# Patient Record
Sex: MALE | Race: WHITE | NOT HISPANIC OR LATINO | Employment: FULL TIME | URBAN - METROPOLITAN AREA
[De-identification: names, ages, dates, MRNs, and addresses within clinical notes are randomized per-mention and may not be internally consistent; named-entity substitution may affect disease eponyms.]

---

## 2017-10-04 ENCOUNTER — HOSPITAL ENCOUNTER (EMERGENCY)
Facility: HOSPITAL | Age: 42
Discharge: HOME | End: 2017-10-04
Attending: EMERGENCY MEDICINE

## 2017-10-04 VITALS
OXYGEN SATURATION: 100 % | SYSTOLIC BLOOD PRESSURE: 150 MMHG | BODY MASS INDEX: 34.07 KG/M2 | DIASTOLIC BLOOD PRESSURE: 95 MMHG | WEIGHT: 230 LBS | HEIGHT: 69 IN | RESPIRATION RATE: 18 BRPM | HEART RATE: 59 BPM | TEMPERATURE: 98 F

## 2017-10-04 DIAGNOSIS — R07.9 CHEST PAIN, UNSPECIFIED TYPE: Primary | ICD-10-CM

## 2017-10-04 DIAGNOSIS — R07.9 CHEST PAIN: ICD-10-CM

## 2017-10-04 LAB
ALBUMIN SERPL BCP-MCNC: 3.8 G/DL
ALP SERPL-CCNC: 102 U/L
ALT SERPL W/O P-5'-P-CCNC: 16 U/L
ANION GAP SERPL CALC-SCNC: 8 MMOL/L
AST SERPL-CCNC: 18 U/L
BASOPHILS # BLD AUTO: 0.01 K/UL
BASOPHILS NFR BLD: 0.1 %
BILIRUB SERPL-MCNC: 0.2 MG/DL
BNP SERPL-MCNC: 30 PG/ML
BUN SERPL-MCNC: 17 MG/DL
CALCIUM SERPL-MCNC: 10.2 MG/DL
CHLORIDE SERPL-SCNC: 107 MMOL/L
CO2 SERPL-SCNC: 26 MMOL/L
CREAT SERPL-MCNC: 1.1 MG/DL
DIFFERENTIAL METHOD: ABNORMAL
EOSINOPHIL # BLD AUTO: 0 K/UL
EOSINOPHIL NFR BLD: 0.2 %
ERYTHROCYTE [DISTWIDTH] IN BLOOD BY AUTOMATED COUNT: 13 %
EST. GFR  (AFRICAN AMERICAN): >60 ML/MIN/1.73 M^2
EST. GFR  (NON AFRICAN AMERICAN): >60 ML/MIN/1.73 M^2
GLUCOSE SERPL-MCNC: 96 MG/DL
HCT VFR BLD AUTO: 37.1 %
HGB BLD-MCNC: 12.4 G/DL
LYMPHOCYTES # BLD AUTO: 1.1 K/UL
LYMPHOCYTES NFR BLD: 10.6 %
MCH RBC QN AUTO: 30.5 PG
MCHC RBC AUTO-ENTMCNC: 33.4 G/DL
MCV RBC AUTO: 91 FL
MONOCYTES # BLD AUTO: 0.5 K/UL
MONOCYTES NFR BLD: 5.3 %
NEUTROPHILS # BLD AUTO: 8.4 K/UL
NEUTROPHILS NFR BLD: 83.6 %
PLATELET # BLD AUTO: 223 K/UL
PMV BLD AUTO: 9 FL
POTASSIUM SERPL-SCNC: 4.1 MMOL/L
PROT SERPL-MCNC: 7.3 G/DL
RBC # BLD AUTO: 4.06 M/UL
SODIUM SERPL-SCNC: 141 MMOL/L
TROPONIN I SERPL DL<=0.01 NG/ML-MCNC: <0.006 NG/ML
TROPONIN I SERPL DL<=0.01 NG/ML-MCNC: <0.006 NG/ML
WBC # BLD AUTO: 10.1 K/UL

## 2017-10-04 PROCEDURE — 83880 ASSAY OF NATRIURETIC PEPTIDE: CPT

## 2017-10-04 PROCEDURE — 25000003 PHARM REV CODE 250: Performed by: EMERGENCY MEDICINE

## 2017-10-04 PROCEDURE — 85025 COMPLETE CBC W/AUTO DIFF WBC: CPT

## 2017-10-04 PROCEDURE — 84484 ASSAY OF TROPONIN QUANT: CPT

## 2017-10-04 PROCEDURE — 80053 COMPREHEN METABOLIC PANEL: CPT

## 2017-10-04 PROCEDURE — 93005 ELECTROCARDIOGRAM TRACING: CPT

## 2017-10-04 PROCEDURE — 99284 EMERGENCY DEPT VISIT MOD MDM: CPT

## 2017-10-04 RX ORDER — ASPIRIN 325 MG
325 TABLET ORAL
Status: COMPLETED | OUTPATIENT
Start: 2017-10-04 | End: 2017-10-04

## 2017-10-04 RX ADMIN — ASPIRIN 325 MG ORAL TABLET 325 MG: 325 PILL ORAL at 01:10
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
## 2019-01-01 ENCOUNTER — HOSPITAL ENCOUNTER (INPATIENT)
Facility: HOSPITAL | Age: 44
LOS: 6 days | DRG: 215 | End: 2019-04-04
Attending: EMERGENCY MEDICINE | Admitting: INTERNAL MEDICINE
Payer: MEDICAID

## 2019-01-01 ENCOUNTER — ANESTHESIA EVENT (OUTPATIENT)
Dept: SURGERY | Facility: HOSPITAL | Age: 44
DRG: 215 | End: 2019-01-01
Payer: MEDICAID

## 2019-01-01 ENCOUNTER — CLINICAL SUPPORT (OUTPATIENT)
Dept: CARDIOLOGY | Facility: CLINIC | Age: 44
DRG: 215 | End: 2019-01-01
Attending: EMERGENCY MEDICINE
Payer: MEDICAID

## 2019-01-01 ENCOUNTER — ANESTHESIA (OUTPATIENT)
Dept: MEDSURG UNIT | Facility: HOSPITAL | Age: 44
DRG: 215 | End: 2019-01-01
Payer: MEDICAID

## 2019-01-01 ENCOUNTER — ANESTHESIA (OUTPATIENT)
Dept: SURGERY | Facility: HOSPITAL | Age: 44
DRG: 215 | End: 2019-01-01
Payer: MEDICAID

## 2019-01-01 ENCOUNTER — TELEPHONE (OUTPATIENT)
Dept: PHARMACY | Facility: CLINIC | Age: 44
End: 2019-01-01

## 2019-01-01 ENCOUNTER — ANESTHESIA EVENT (OUTPATIENT)
Dept: MEDSURG UNIT | Facility: HOSPITAL | Age: 44
DRG: 215 | End: 2019-01-01
Payer: MEDICAID

## 2019-01-01 VITALS
HEIGHT: 69 IN | WEIGHT: 233.69 LBS | SYSTOLIC BLOOD PRESSURE: 80 MMHG | BODY MASS INDEX: 34.61 KG/M2 | OXYGEN SATURATION: 100 % | TEMPERATURE: 99 F | DIASTOLIC BLOOD PRESSURE: 68 MMHG

## 2019-01-01 DIAGNOSIS — R07.9 CHEST PAIN: ICD-10-CM

## 2019-01-01 DIAGNOSIS — Z71.89 GOALS OF CARE, COUNSELING/DISCUSSION: ICD-10-CM

## 2019-01-01 DIAGNOSIS — I47.20 VT (VENTRICULAR TACHYCARDIA): ICD-10-CM

## 2019-01-01 DIAGNOSIS — R57.0 CARDIOGENIC SHOCK: ICD-10-CM

## 2019-01-01 DIAGNOSIS — I25.10 CAD (CORONARY ARTERY DISEASE): ICD-10-CM

## 2019-01-01 DIAGNOSIS — Z92.81 PERSONAL HISTORY OF ECMO: ICD-10-CM

## 2019-01-01 DIAGNOSIS — Z51.5 PALLIATIVE CARE ENCOUNTER: ICD-10-CM

## 2019-01-01 DIAGNOSIS — I21.3 ST ELEVATION MYOCARDIAL INFARCTION (STEMI), UNSPECIFIED ARTERY: Primary | ICD-10-CM

## 2019-01-01 DIAGNOSIS — I63.531 ACUTE ISCHEMIC RIGHT PCA STROKE: ICD-10-CM

## 2019-01-01 DIAGNOSIS — N17.9 AKI (ACUTE KIDNEY INJURY): ICD-10-CM

## 2019-01-01 DIAGNOSIS — I21.01: ICD-10-CM

## 2019-01-01 DIAGNOSIS — I99.8 LOWER LIMB ISCHEMIA: ICD-10-CM

## 2019-01-01 DIAGNOSIS — I21.01 ST ELEVATION MYOCARDIAL INFARCTION INVOLVING LEFT MAIN CORONARY ARTERY: ICD-10-CM

## 2019-01-01 DIAGNOSIS — I47.20 V-TACH: ICD-10-CM

## 2019-01-01 DIAGNOSIS — N17.0 ATN (ACUTE TUBULAR NECROSIS): ICD-10-CM

## 2019-01-01 DIAGNOSIS — I21.3 ST ELEVATION MYOCARDIAL INFARCTION (STEMI): ICD-10-CM

## 2019-01-01 DIAGNOSIS — E87.20 METABOLIC ACIDOSIS: ICD-10-CM

## 2019-01-01 DIAGNOSIS — Z99.11 ON MECHANICALLY ASSISTED VENTILATION: ICD-10-CM

## 2019-01-01 LAB
ABO + RH BLD: NORMAL
ABO GROUP BLD: NORMAL
ALBUMIN SERPL BCP-MCNC: 2.1 G/DL (ref 3.5–5.2)
ALBUMIN SERPL BCP-MCNC: 2.2 G/DL (ref 3.5–5.2)
ALBUMIN SERPL BCP-MCNC: 2.4 G/DL (ref 3.5–5.2)
ALBUMIN SERPL BCP-MCNC: 2.5 G/DL (ref 3.5–5.2)
ALBUMIN SERPL BCP-MCNC: 2.7 G/DL (ref 3.5–5.2)
ALBUMIN SERPL BCP-MCNC: 2.7 G/DL (ref 3.5–5.2)
ALBUMIN SERPL BCP-MCNC: 2.9 G/DL (ref 3.5–5.2)
ALBUMIN SERPL BCP-MCNC: 3 G/DL (ref 3.5–5.2)
ALBUMIN SERPL BCP-MCNC: 3.1 G/DL (ref 3.5–5.2)
ALBUMIN SERPL BCP-MCNC: 3.2 G/DL (ref 3.5–5.2)
ALBUMIN SERPL BCP-MCNC: 3.3 G/DL (ref 3.5–5.2)
ALBUMIN SERPL BCP-MCNC: 3.4 G/DL (ref 3.5–5.2)
ALBUMIN SERPL BCP-MCNC: 3.4 G/DL (ref 3.5–5.2)
ALBUMIN SERPL BCP-MCNC: 3.6 G/DL (ref 3.5–5.2)
ALBUMIN SERPL BCP-MCNC: 3.6 G/DL (ref 3.5–5.2)
ALBUMIN SERPL BCP-MCNC: 3.7 G/DL (ref 3.5–5.2)
ALLENS TEST: ABNORMAL
ALP SERPL-CCNC: 100 U/L (ref 55–135)
ALP SERPL-CCNC: 101 U/L (ref 55–135)
ALP SERPL-CCNC: 111 U/L (ref 55–135)
ALP SERPL-CCNC: 113 U/L (ref 55–135)
ALP SERPL-CCNC: 117 U/L (ref 55–135)
ALP SERPL-CCNC: 119 U/L (ref 55–135)
ALP SERPL-CCNC: 119 U/L (ref 55–135)
ALP SERPL-CCNC: 128 U/L (ref 55–135)
ALP SERPL-CCNC: 136 U/L (ref 55–135)
ALP SERPL-CCNC: 138 U/L (ref 55–135)
ALP SERPL-CCNC: 142 U/L (ref 55–135)
ALP SERPL-CCNC: 142 U/L (ref 55–135)
ALP SERPL-CCNC: 143 U/L (ref 55–135)
ALP SERPL-CCNC: 143 U/L (ref 55–135)
ALP SERPL-CCNC: 144 U/L (ref 55–135)
ALP SERPL-CCNC: 151 U/L (ref 55–135)
ALP SERPL-CCNC: 152 U/L (ref 55–135)
ALP SERPL-CCNC: 159 U/L (ref 55–135)
ALP SERPL-CCNC: 162 U/L (ref 55–135)
ALP SERPL-CCNC: 164 U/L (ref 55–135)
ALP SERPL-CCNC: 165 U/L (ref 55–135)
ALP SERPL-CCNC: 167 U/L (ref 55–135)
ALP SERPL-CCNC: 170 U/L (ref 55–135)
ALP SERPL-CCNC: 174 U/L (ref 55–135)
ALP SERPL-CCNC: 175 U/L (ref 55–135)
ALP SERPL-CCNC: 179 U/L (ref 55–135)
ALP SERPL-CCNC: 179 U/L (ref 55–135)
ALP SERPL-CCNC: 180 U/L (ref 55–135)
ALP SERPL-CCNC: 183 U/L (ref 55–135)
ALP SERPL-CCNC: 185 U/L (ref 55–135)
ALP SERPL-CCNC: 185 U/L (ref 55–135)
ALP SERPL-CCNC: 188 U/L (ref 55–135)
ALP SERPL-CCNC: 190 U/L (ref 55–135)
ALP SERPL-CCNC: 194 U/L (ref 55–135)
ALP SERPL-CCNC: 97 U/L (ref 55–135)
ALT SERPL W/O P-5'-P-CCNC: 1006 U/L (ref 10–44)
ALT SERPL W/O P-5'-P-CCNC: 1010 U/L (ref 10–44)
ALT SERPL W/O P-5'-P-CCNC: 1092 U/L (ref 10–44)
ALT SERPL W/O P-5'-P-CCNC: 1143 U/L (ref 10–44)
ALT SERPL W/O P-5'-P-CCNC: 1372 U/L (ref 10–44)
ALT SERPL W/O P-5'-P-CCNC: 1492 U/L (ref 10–44)
ALT SERPL W/O P-5'-P-CCNC: 1575 U/L (ref 10–44)
ALT SERPL W/O P-5'-P-CCNC: 1644 U/L (ref 10–44)
ALT SERPL W/O P-5'-P-CCNC: 1770 U/L (ref 10–44)
ALT SERPL W/O P-5'-P-CCNC: 1934 U/L (ref 10–44)
ALT SERPL W/O P-5'-P-CCNC: 2114 U/L (ref 10–44)
ALT SERPL W/O P-5'-P-CCNC: 2328 U/L (ref 10–44)
ALT SERPL W/O P-5'-P-CCNC: 2490 U/L (ref 10–44)
ALT SERPL W/O P-5'-P-CCNC: 3075 U/L (ref 10–44)
ALT SERPL W/O P-5'-P-CCNC: 3374 U/L (ref 10–44)
ALT SERPL W/O P-5'-P-CCNC: 3895 U/L (ref 10–44)
ALT SERPL W/O P-5'-P-CCNC: 4390 U/L (ref 10–44)
ALT SERPL W/O P-5'-P-CCNC: 479 U/L (ref 10–44)
ALT SERPL W/O P-5'-P-CCNC: 5115 U/L (ref 10–44)
ALT SERPL W/O P-5'-P-CCNC: 524 U/L (ref 10–44)
ALT SERPL W/O P-5'-P-CCNC: 5475 U/L (ref 10–44)
ALT SERPL W/O P-5'-P-CCNC: 6121 U/L (ref 10–44)
ALT SERPL W/O P-5'-P-CCNC: 633 U/L (ref 10–44)
ALT SERPL W/O P-5'-P-CCNC: 6341 U/L (ref 10–44)
ALT SERPL W/O P-5'-P-CCNC: 709 U/L (ref 10–44)
ALT SERPL W/O P-5'-P-CCNC: 7356 U/L (ref 10–44)
ALT SERPL W/O P-5'-P-CCNC: 739 U/L (ref 10–44)
ALT SERPL W/O P-5'-P-CCNC: 7771 U/L (ref 10–44)
ALT SERPL W/O P-5'-P-CCNC: 7850 U/L (ref 10–44)
ALT SERPL W/O P-5'-P-CCNC: 808 U/L (ref 10–44)
ALT SERPL W/O P-5'-P-CCNC: 855 U/L (ref 10–44)
ALT SERPL W/O P-5'-P-CCNC: 8594 U/L (ref 10–44)
ALT SERPL W/O P-5'-P-CCNC: 881 U/L (ref 10–44)
ALT SERPL W/O P-5'-P-CCNC: 916 U/L (ref 10–44)
ALT SERPL W/O P-5'-P-CCNC: 98 U/L (ref 10–44)
ALT SERPL W/O P-5'-P-CCNC: 9973 U/L (ref 10–44)
ALT SERPL W/O P-5'-P-CCNC: ABNORMAL U/L (ref 10–44)
AMMONIA PLAS-SCNC: 116 UMOL/L (ref 10–50)
AMPHET+METHAMPHET UR QL: NEGATIVE
ANION GAP SERPL CALC-SCNC: 10 MMOL/L (ref 8–16)
ANION GAP SERPL CALC-SCNC: 11 MMOL/L (ref 8–16)
ANION GAP SERPL CALC-SCNC: 11 MMOL/L (ref 8–16)
ANION GAP SERPL CALC-SCNC: 12 MMOL/L (ref 8–16)
ANION GAP SERPL CALC-SCNC: 13 MMOL/L (ref 8–16)
ANION GAP SERPL CALC-SCNC: 13 MMOL/L (ref 8–16)
ANION GAP SERPL CALC-SCNC: 14 MMOL/L (ref 8–16)
ANION GAP SERPL CALC-SCNC: 15 MMOL/L (ref 8–16)
ANION GAP SERPL CALC-SCNC: 15 MMOL/L (ref 8–16)
ANION GAP SERPL CALC-SCNC: 16 MMOL/L (ref 8–16)
ANION GAP SERPL CALC-SCNC: 17 MMOL/L (ref 8–16)
ANION GAP SERPL CALC-SCNC: 18 MMOL/L (ref 8–16)
ANION GAP SERPL CALC-SCNC: 21 MMOL/L (ref 8–16)
ANION GAP SERPL CALC-SCNC: 21 MMOL/L (ref 8–16)
ANION GAP SERPL CALC-SCNC: 22 MMOL/L (ref 8–16)
ANION GAP SERPL CALC-SCNC: 24 MMOL/L (ref 8–16)
ANION GAP SERPL CALC-SCNC: 27 MMOL/L (ref 8–16)
ANION GAP SERPL CALC-SCNC: 6 MMOL/L (ref 8–16)
ANION GAP SERPL CALC-SCNC: 7 MMOL/L (ref 8–16)
ANION GAP SERPL CALC-SCNC: 8 MMOL/L (ref 8–16)
ANION GAP SERPL CALC-SCNC: 9 MMOL/L (ref 8–16)
ANISOCYTOSIS BLD QL SMEAR: ABNORMAL
ANISOCYTOSIS BLD QL SMEAR: SLIGHT
APTT BLDCRRT: 37.8 SEC (ref 21–32)
APTT BLDCRRT: 38 SEC (ref 21–32)
APTT BLDCRRT: 38.6 SEC (ref 21–32)
APTT BLDCRRT: 40.4 SEC (ref 21–32)
APTT BLDCRRT: 40.4 SEC (ref 21–32)
APTT BLDCRRT: 43.2 SEC (ref 21–32)
APTT BLDCRRT: 44.5 SEC (ref 21–32)
APTT BLDCRRT: 45.3 SEC (ref 21–32)
APTT BLDCRRT: 45.4 SEC (ref 21–32)
APTT BLDCRRT: 46.4 SEC (ref 21–32)
APTT BLDCRRT: 46.8 SEC (ref 21–32)
APTT BLDCRRT: 47.1 SEC (ref 21–32)
APTT BLDCRRT: 47.2 SEC (ref 21–32)
APTT BLDCRRT: 47.8 SEC (ref 21–32)
APTT BLDCRRT: 47.8 SEC (ref 21–32)
APTT BLDCRRT: 47.9 SEC (ref 21–32)
APTT BLDCRRT: 47.9 SEC (ref 21–32)
APTT BLDCRRT: 48.1 SEC (ref 21–32)
APTT BLDCRRT: 48.7 SEC (ref 21–32)
APTT BLDCRRT: 48.7 SEC (ref 21–32)
APTT BLDCRRT: 49.2 SEC (ref 21–32)
APTT BLDCRRT: 49.2 SEC (ref 21–32)
APTT BLDCRRT: 49.5 SEC (ref 21–32)
APTT BLDCRRT: 51.1 SEC (ref 21–32)
APTT BLDCRRT: 52.5 SEC (ref 21–32)
APTT BLDCRRT: 55.8 SEC (ref 21–32)
APTT BLDCRRT: 59.3 SEC (ref 21–32)
APTT BLDCRRT: 59.7 SEC (ref 21–32)
APTT BLDCRRT: 63.7 SEC (ref 21–32)
APTT BLDCRRT: 76 SEC (ref 21–32)
APTT BLDCRRT: 76.7 SEC (ref 21–32)
APTT BLDCRRT: 78.2 SEC (ref 21–32)
APTT BLDCRRT: 78.9 SEC (ref 21–32)
APTT BLDCRRT: 78.9 SEC (ref 21–32)
APTT BLDCRRT: 80.8 SEC (ref 21–32)
APTT BLDCRRT: 84.7 SEC (ref 21–32)
APTT BLDCRRT: 92.8 SEC (ref 21–32)
AST SERPL-CCNC: 1025 U/L (ref 10–40)
AST SERPL-CCNC: 1124 U/L (ref 10–40)
AST SERPL-CCNC: 1152 U/L (ref 10–40)
AST SERPL-CCNC: 1227 U/L (ref 10–40)
AST SERPL-CCNC: 1428 U/L (ref 10–40)
AST SERPL-CCNC: 1541 U/L (ref 10–40)
AST SERPL-CCNC: 1745 U/L (ref 10–40)
AST SERPL-CCNC: 1929 U/L (ref 10–40)
AST SERPL-CCNC: 2375 U/L (ref 10–40)
AST SERPL-CCNC: 2795 U/L (ref 10–40)
AST SERPL-CCNC: 3230 U/L (ref 10–40)
AST SERPL-CCNC: 3574 U/L (ref 10–40)
AST SERPL-CCNC: 4237 U/L (ref 10–40)
AST SERPL-CCNC: 488 U/L (ref 10–40)
AST SERPL-CCNC: 5100 U/L (ref 10–40)
AST SERPL-CCNC: 558 U/L (ref 10–40)
AST SERPL-CCNC: 5947 U/L (ref 10–40)
AST SERPL-CCNC: 694 U/L (ref 10–40)
AST SERPL-CCNC: 7086 U/L (ref 10–40)
AST SERPL-CCNC: 8019 U/L (ref 10–40)
AST SERPL-CCNC: 802 U/L (ref 10–40)
AST SERPL-CCNC: 8746 U/L (ref 10–40)
AST SERPL-CCNC: 90 U/L (ref 10–40)
AST SERPL-CCNC: 900 U/L (ref 10–40)
AST SERPL-CCNC: 9973 U/L (ref 10–40)
AST SERPL-CCNC: ABNORMAL U/L (ref 10–40)
BARBITURATES UR QL SCN>200 NG/ML: NEGATIVE
BASO STIPL BLD QL SMEAR: ABNORMAL
BASOPHILS # BLD AUTO: 0.01 K/UL (ref 0–0.2)
BASOPHILS # BLD AUTO: 0.02 K/UL (ref 0–0.2)
BASOPHILS # BLD AUTO: 0.03 K/UL (ref 0–0.2)
BASOPHILS # BLD AUTO: 0.04 K/UL (ref 0–0.2)
BASOPHILS # BLD AUTO: 0.04 K/UL (ref 0–0.2)
BASOPHILS # BLD AUTO: 0.05 K/UL (ref 0–0.2)
BASOPHILS # BLD AUTO: 0.05 K/UL (ref 0–0.2)
BASOPHILS # BLD AUTO: 0.07 K/UL (ref 0–0.2)
BASOPHILS # BLD AUTO: 0.08 K/UL (ref 0–0.2)
BASOPHILS # BLD AUTO: 0.08 K/UL (ref 0–0.2)
BASOPHILS # BLD AUTO: 0.1 K/UL (ref 0–0.2)
BASOPHILS # BLD AUTO: 0.1 K/UL (ref 0–0.2)
BASOPHILS # BLD AUTO: ABNORMAL K/UL (ref 0–0.2)
BASOPHILS NFR BLD: 0 % (ref 0–1.9)
BASOPHILS NFR BLD: 0.1 % (ref 0–1.9)
BASOPHILS NFR BLD: 0.2 % (ref 0–1.9)
BASOPHILS NFR BLD: 0.3 % (ref 0–1.9)
BASOPHILS NFR BLD: 0.4 % (ref 0–1.9)
BASOPHILS NFR BLD: 0.5 % (ref 0–1.9)
BASOPHILS NFR BLD: 0.5 % (ref 0–1.9)
BENZODIAZ UR QL SCN>200 NG/ML: NEGATIVE
BILIRUB SERPL-MCNC: 0.2 MG/DL (ref 0.1–1)
BILIRUB SERPL-MCNC: 0.4 MG/DL (ref 0.1–1)
BILIRUB SERPL-MCNC: 0.7 MG/DL (ref 0.1–1)
BILIRUB SERPL-MCNC: 0.8 MG/DL (ref 0.1–1)
BILIRUB SERPL-MCNC: 1.5 MG/DL (ref 0.1–1)
BILIRUB SERPL-MCNC: 1.9 MG/DL (ref 0.1–1)
BILIRUB SERPL-MCNC: 1.9 MG/DL (ref 0.1–1)
BILIRUB SERPL-MCNC: 10.5 MG/DL (ref 0.1–1)
BILIRUB SERPL-MCNC: 11.2 MG/DL (ref 0.1–1)
BILIRUB SERPL-MCNC: 11.6 MG/DL (ref 0.1–1)
BILIRUB SERPL-MCNC: 12.3 MG/DL (ref 0.1–1)
BILIRUB SERPL-MCNC: 13 MG/DL (ref 0.1–1)
BILIRUB SERPL-MCNC: 13.7 MG/DL (ref 0.1–1)
BILIRUB SERPL-MCNC: 13.8 MG/DL (ref 0.1–1)
BILIRUB SERPL-MCNC: 14 MG/DL (ref 0.1–1)
BILIRUB SERPL-MCNC: 15.4 MG/DL (ref 0.1–1)
BILIRUB SERPL-MCNC: 15.6 MG/DL (ref 0.1–1)
BILIRUB SERPL-MCNC: 16.4 MG/DL (ref 0.1–1)
BILIRUB SERPL-MCNC: 16.6 MG/DL (ref 0.1–1)
BILIRUB SERPL-MCNC: 18.9 MG/DL (ref 0.1–1)
BILIRUB SERPL-MCNC: 2.3 MG/DL (ref 0.1–1)
BILIRUB SERPL-MCNC: 3.1 MG/DL (ref 0.1–1)
BILIRUB SERPL-MCNC: 3.7 MG/DL (ref 0.1–1)
BILIRUB SERPL-MCNC: 4 MG/DL (ref 0.1–1)
BILIRUB SERPL-MCNC: 4.4 MG/DL (ref 0.1–1)
BILIRUB SERPL-MCNC: 4.7 MG/DL (ref 0.1–1)
BILIRUB SERPL-MCNC: 5.5 MG/DL (ref 0.1–1)
BILIRUB SERPL-MCNC: 5.9 MG/DL (ref 0.1–1)
BILIRUB SERPL-MCNC: 6.3 MG/DL (ref 0.1–1)
BILIRUB SERPL-MCNC: 6.5 MG/DL (ref 0.1–1)
BILIRUB SERPL-MCNC: 6.9 MG/DL (ref 0.1–1)
BILIRUB SERPL-MCNC: 7.3 MG/DL (ref 0.1–1)
BILIRUB SERPL-MCNC: 7.3 MG/DL (ref 0.1–1)
BILIRUB SERPL-MCNC: 7.9 MG/DL (ref 0.1–1)
BILIRUB SERPL-MCNC: 8.9 MG/DL (ref 0.1–1)
BILIRUB SERPL-MCNC: 9.3 MG/DL (ref 0.1–1)
BILIRUB SERPL-MCNC: 9.8 MG/DL (ref 0.1–1)
BLD GP AB SCN CELLS X3 SERPL QL: NORMAL
BLD GP AB SCN CELLS X3 SERPL QL: NORMAL
BLD PROD TYP BPU: NORMAL
BLOOD UNIT EXPIRATION DATE: NORMAL
BLOOD UNIT TYPE CODE: 5100
BLOOD UNIT TYPE CODE: 600
BLOOD UNIT TYPE CODE: 600
BLOOD UNIT TYPE CODE: 6200
BLOOD UNIT TYPE CODE: 9500
BLOOD UNIT TYPE: NORMAL
BNP SERPL-MCNC: 192 PG/ML (ref 0–99)
BSA FOR ECHO PROCEDURE: 2.25 M2
BUN SERPL-MCNC: 10 MG/DL (ref 6–20)
BUN SERPL-MCNC: 11 MG/DL (ref 6–20)
BUN SERPL-MCNC: 12 MG/DL (ref 6–20)
BUN SERPL-MCNC: 13 MG/DL (ref 6–20)
BUN SERPL-MCNC: 14 MG/DL (ref 6–20)
BUN SERPL-MCNC: 14 MG/DL (ref 6–20)
BUN SERPL-MCNC: 15 MG/DL (ref 6–20)
BUN SERPL-MCNC: 15 MG/DL (ref 6–20)
BUN SERPL-MCNC: 20 MG/DL (ref 6–20)
BUN SERPL-MCNC: 22 MG/DL (ref 6–20)
BUN SERPL-MCNC: 25 MG/DL (ref 6–20)
BUN SERPL-MCNC: 31 MG/DL (ref 6–20)
BUN SERPL-MCNC: 8 MG/DL (ref 6–20)
BUN SERPL-MCNC: 9 MG/DL (ref 6–20)
BURR CELLS BLD QL SMEAR: ABNORMAL
BZE UR QL SCN: NEGATIVE
CA-I BLDV-SCNC: 1.09 MMOL/L (ref 1.06–1.42)
CA-I BLDV-SCNC: 1.11 MMOL/L (ref 1.06–1.42)
CA-I BLDV-SCNC: 1.12 MMOL/L (ref 1.06–1.42)
CA-I BLDV-SCNC: 1.16 MMOL/L (ref 1.06–1.42)
CA-I BLDV-SCNC: 1.21 MMOL/L (ref 1.06–1.42)
CA-I BLDV-SCNC: 1.24 MMOL/L (ref 1.06–1.42)
CA-I BLDV-SCNC: 1.25 MMOL/L (ref 1.06–1.42)
CA-I BLDV-SCNC: 1.27 MMOL/L (ref 1.06–1.42)
CA-I BLDV-SCNC: 1.27 MMOL/L (ref 1.06–1.42)
CA-I BLDV-SCNC: 1.28 MMOL/L (ref 1.06–1.42)
CA-I BLDV-SCNC: 1.29 MMOL/L (ref 1.06–1.42)
CA-I BLDV-SCNC: 1.3 MMOL/L (ref 1.06–1.42)
CA-I BLDV-SCNC: 1.32 MMOL/L (ref 1.06–1.42)
CA-I BLDV-SCNC: 1.33 MMOL/L (ref 1.06–1.42)
CA-I BLDV-SCNC: 1.35 MMOL/L (ref 1.06–1.42)
CA-I BLDV-SCNC: 1.35 MMOL/L (ref 1.06–1.42)
CA-I BLDV-SCNC: 1.36 MMOL/L (ref 1.06–1.42)
CA-I BLDV-SCNC: 1.41 MMOL/L (ref 1.06–1.42)
CA-I BLDV-SCNC: 1.46 MMOL/L (ref 1.06–1.42)
CA-I BLDV-SCNC: 1.46 MMOL/L (ref 1.06–1.42)
CA-I BLDV-SCNC: 1.9 MMOL/L (ref 1.06–1.42)
CALCIUM SERPL-MCNC: 10 MG/DL (ref 8.7–10.5)
CALCIUM SERPL-MCNC: 10.1 MG/DL (ref 8.7–10.5)
CALCIUM SERPL-MCNC: 10.2 MG/DL (ref 8.7–10.5)
CALCIUM SERPL-MCNC: 10.3 MG/DL (ref 8.7–10.5)
CALCIUM SERPL-MCNC: 10.4 MG/DL (ref 8.7–10.5)
CALCIUM SERPL-MCNC: 10.5 MG/DL (ref 8.7–10.5)
CALCIUM SERPL-MCNC: 10.6 MG/DL (ref 8.7–10.5)
CALCIUM SERPL-MCNC: 10.7 MG/DL (ref 8.7–10.5)
CALCIUM SERPL-MCNC: 10.9 MG/DL (ref 8.7–10.5)
CALCIUM SERPL-MCNC: 11 MG/DL (ref 8.7–10.5)
CALCIUM SERPL-MCNC: 11 MG/DL (ref 8.7–10.5)
CALCIUM SERPL-MCNC: 11.4 MG/DL (ref 8.7–10.5)
CALCIUM SERPL-MCNC: 11.7 MG/DL (ref 8.7–10.5)
CALCIUM SERPL-MCNC: 11.7 MG/DL (ref 8.7–10.5)
CALCIUM SERPL-MCNC: 13.6 MG/DL (ref 8.7–10.5)
CALCIUM SERPL-MCNC: 13.7 MG/DL (ref 8.7–10.5)
CALCIUM SERPL-MCNC: 7.4 MG/DL (ref 8.7–10.5)
CALCIUM SERPL-MCNC: 7.5 MG/DL (ref 8.7–10.5)
CALCIUM SERPL-MCNC: 8.2 MG/DL (ref 8.7–10.5)
CALCIUM SERPL-MCNC: 8.3 MG/DL (ref 8.7–10.5)
CALCIUM SERPL-MCNC: 8.4 MG/DL (ref 8.7–10.5)
CALCIUM SERPL-MCNC: 9.1 MG/DL (ref 8.7–10.5)
CALCIUM SERPL-MCNC: 9.2 MG/DL (ref 8.7–10.5)
CALCIUM SERPL-MCNC: 9.2 MG/DL (ref 8.7–10.5)
CALCIUM SERPL-MCNC: 9.3 MG/DL (ref 8.7–10.5)
CALCIUM SERPL-MCNC: 9.5 MG/DL (ref 8.7–10.5)
CALCIUM SERPL-MCNC: 9.5 MG/DL (ref 8.7–10.5)
CALCIUM SERPL-MCNC: 9.6 MG/DL (ref 8.7–10.5)
CALCIUM SERPL-MCNC: 9.6 MG/DL (ref 8.7–10.5)
CALCIUM SERPL-MCNC: 9.8 MG/DL (ref 8.7–10.5)
CANNABINOIDS UR QL SCN: NORMAL
CHLORIDE SERPL-SCNC: 100 MMOL/L (ref 95–110)
CHLORIDE SERPL-SCNC: 101 MMOL/L (ref 95–110)
CHLORIDE SERPL-SCNC: 102 MMOL/L (ref 95–110)
CHLORIDE SERPL-SCNC: 103 MMOL/L (ref 95–110)
CHLORIDE SERPL-SCNC: 104 MMOL/L (ref 95–110)
CHLORIDE SERPL-SCNC: 105 MMOL/L (ref 95–110)
CHLORIDE SERPL-SCNC: 106 MMOL/L (ref 95–110)
CHLORIDE SERPL-SCNC: 107 MMOL/L (ref 95–110)
CHLORIDE SERPL-SCNC: 114 MMOL/L (ref 95–110)
CHLORIDE SERPL-SCNC: 98 MMOL/L (ref 95–110)
CHOLEST SERPL-MCNC: 194 MG/DL (ref 120–199)
CHOLEST/HDLC SERPL: 7.8 {RATIO} (ref 2–5)
CK SERPL-CCNC: 5812 U/L (ref 20–200)
CK SERPL-CCNC: 6394 U/L (ref 20–200)
CK SERPL-CCNC: 7826 U/L (ref 20–200)
CK SERPL-CCNC: 9678 U/L (ref 20–200)
CK SERPL-CCNC: ABNORMAL U/L (ref 20–200)
CO2 SERPL-SCNC: 12 MMOL/L (ref 23–29)
CO2 SERPL-SCNC: 14 MMOL/L (ref 23–29)
CO2 SERPL-SCNC: 16 MMOL/L (ref 23–29)
CO2 SERPL-SCNC: 16 MMOL/L (ref 23–29)
CO2 SERPL-SCNC: 17 MMOL/L (ref 23–29)
CO2 SERPL-SCNC: 18 MMOL/L (ref 23–29)
CO2 SERPL-SCNC: 18 MMOL/L (ref 23–29)
CO2 SERPL-SCNC: 19 MMOL/L (ref 23–29)
CO2 SERPL-SCNC: 20 MMOL/L (ref 23–29)
CO2 SERPL-SCNC: 21 MMOL/L (ref 23–29)
CO2 SERPL-SCNC: 22 MMOL/L (ref 23–29)
CO2 SERPL-SCNC: 23 MMOL/L (ref 23–29)
CO2 SERPL-SCNC: 24 MMOL/L (ref 23–29)
CO2 SERPL-SCNC: 25 MMOL/L (ref 23–29)
CO2 SERPL-SCNC: 26 MMOL/L (ref 23–29)
CO2 SERPL-SCNC: 9 MMOL/L (ref 23–29)
CODING SYSTEM: NORMAL
CREAT SERPL-MCNC: 0.7 MG/DL (ref 0.5–1.4)
CREAT SERPL-MCNC: 1 MG/DL (ref 0.5–1.4)
CREAT SERPL-MCNC: 1.1 MG/DL (ref 0.5–1.4)
CREAT SERPL-MCNC: 1.2 MG/DL (ref 0.5–1.4)
CREAT SERPL-MCNC: 1.3 MG/DL (ref 0.5–1.4)
CREAT SERPL-MCNC: 1.4 MG/DL (ref 0.5–1.4)
CREAT SERPL-MCNC: 1.5 MG/DL (ref 0.5–1.4)
CREAT SERPL-MCNC: 1.5 MG/DL (ref 0.5–1.4)
CREAT SERPL-MCNC: 1.6 MG/DL (ref 0.5–1.4)
CREAT SERPL-MCNC: 1.7 MG/DL (ref 0.5–1.4)
CREAT SERPL-MCNC: 1.7 MG/DL (ref 0.5–1.4)
CREAT SERPL-MCNC: 1.8 MG/DL (ref 0.5–1.4)
CREAT SERPL-MCNC: 2 MG/DL (ref 0.5–1.4)
CREAT SERPL-MCNC: 2 MG/DL (ref 0.5–1.4)
CREAT SERPL-MCNC: 2.2 MG/DL (ref 0.5–1.4)
CREAT SERPL-MCNC: 2.6 MG/DL (ref 0.5–1.4)
CREAT SERPL-MCNC: 2.6 MG/DL (ref 0.5–1.4)
CREAT SERPL-MCNC: 2.9 MG/DL (ref 0.5–1.4)
CREAT SERPL-MCNC: 2.9 MG/DL (ref 0.5–1.4)
CREAT SERPL-MCNC: 3.5 MG/DL (ref 0.5–1.4)
CREAT SERPL-MCNC: 5 MG/DL (ref 0.5–1.4)
CREAT UR-MCNC: 127 MG/DL (ref 23–375)
CV ECHO LV RWT: 0.39 CM
DAT IGG-SP REAG RBC-IMP: NORMAL
DELSYS: ABNORMAL
DIFFERENTIAL METHOD: ABNORMAL
DISPENSE STATUS: NORMAL
DOHLE BOD BLD QL SMEAR: PRESENT
DOP CALC LVOT AREA: 3.23 CM2
DOP CALC LVOT DIAMETER: 2.03 CM
E WAVE DECELERATION TIME: 81.67 MSEC
E/A RATIO: 1.19
ECHO LV POSTERIOR WALL: 1.02 CM (ref 0.6–1.1)
EOSINOPHIL # BLD AUTO: 0 K/UL (ref 0–0.5)
EOSINOPHIL # BLD AUTO: 0.1 K/UL (ref 0–0.5)
EOSINOPHIL # BLD AUTO: ABNORMAL K/UL (ref 0–0.5)
EOSINOPHIL NFR BLD: 0 % (ref 0–8)
EOSINOPHIL NFR BLD: 0.1 % (ref 0–8)
EOSINOPHIL NFR BLD: 0.2 % (ref 0–8)
EOSINOPHIL NFR BLD: 0.2 % (ref 0–8)
EOSINOPHIL NFR BLD: 0.6 % (ref 0–8)
EOSINOPHIL NFR BLD: 1 % (ref 0–8)
ERYTHROCYTE [DISTWIDTH] IN BLOOD BY AUTOMATED COUNT: 14 % (ref 11.5–14.5)
ERYTHROCYTE [DISTWIDTH] IN BLOOD BY AUTOMATED COUNT: 14.3 % (ref 11.5–14.5)
ERYTHROCYTE [DISTWIDTH] IN BLOOD BY AUTOMATED COUNT: 14.5 % (ref 11.5–14.5)
ERYTHROCYTE [DISTWIDTH] IN BLOOD BY AUTOMATED COUNT: 14.5 % (ref 11.5–14.5)
ERYTHROCYTE [DISTWIDTH] IN BLOOD BY AUTOMATED COUNT: 14.6 % (ref 11.5–14.5)
ERYTHROCYTE [DISTWIDTH] IN BLOOD BY AUTOMATED COUNT: 14.6 % (ref 11.5–14.5)
ERYTHROCYTE [DISTWIDTH] IN BLOOD BY AUTOMATED COUNT: 14.7 % (ref 11.5–14.5)
ERYTHROCYTE [DISTWIDTH] IN BLOOD BY AUTOMATED COUNT: 14.7 % (ref 11.5–14.5)
ERYTHROCYTE [DISTWIDTH] IN BLOOD BY AUTOMATED COUNT: 14.8 % (ref 11.5–14.5)
ERYTHROCYTE [DISTWIDTH] IN BLOOD BY AUTOMATED COUNT: 14.8 % (ref 11.5–14.5)
ERYTHROCYTE [DISTWIDTH] IN BLOOD BY AUTOMATED COUNT: 15 % (ref 11.5–14.5)
ERYTHROCYTE [DISTWIDTH] IN BLOOD BY AUTOMATED COUNT: 15.1 % (ref 11.5–14.5)
ERYTHROCYTE [DISTWIDTH] IN BLOOD BY AUTOMATED COUNT: 15.1 % (ref 11.5–14.5)
ERYTHROCYTE [DISTWIDTH] IN BLOOD BY AUTOMATED COUNT: 15.4 % (ref 11.5–14.5)
ERYTHROCYTE [DISTWIDTH] IN BLOOD BY AUTOMATED COUNT: 15.5 % (ref 11.5–14.5)
ERYTHROCYTE [DISTWIDTH] IN BLOOD BY AUTOMATED COUNT: 15.7 % (ref 11.5–14.5)
ERYTHROCYTE [DISTWIDTH] IN BLOOD BY AUTOMATED COUNT: 15.7 % (ref 11.5–14.5)
ERYTHROCYTE [DISTWIDTH] IN BLOOD BY AUTOMATED COUNT: 15.8 % (ref 11.5–14.5)
ERYTHROCYTE [DISTWIDTH] IN BLOOD BY AUTOMATED COUNT: 15.8 % (ref 11.5–14.5)
ERYTHROCYTE [DISTWIDTH] IN BLOOD BY AUTOMATED COUNT: 15.9 % (ref 11.5–14.5)
ERYTHROCYTE [DISTWIDTH] IN BLOOD BY AUTOMATED COUNT: 16.1 % (ref 11.5–14.5)
ERYTHROCYTE [DISTWIDTH] IN BLOOD BY AUTOMATED COUNT: 16.2 % (ref 11.5–14.5)
ERYTHROCYTE [DISTWIDTH] IN BLOOD BY AUTOMATED COUNT: 16.3 % (ref 11.5–14.5)
ERYTHROCYTE [DISTWIDTH] IN BLOOD BY AUTOMATED COUNT: 16.5 % (ref 11.5–14.5)
ERYTHROCYTE [DISTWIDTH] IN BLOOD BY AUTOMATED COUNT: 16.7 % (ref 11.5–14.5)
ERYTHROCYTE [DISTWIDTH] IN BLOOD BY AUTOMATED COUNT: 17.1 % (ref 11.5–14.5)
ERYTHROCYTE [DISTWIDTH] IN BLOOD BY AUTOMATED COUNT: 17.2 % (ref 11.5–14.5)
ERYTHROCYTE [DISTWIDTH] IN BLOOD BY AUTOMATED COUNT: 17.5 % (ref 11.5–14.5)
ERYTHROCYTE [DISTWIDTH] IN BLOOD BY AUTOMATED COUNT: 17.6 % (ref 11.5–14.5)
ERYTHROCYTE [SEDIMENTATION RATE] IN BLOOD BY WESTERGREN METHOD: 10 MM/H
ERYTHROCYTE [SEDIMENTATION RATE] IN BLOOD BY WESTERGREN METHOD: 10 MM/H
ERYTHROCYTE [SEDIMENTATION RATE] IN BLOOD BY WESTERGREN METHOD: 18 MM/H
ERYTHROCYTE [SEDIMENTATION RATE] IN BLOOD BY WESTERGREN METHOD: 20 MM/H
ERYTHROCYTE [SEDIMENTATION RATE] IN BLOOD BY WESTERGREN METHOD: 20 MM/H
ERYTHROCYTE [SEDIMENTATION RATE] IN BLOOD BY WESTERGREN METHOD: 26 MM/H
EST. GFR  (AFRICAN AMERICAN): 15.2 ML/MIN/1.73 M^2
EST. GFR  (AFRICAN AMERICAN): 23.3 ML/MIN/1.73 M^2
EST. GFR  (AFRICAN AMERICAN): 29.3 ML/MIN/1.73 M^2
EST. GFR  (AFRICAN AMERICAN): 29.3 ML/MIN/1.73 M^2
EST. GFR  (AFRICAN AMERICAN): 33.4 ML/MIN/1.73 M^2
EST. GFR  (AFRICAN AMERICAN): 33.4 ML/MIN/1.73 M^2
EST. GFR  (AFRICAN AMERICAN): 40.9 ML/MIN/1.73 M^2
EST. GFR  (AFRICAN AMERICAN): 45.9 ML/MIN/1.73 M^2
EST. GFR  (AFRICAN AMERICAN): 45.9 ML/MIN/1.73 M^2
EST. GFR  (AFRICAN AMERICAN): 52.1 ML/MIN/1.73 M^2
EST. GFR  (AFRICAN AMERICAN): 55.9 ML/MIN/1.73 M^2
EST. GFR  (AFRICAN AMERICAN): 55.9 ML/MIN/1.73 M^2
EST. GFR  (AFRICAN AMERICAN): >60 ML/MIN/1.73 M^2
EST. GFR  (NON AFRICAN AMERICAN): 13.1 ML/MIN/1.73 M^2
EST. GFR  (NON AFRICAN AMERICAN): 20.2 ML/MIN/1.73 M^2
EST. GFR  (NON AFRICAN AMERICAN): 25.3 ML/MIN/1.73 M^2
EST. GFR  (NON AFRICAN AMERICAN): 25.3 ML/MIN/1.73 M^2
EST. GFR  (NON AFRICAN AMERICAN): 28.9 ML/MIN/1.73 M^2
EST. GFR  (NON AFRICAN AMERICAN): 28.9 ML/MIN/1.73 M^2
EST. GFR  (NON AFRICAN AMERICAN): 35.4 ML/MIN/1.73 M^2
EST. GFR  (NON AFRICAN AMERICAN): 39.7 ML/MIN/1.73 M^2
EST. GFR  (NON AFRICAN AMERICAN): 39.7 ML/MIN/1.73 M^2
EST. GFR  (NON AFRICAN AMERICAN): 45.1 ML/MIN/1.73 M^2
EST. GFR  (NON AFRICAN AMERICAN): 48.3 ML/MIN/1.73 M^2
EST. GFR  (NON AFRICAN AMERICAN): 48.3 ML/MIN/1.73 M^2
EST. GFR  (NON AFRICAN AMERICAN): 52 ML/MIN/1.73 M^2
EST. GFR  (NON AFRICAN AMERICAN): 56.2 ML/MIN/1.73 M^2
EST. GFR  (NON AFRICAN AMERICAN): 56.2 ML/MIN/1.73 M^2
EST. GFR  (NON AFRICAN AMERICAN): >60 ML/MIN/1.73 M^2
ESTIMATED AVG GLUCOSE: 105 MG/DL (ref 68–131)
ETHANOL UR-MCNC: <10 MG/DL
FIBRINOGEN PPP-MCNC: 240 MG/DL (ref 182–366)
FIBRINOGEN PPP-MCNC: 337 MG/DL (ref 182–366)
FIO2: 10
FIO2: 100
FIO2: 70
FIO2: 70
FIO2: 90
FRACTIONAL SHORTENING: 13 % (ref 28–44)
GIANT PLATELETS BLD QL SMEAR: PRESENT
GLUCOSE SERPL-MCNC: 100 MG/DL (ref 70–110)
GLUCOSE SERPL-MCNC: 100 MG/DL (ref 70–110)
GLUCOSE SERPL-MCNC: 101 MG/DL (ref 70–110)
GLUCOSE SERPL-MCNC: 103 MG/DL (ref 70–110)
GLUCOSE SERPL-MCNC: 104 MG/DL (ref 70–110)
GLUCOSE SERPL-MCNC: 105 MG/DL (ref 70–110)
GLUCOSE SERPL-MCNC: 112 MG/DL (ref 70–110)
GLUCOSE SERPL-MCNC: 114 MG/DL (ref 70–110)
GLUCOSE SERPL-MCNC: 116 MG/DL (ref 70–110)
GLUCOSE SERPL-MCNC: 117 MG/DL (ref 70–110)
GLUCOSE SERPL-MCNC: 150 MG/DL (ref 70–110)
GLUCOSE SERPL-MCNC: 202 MG/DL (ref 70–110)
GLUCOSE SERPL-MCNC: 283 MG/DL (ref 70–110)
GLUCOSE SERPL-MCNC: 348 MG/DL (ref 70–110)
GLUCOSE SERPL-MCNC: 362 MG/DL (ref 70–110)
GLUCOSE SERPL-MCNC: 79 MG/DL (ref 70–110)
GLUCOSE SERPL-MCNC: 79 MG/DL (ref 70–110)
GLUCOSE SERPL-MCNC: 80 MG/DL (ref 70–110)
GLUCOSE SERPL-MCNC: 84 MG/DL (ref 70–110)
GLUCOSE SERPL-MCNC: 84 MG/DL (ref 70–110)
GLUCOSE SERPL-MCNC: 85 MG/DL (ref 70–110)
GLUCOSE SERPL-MCNC: 87 MG/DL (ref 70–110)
GLUCOSE SERPL-MCNC: 87 MG/DL (ref 70–110)
GLUCOSE SERPL-MCNC: 88 MG/DL (ref 70–110)
GLUCOSE SERPL-MCNC: 90 MG/DL (ref 70–110)
GLUCOSE SERPL-MCNC: 90 MG/DL (ref 70–110)
GLUCOSE SERPL-MCNC: 92 MG/DL (ref 70–110)
GLUCOSE SERPL-MCNC: 93 MG/DL (ref 70–110)
GLUCOSE SERPL-MCNC: 95 MG/DL (ref 70–110)
GLUCOSE SERPL-MCNC: 95 MG/DL (ref 70–110)
GLUCOSE SERPL-MCNC: 96 MG/DL (ref 70–110)
GLUCOSE SERPL-MCNC: 97 MG/DL (ref 70–110)
GLUCOSE SERPL-MCNC: 98 MG/DL (ref 70–110)
GLUCOSE SERPL-MCNC: 99 MG/DL (ref 70–110)
HBA1C MFR BLD HPLC: 5.3 % (ref 4–5.6)
HCO3 UR-SCNC: 11.4 MMOL/L (ref 24–28)
HCO3 UR-SCNC: 13.2 MMOL/L (ref 24–28)
HCO3 UR-SCNC: 13.7 MMOL/L (ref 24–28)
HCO3 UR-SCNC: 14.3 MMOL/L (ref 24–28)
HCO3 UR-SCNC: 14.3 MMOL/L (ref 24–28)
HCO3 UR-SCNC: 14.6 MMOL/L (ref 24–28)
HCO3 UR-SCNC: 14.7 MMOL/L (ref 24–28)
HCO3 UR-SCNC: 15.2 MMOL/L (ref 24–28)
HCO3 UR-SCNC: 15.6 MMOL/L (ref 24–28)
HCO3 UR-SCNC: 15.8 MMOL/L (ref 24–28)
HCO3 UR-SCNC: 16 MMOL/L (ref 24–28)
HCO3 UR-SCNC: 16.4 MMOL/L (ref 24–28)
HCO3 UR-SCNC: 16.9 MMOL/L (ref 24–28)
HCO3 UR-SCNC: 17 MMOL/L (ref 24–28)
HCO3 UR-SCNC: 17.7 MMOL/L (ref 24–28)
HCO3 UR-SCNC: 17.7 MMOL/L (ref 24–28)
HCO3 UR-SCNC: 18.5 MMOL/L (ref 24–28)
HCO3 UR-SCNC: 18.7 MMOL/L (ref 24–28)
HCO3 UR-SCNC: 19 MMOL/L (ref 24–28)
HCO3 UR-SCNC: 19.3 MMOL/L (ref 24–28)
HCO3 UR-SCNC: 19.5 MMOL/L (ref 24–28)
HCO3 UR-SCNC: 19.9 MMOL/L (ref 24–28)
HCO3 UR-SCNC: 20.6 MMOL/L (ref 24–28)
HCO3 UR-SCNC: 20.8 MMOL/L (ref 24–28)
HCO3 UR-SCNC: 20.8 MMOL/L (ref 24–28)
HCO3 UR-SCNC: 21.2 MMOL/L (ref 24–28)
HCO3 UR-SCNC: 21.2 MMOL/L (ref 24–28)
HCO3 UR-SCNC: 21.3 MMOL/L (ref 24–28)
HCO3 UR-SCNC: 21.4 MMOL/L (ref 24–28)
HCO3 UR-SCNC: 21.6 MMOL/L (ref 24–28)
HCO3 UR-SCNC: 21.7 MMOL/L (ref 24–28)
HCO3 UR-SCNC: 21.8 MMOL/L (ref 24–28)
HCO3 UR-SCNC: 21.9 MMOL/L (ref 24–28)
HCO3 UR-SCNC: 22 MMOL/L (ref 24–28)
HCO3 UR-SCNC: 22 MMOL/L (ref 24–28)
HCO3 UR-SCNC: 22.1 MMOL/L (ref 24–28)
HCO3 UR-SCNC: 22.2 MMOL/L (ref 24–28)
HCO3 UR-SCNC: 22.4 MMOL/L (ref 24–28)
HCO3 UR-SCNC: 22.5 MMOL/L (ref 24–28)
HCO3 UR-SCNC: 22.6 MMOL/L (ref 24–28)
HCO3 UR-SCNC: 22.8 MMOL/L (ref 24–28)
HCO3 UR-SCNC: 22.9 MMOL/L (ref 24–28)
HCO3 UR-SCNC: 22.9 MMOL/L (ref 24–28)
HCO3 UR-SCNC: 23 MMOL/L (ref 24–28)
HCO3 UR-SCNC: 23 MMOL/L (ref 24–28)
HCO3 UR-SCNC: 23.1 MMOL/L (ref 24–28)
HCO3 UR-SCNC: 23.3 MMOL/L (ref 24–28)
HCO3 UR-SCNC: 23.4 MMOL/L (ref 24–28)
HCO3 UR-SCNC: 23.5 MMOL/L (ref 24–28)
HCO3 UR-SCNC: 23.7 MMOL/L (ref 24–28)
HCO3 UR-SCNC: 24 MMOL/L (ref 24–28)
HCO3 UR-SCNC: 24.1 MMOL/L (ref 24–28)
HCO3 UR-SCNC: 24.6 MMOL/L (ref 24–28)
HCO3 UR-SCNC: 24.7 MMOL/L (ref 24–28)
HCO3 UR-SCNC: 24.9 MMOL/L (ref 24–28)
HCO3 UR-SCNC: 24.9 MMOL/L (ref 24–28)
HCO3 UR-SCNC: 25.1 MMOL/L (ref 24–28)
HCO3 UR-SCNC: 25.2 MMOL/L (ref 24–28)
HCO3 UR-SCNC: 25.4 MMOL/L (ref 24–28)
HCO3 UR-SCNC: 25.5 MMOL/L (ref 24–28)
HCO3 UR-SCNC: 25.6 MMOL/L (ref 24–28)
HCO3 UR-SCNC: 25.6 MMOL/L (ref 24–28)
HCO3 UR-SCNC: 25.7 MMOL/L (ref 24–28)
HCO3 UR-SCNC: 25.8 MMOL/L (ref 24–28)
HCO3 UR-SCNC: 25.9 MMOL/L (ref 24–28)
HCO3 UR-SCNC: 26 MMOL/L (ref 24–28)
HCO3 UR-SCNC: 26.2 MMOL/L (ref 24–28)
HCO3 UR-SCNC: 26.3 MMOL/L (ref 24–28)
HCO3 UR-SCNC: 26.4 MMOL/L (ref 24–28)
HCO3 UR-SCNC: 26.5 MMOL/L (ref 24–28)
HCO3 UR-SCNC: 26.6 MMOL/L (ref 24–28)
HCO3 UR-SCNC: 26.7 MMOL/L (ref 24–28)
HCO3 UR-SCNC: 26.8 MMOL/L (ref 24–28)
HCO3 UR-SCNC: 26.8 MMOL/L (ref 24–28)
HCO3 UR-SCNC: 26.9 MMOL/L (ref 24–28)
HCO3 UR-SCNC: 26.9 MMOL/L (ref 24–28)
HCO3 UR-SCNC: 27 MMOL/L (ref 24–28)
HCO3 UR-SCNC: 27.1 MMOL/L (ref 24–28)
HCO3 UR-SCNC: 27.2 MMOL/L (ref 24–28)
HCO3 UR-SCNC: 27.3 MMOL/L (ref 24–28)
HCO3 UR-SCNC: 27.5 MMOL/L (ref 24–28)
HCO3 UR-SCNC: 27.6 MMOL/L (ref 24–28)
HCO3 UR-SCNC: 27.7 MMOL/L (ref 24–28)
HCO3 UR-SCNC: 27.7 MMOL/L (ref 24–28)
HCO3 UR-SCNC: 27.8 MMOL/L (ref 24–28)
HCO3 UR-SCNC: 27.9 MMOL/L (ref 24–28)
HCO3 UR-SCNC: 27.9 MMOL/L (ref 24–28)
HCO3 UR-SCNC: 28.1 MMOL/L (ref 24–28)
HCO3 UR-SCNC: 28.2 MMOL/L (ref 24–28)
HCO3 UR-SCNC: 28.3 MMOL/L (ref 24–28)
HCO3 UR-SCNC: 28.4 MMOL/L (ref 24–28)
HCO3 UR-SCNC: 28.5 MMOL/L (ref 24–28)
HCO3 UR-SCNC: 28.6 MMOL/L (ref 24–28)
HCO3 UR-SCNC: 28.7 MMOL/L (ref 24–28)
HCO3 UR-SCNC: 28.8 MMOL/L (ref 24–28)
HCO3 UR-SCNC: 28.8 MMOL/L (ref 24–28)
HCO3 UR-SCNC: 28.9 MMOL/L (ref 24–28)
HCO3 UR-SCNC: 28.9 MMOL/L (ref 24–28)
HCO3 UR-SCNC: 29.2 MMOL/L (ref 24–28)
HCO3 UR-SCNC: 29.4 MMOL/L (ref 24–28)
HCO3 UR-SCNC: 29.5 MMOL/L (ref 24–28)
HCO3 UR-SCNC: 29.6 MMOL/L (ref 24–28)
HCO3 UR-SCNC: 29.8 MMOL/L (ref 24–28)
HCO3 UR-SCNC: 29.8 MMOL/L (ref 24–28)
HCO3 UR-SCNC: 29.9 MMOL/L (ref 24–28)
HCO3 UR-SCNC: 30 MMOL/L (ref 24–28)
HCO3 UR-SCNC: 30.1 MMOL/L (ref 24–28)
HCO3 UR-SCNC: 30.3 MMOL/L (ref 24–28)
HCO3 UR-SCNC: 31 MMOL/L (ref 24–28)
HCT VFR BLD AUTO: 25.6 % (ref 40–54)
HCT VFR BLD AUTO: 27 % (ref 40–54)
HCT VFR BLD AUTO: 27.6 % (ref 40–54)
HCT VFR BLD AUTO: 27.7 % (ref 40–54)
HCT VFR BLD AUTO: 27.7 % (ref 40–54)
HCT VFR BLD AUTO: 28.1 % (ref 40–54)
HCT VFR BLD AUTO: 28.2 % (ref 40–54)
HCT VFR BLD AUTO: 28.4 % (ref 40–54)
HCT VFR BLD AUTO: 28.6 % (ref 40–54)
HCT VFR BLD AUTO: 28.6 % (ref 40–54)
HCT VFR BLD AUTO: 28.7 % (ref 40–54)
HCT VFR BLD AUTO: 28.7 % (ref 40–54)
HCT VFR BLD AUTO: 28.8 % (ref 40–54)
HCT VFR BLD AUTO: 28.8 % (ref 40–54)
HCT VFR BLD AUTO: 28.9 % (ref 40–54)
HCT VFR BLD AUTO: 29 % (ref 40–54)
HCT VFR BLD AUTO: 29.2 % (ref 40–54)
HCT VFR BLD AUTO: 29.2 % (ref 40–54)
HCT VFR BLD AUTO: 29.3 % (ref 40–54)
HCT VFR BLD AUTO: 29.4 % (ref 40–54)
HCT VFR BLD AUTO: 29.5 % (ref 40–54)
HCT VFR BLD AUTO: 29.6 % (ref 40–54)
HCT VFR BLD AUTO: 29.7 % (ref 40–54)
HCT VFR BLD AUTO: 29.8 % (ref 40–54)
HCT VFR BLD AUTO: 29.9 % (ref 40–54)
HCT VFR BLD AUTO: 30.3 % (ref 40–54)
HCT VFR BLD AUTO: 32.7 % (ref 40–54)
HCT VFR BLD AUTO: 33.6 % (ref 40–54)
HCT VFR BLD AUTO: 34 % (ref 40–54)
HCT VFR BLD AUTO: 34.3 % (ref 40–54)
HCT VFR BLD AUTO: 36.2 % (ref 40–54)
HCT VFR BLD AUTO: 42.4 % (ref 40–54)
HCT VFR BLD AUTO: 44.5 % (ref 40–54)
HCT VFR BLD CALC: 15 %PCV (ref 36–54)
HCT VFR BLD CALC: 15 %PCV (ref 36–54)
HCT VFR BLD CALC: 24 %PCV (ref 36–54)
HCT VFR BLD CALC: 25 %PCV (ref 36–54)
HCT VFR BLD CALC: 26 %PCV (ref 36–54)
HCT VFR BLD CALC: 27 %PCV (ref 36–54)
HCT VFR BLD CALC: 28 %PCV (ref 36–54)
HCT VFR BLD CALC: 29 %PCV (ref 36–54)
HCT VFR BLD CALC: 30 %PCV (ref 36–54)
HCT VFR BLD CALC: 31 %PCV (ref 36–54)
HCT VFR BLD CALC: 32 %PCV (ref 36–54)
HCT VFR BLD CALC: 33 %PCV (ref 36–54)
HCT VFR BLD CALC: 34 %PCV (ref 36–54)
HCT VFR BLD CALC: 35 %PCV (ref 36–54)
HCT VFR BLD CALC: 39 %PCV (ref 36–54)
HDLC SERPL-MCNC: 25 MG/DL (ref 40–75)
HDLC SERPL: 12.9 % (ref 20–50)
HEPARIN INDUCED THROMBOCYTOPENIA: NORMAL
HGB BLD-MCNC: 10 G/DL (ref 14–18)
HGB BLD-MCNC: 10.1 G/DL (ref 14–18)
HGB BLD-MCNC: 10.2 G/DL (ref 14–18)
HGB BLD-MCNC: 10.3 G/DL (ref 14–18)
HGB BLD-MCNC: 10.7 G/DL (ref 14–18)
HGB BLD-MCNC: 11.3 G/DL (ref 14–18)
HGB BLD-MCNC: 11.4 G/DL (ref 14–18)
HGB BLD-MCNC: 11.4 G/DL (ref 14–18)
HGB BLD-MCNC: 11.5 G/DL (ref 14–18)
HGB BLD-MCNC: 13.2 G/DL (ref 14–18)
HGB BLD-MCNC: 13.6 G/DL (ref 14–18)
HGB BLD-MCNC: 8.1 G/DL (ref 14–18)
HGB BLD-MCNC: 9.1 G/DL (ref 14–18)
HGB BLD-MCNC: 9.2 G/DL (ref 14–18)
HGB BLD-MCNC: 9.3 G/DL (ref 14–18)
HGB BLD-MCNC: 9.3 G/DL (ref 14–18)
HGB BLD-MCNC: 9.4 G/DL (ref 14–18)
HGB BLD-MCNC: 9.5 G/DL (ref 14–18)
HGB BLD-MCNC: 9.6 G/DL (ref 14–18)
HGB BLD-MCNC: 9.7 G/DL (ref 14–18)
HGB BLD-MCNC: 9.8 G/DL (ref 14–18)
HGB BLD-MCNC: 9.9 G/DL (ref 14–18)
HGB BLD-MCNC: 9.9 G/DL (ref 14–18)
HOWELL-JOLLY BOD BLD QL SMEAR: ABNORMAL
HYPOCHROMIA BLD QL SMEAR: ABNORMAL
IMM GRANULOCYTES # BLD AUTO: 0.12 K/UL (ref 0–0.04)
IMM GRANULOCYTES # BLD AUTO: 0.15 K/UL (ref 0–0.04)
IMM GRANULOCYTES # BLD AUTO: 0.18 K/UL (ref 0–0.04)
IMM GRANULOCYTES # BLD AUTO: 0.19 K/UL (ref 0–0.04)
IMM GRANULOCYTES # BLD AUTO: 0.21 K/UL (ref 0–0.04)
IMM GRANULOCYTES # BLD AUTO: 0.22 K/UL (ref 0–0.04)
IMM GRANULOCYTES # BLD AUTO: 0.22 K/UL (ref 0–0.04)
IMM GRANULOCYTES # BLD AUTO: 0.24 K/UL (ref 0–0.04)
IMM GRANULOCYTES # BLD AUTO: 0.24 K/UL (ref 0–0.04)
IMM GRANULOCYTES # BLD AUTO: 0.26 K/UL (ref 0–0.04)
IMM GRANULOCYTES # BLD AUTO: 0.28 K/UL (ref 0–0.04)
IMM GRANULOCYTES # BLD AUTO: 0.3 K/UL (ref 0–0.04)
IMM GRANULOCYTES # BLD AUTO: 0.33 K/UL (ref 0–0.04)
IMM GRANULOCYTES # BLD AUTO: 0.36 K/UL (ref 0–0.04)
IMM GRANULOCYTES # BLD AUTO: 0.39 K/UL (ref 0–0.04)
IMM GRANULOCYTES # BLD AUTO: 0.4 K/UL (ref 0–0.04)
IMM GRANULOCYTES # BLD AUTO: 0.66 K/UL (ref 0–0.04)
IMM GRANULOCYTES # BLD AUTO: 0.68 K/UL (ref 0–0.04)
IMM GRANULOCYTES # BLD AUTO: 0.7 K/UL (ref 0–0.04)
IMM GRANULOCYTES # BLD AUTO: 0.85 K/UL (ref 0–0.04)
IMM GRANULOCYTES # BLD AUTO: 0.86 K/UL (ref 0–0.04)
IMM GRANULOCYTES # BLD AUTO: ABNORMAL K/UL
IMM GRANULOCYTES # BLD AUTO: ABNORMAL K/UL (ref 0–0.04)
IMM GRANULOCYTES NFR BLD AUTO: 0.6 % (ref 0–0.5)
IMM GRANULOCYTES NFR BLD AUTO: 0.9 % (ref 0–0.5)
IMM GRANULOCYTES NFR BLD AUTO: 1.1 % (ref 0–0.5)
IMM GRANULOCYTES NFR BLD AUTO: 1.1 % (ref 0–0.5)
IMM GRANULOCYTES NFR BLD AUTO: 1.3 % (ref 0–0.5)
IMM GRANULOCYTES NFR BLD AUTO: 1.3 % (ref 0–0.5)
IMM GRANULOCYTES NFR BLD AUTO: 1.5 % (ref 0–0.5)
IMM GRANULOCYTES NFR BLD AUTO: 1.5 % (ref 0–0.5)
IMM GRANULOCYTES NFR BLD AUTO: 1.6 % (ref 0–0.5)
IMM GRANULOCYTES NFR BLD AUTO: 1.7 % (ref 0–0.5)
IMM GRANULOCYTES NFR BLD AUTO: 1.8 % (ref 0–0.5)
IMM GRANULOCYTES NFR BLD AUTO: 1.9 % (ref 0–0.5)
IMM GRANULOCYTES NFR BLD AUTO: 1.9 % (ref 0–0.5)
IMM GRANULOCYTES NFR BLD AUTO: 2.1 % (ref 0–0.5)
IMM GRANULOCYTES NFR BLD AUTO: 2.3 % (ref 0–0.5)
IMM GRANULOCYTES NFR BLD AUTO: 2.5 % (ref 0–0.5)
IMM GRANULOCYTES NFR BLD AUTO: 2.7 % (ref 0–0.5)
IMM GRANULOCYTES NFR BLD AUTO: 3.2 % (ref 0–0.5)
IMM GRANULOCYTES NFR BLD AUTO: 3.7 % (ref 0–0.5)
IMM GRANULOCYTES NFR BLD AUTO: 4 % (ref 0–0.5)
IMM GRANULOCYTES NFR BLD AUTO: 4.4 % (ref 0–0.5)
IMM GRANULOCYTES NFR BLD AUTO: ABNORMAL %
IMM GRANULOCYTES NFR BLD AUTO: ABNORMAL % (ref 0–0.5)
INR PPP: 1.1 (ref 0.8–1.2)
INR PPP: 1.6 (ref 0.8–1.2)
INR PPP: 1.7 (ref 0.8–1.2)
INR PPP: 1.8 (ref 0.8–1.2)
INR PPP: 1.9 (ref 0.8–1.2)
INR PPP: 2.1 (ref 0.8–1.2)
INR PPP: 2.1 (ref 0.8–1.2)
INR PPP: 2.2 (ref 0.8–1.2)
INR PPP: 2.2 (ref 0.8–1.2)
INR PPP: 2.3 (ref 0.8–1.2)
INR PPP: 2.3 (ref 0.8–1.2)
INR PPP: 2.4 (ref 0.8–1.2)
INR PPP: 2.5 (ref 0.8–1.2)
INR PPP: 2.5 (ref 0.8–1.2)
INR PPP: 2.6 (ref 0.8–1.2)
INR PPP: 3.1 (ref 0.8–1.2)
INR PPP: 3.1 (ref 0.8–1.2)
INR PPP: 3.2 (ref 0.8–1.2)
INR PPP: 3.3 (ref 0.8–1.2)
INR PPP: 3.3 (ref 0.8–1.2)
INR PPP: 3.5 (ref 0.8–1.2)
INTERVENTRICULAR SEPTUM: 1.06 CM (ref 0.6–1.1)
LA MAJOR: 3.96 CM
LA MINOR: 4.79 CM
LA WIDTH: 3 CM
LACTATE SERPL-SCNC: 10.6 MMOL/L (ref 0.5–2.2)
LACTATE SERPL-SCNC: 9.9 MMOL/L (ref 0.5–2.2)
LACTATE SERPL-SCNC: >12 MMOL/L (ref 0.5–2.2)
LACTATE SERPL-SCNC: >12 MMOL/L (ref 0.5–2.2)
LDH SERPL L TO P-CCNC: 10.14 MMOL/L (ref 0.5–2.2)
LDH SERPL L TO P-CCNC: 10.7 MMOL/L (ref 0.36–1.25)
LDH SERPL L TO P-CCNC: 10.72 MMOL/L (ref 0.36–1.25)
LDH SERPL L TO P-CCNC: 10.98 MMOL/L (ref 0.36–1.25)
LDH SERPL L TO P-CCNC: 11.14 MMOL/L (ref 0.36–1.25)
LDH SERPL L TO P-CCNC: 11.33 MMOL/L (ref 0.36–1.25)
LDH SERPL L TO P-CCNC: 11.35 MMOL/L (ref 0.36–1.25)
LDH SERPL L TO P-CCNC: 11.5 MMOL/L (ref 0.36–1.25)
LDH SERPL L TO P-CCNC: 11.51 MMOL/L (ref 0.36–1.25)
LDH SERPL L TO P-CCNC: 11.72 MMOL/L (ref 0.36–1.25)
LDH SERPL L TO P-CCNC: 11.81 MMOL/L (ref 0.36–1.25)
LDH SERPL L TO P-CCNC: 12.04 MMOL/L (ref 0.36–1.25)
LDH SERPL L TO P-CCNC: 12.64 MMOL/L (ref 0.36–1.25)
LDH SERPL L TO P-CCNC: 13.29 MMOL/L (ref 0.36–1.25)
LDH SERPL L TO P-CCNC: 14.67 MMOL/L (ref 0.36–1.25)
LDH SERPL L TO P-CCNC: 1906 U/L (ref 110–260)
LDH SERPL L TO P-CCNC: 2.62 MMOL/L (ref 0.36–1.25)
LDH SERPL L TO P-CCNC: 2.73 MMOL/L (ref 0.36–1.25)
LDH SERPL L TO P-CCNC: 2.78 MMOL/L (ref 0.36–1.25)
LDH SERPL L TO P-CCNC: 2.97 MMOL/L (ref 0.36–1.25)
LDH SERPL L TO P-CCNC: 2380 U/L (ref 110–260)
LDH SERPL L TO P-CCNC: 3.07 MMOL/L (ref 0.36–1.25)
LDH SERPL L TO P-CCNC: 3.16 MMOL/L (ref 0.36–1.25)
LDH SERPL L TO P-CCNC: 3.23 MMOL/L (ref 0.36–1.25)
LDH SERPL L TO P-CCNC: 3.24 MMOL/L (ref 0.36–1.25)
LDH SERPL L TO P-CCNC: 3.25 MMOL/L (ref 0.36–1.25)
LDH SERPL L TO P-CCNC: 3.33 MMOL/L (ref 0.36–1.25)
LDH SERPL L TO P-CCNC: 3.42 MMOL/L (ref 0.36–1.25)
LDH SERPL L TO P-CCNC: 3.45 MMOL/L (ref 0.36–1.25)
LDH SERPL L TO P-CCNC: 3.76 MMOL/L (ref 0.36–1.25)
LDH SERPL L TO P-CCNC: 3101 U/L (ref 110–260)
LDH SERPL L TO P-CCNC: 3206 U/L (ref 110–260)
LDH SERPL L TO P-CCNC: 4.09 MMOL/L (ref 0.36–1.25)
LDH SERPL L TO P-CCNC: 4.22 MMOL/L (ref 0.36–1.25)
LDH SERPL L TO P-CCNC: 4.23 MMOL/L (ref 0.36–1.25)
LDH SERPL L TO P-CCNC: 4.24 MMOL/L (ref 0.36–1.25)
LDH SERPL L TO P-CCNC: 4.26 MMOL/L (ref 0.36–1.25)
LDH SERPL L TO P-CCNC: 4.26 MMOL/L (ref 0.36–1.25)
LDH SERPL L TO P-CCNC: 4.4 MMOL/L (ref 0.36–1.25)
LDH SERPL L TO P-CCNC: 4.55 MMOL/L (ref 0.36–1.25)
LDH SERPL L TO P-CCNC: 4.65 MMOL/L (ref 0.36–1.25)
LDH SERPL L TO P-CCNC: 4.65 MMOL/L (ref 0.36–1.25)
LDH SERPL L TO P-CCNC: 4.79 MMOL/L (ref 0.36–1.25)
LDH SERPL L TO P-CCNC: 4.87 MMOL/L (ref 0.36–1.25)
LDH SERPL L TO P-CCNC: 4.96 MMOL/L (ref 0.36–1.25)
LDH SERPL L TO P-CCNC: 4.96 MMOL/L (ref 0.36–1.25)
LDH SERPL L TO P-CCNC: 5.12 MMOL/L (ref 0.36–1.25)
LDH SERPL L TO P-CCNC: 5.24 MMOL/L (ref 0.36–1.25)
LDH SERPL L TO P-CCNC: 5.3 MMOL/L (ref 0.36–1.25)
LDH SERPL L TO P-CCNC: 5.6 MMOL/L (ref 0.36–1.25)
LDH SERPL L TO P-CCNC: 5.87 MMOL/L (ref 0.36–1.25)
LDH SERPL L TO P-CCNC: 5.98 MMOL/L (ref 0.36–1.25)
LDH SERPL L TO P-CCNC: 6 MMOL/L (ref 0.36–1.25)
LDH SERPL L TO P-CCNC: 6.14 MMOL/L (ref 0.36–1.25)
LDH SERPL L TO P-CCNC: 6.25 MMOL/L (ref 0.36–1.25)
LDH SERPL L TO P-CCNC: 6.32 MMOL/L (ref 0.36–1.25)
LDH SERPL L TO P-CCNC: 6.32 MMOL/L (ref 0.36–1.25)
LDH SERPL L TO P-CCNC: 6.33 MMOL/L (ref 0.36–1.25)
LDH SERPL L TO P-CCNC: 6.33 MMOL/L (ref 0.36–1.25)
LDH SERPL L TO P-CCNC: 6.65 MMOL/L (ref 0.36–1.25)
LDH SERPL L TO P-CCNC: 6.95 MMOL/L (ref 0.36–1.25)
LDH SERPL L TO P-CCNC: 7.26 MMOL/L (ref 0.36–1.25)
LDH SERPL L TO P-CCNC: 7792 U/L (ref 110–260)
LDH SERPL L TO P-CCNC: 9.02 MMOL/L (ref 0.36–1.25)
LDH SERPL L TO P-CCNC: 9.05 MMOL/L (ref 0.36–1.25)
LDH SERPL L TO P-CCNC: 9.82 MMOL/L (ref 0.36–1.25)
LDH SERPL L TO P-CCNC: 9.83 MMOL/L (ref 0.36–1.25)
LDH SERPL L TO P-CCNC: ABNORMAL U/L (ref 110–260)
LDLC SERPL CALC-MCNC: 117.8 MG/DL (ref 63–159)
LEFT ATRIUM SIZE: 4.02 CM
LEFT ATRIUM VOLUME INDEX: 20.3 ML/M2
LEFT ATRIUM VOLUME: 44.44 CM3
LEFT AXILLARY ARTERY: 0.66 CM/S
LEFT DIST SUBCLAVIAN ARTERY: 0.67 CM
LEFT INTERNAL DIMENSION IN SYSTOLE: 4.53 CM (ref 2.1–4)
LEFT MID SUBCLAVIAN ARTERY: 0.67 CM/S
LEFT PROX SUBCLAVIAN ARTERY: 0.57 CM/S
LEFT VENTRICLE DIASTOLIC VOLUME INDEX: 58.67 ML/M2
LEFT VENTRICLE DIASTOLIC VOLUME: 128.37 ML
LEFT VENTRICLE MASS INDEX: 92.9 G/M2
LEFT VENTRICLE SYSTOLIC VOLUME INDEX: 43 ML/M2
LEFT VENTRICLE SYSTOLIC VOLUME: 93.99 ML
LEFT VENTRICULAR INTERNAL DIMENSION IN DIASTOLE: 5.18 CM (ref 3.5–6)
LEFT VENTRICULAR MASS: 203.33 G
LIDOCAIN SERPL-MCNC: 8.7 MCG/ML (ref 1.5–5)
LIDOCAIN SERPL-MCNC: 9.7 MCG/ML (ref 1.5–5)
LYMPHOCYTES # BLD AUTO: 0.3 K/UL (ref 1–4.8)
LYMPHOCYTES # BLD AUTO: 0.4 K/UL (ref 1–4.8)
LYMPHOCYTES # BLD AUTO: 0.4 K/UL (ref 1–4.8)
LYMPHOCYTES # BLD AUTO: 0.5 K/UL (ref 1–4.8)
LYMPHOCYTES # BLD AUTO: 0.6 K/UL (ref 1–4.8)
LYMPHOCYTES # BLD AUTO: 0.7 K/UL (ref 1–4.8)
LYMPHOCYTES # BLD AUTO: 0.8 K/UL (ref 1–4.8)
LYMPHOCYTES # BLD AUTO: 0.9 K/UL (ref 1–4.8)
LYMPHOCYTES # BLD AUTO: 0.9 K/UL (ref 1–4.8)
LYMPHOCYTES # BLD AUTO: 1 K/UL (ref 1–4.8)
LYMPHOCYTES # BLD AUTO: 1.2 K/UL (ref 1–4.8)
LYMPHOCYTES # BLD AUTO: 1.3 K/UL (ref 1–4.8)
LYMPHOCYTES # BLD AUTO: 1.5 K/UL (ref 1–4.8)
LYMPHOCYTES # BLD AUTO: 1.9 K/UL (ref 1–4.8)
LYMPHOCYTES # BLD AUTO: 2.5 K/UL (ref 1–4.8)
LYMPHOCYTES # BLD AUTO: 2.6 K/UL (ref 1–4.8)
LYMPHOCYTES # BLD AUTO: 3.1 K/UL (ref 1–4.8)
LYMPHOCYTES # BLD AUTO: 3.8 K/UL (ref 1–4.8)
LYMPHOCYTES # BLD AUTO: 5.7 K/UL (ref 1–4.8)
LYMPHOCYTES # BLD AUTO: ABNORMAL K/UL (ref 1–4.8)
LYMPHOCYTES NFR BLD: 1 % (ref 18–48)
LYMPHOCYTES NFR BLD: 10 % (ref 18–48)
LYMPHOCYTES NFR BLD: 10.9 % (ref 18–48)
LYMPHOCYTES NFR BLD: 12.2 % (ref 18–48)
LYMPHOCYTES NFR BLD: 12.4 % (ref 18–48)
LYMPHOCYTES NFR BLD: 14.2 % (ref 18–48)
LYMPHOCYTES NFR BLD: 2.1 % (ref 18–48)
LYMPHOCYTES NFR BLD: 2.6 % (ref 18–48)
LYMPHOCYTES NFR BLD: 27.7 % (ref 18–48)
LYMPHOCYTES NFR BLD: 3 % (ref 18–48)
LYMPHOCYTES NFR BLD: 3.8 % (ref 18–48)
LYMPHOCYTES NFR BLD: 3.9 % (ref 18–48)
LYMPHOCYTES NFR BLD: 4 % (ref 18–48)
LYMPHOCYTES NFR BLD: 4.2 % (ref 18–48)
LYMPHOCYTES NFR BLD: 4.3 % (ref 18–48)
LYMPHOCYTES NFR BLD: 4.8 % (ref 18–48)
LYMPHOCYTES NFR BLD: 5 % (ref 18–48)
LYMPHOCYTES NFR BLD: 5.1 % (ref 18–48)
LYMPHOCYTES NFR BLD: 5.4 % (ref 18–48)
LYMPHOCYTES NFR BLD: 5.5 % (ref 18–48)
LYMPHOCYTES NFR BLD: 6 % (ref 18–48)
LYMPHOCYTES NFR BLD: 6 % (ref 18–48)
LYMPHOCYTES NFR BLD: 7 % (ref 18–48)
LYMPHOCYTES NFR BLD: 7.6 % (ref 18–48)
LYMPHOCYTES NFR BLD: 8.6 % (ref 18–48)
LYMPHOCYTES NFR BLD: 8.9 % (ref 18–48)
LYMPHOCYTES NFR BLD: 9 % (ref 18–48)
LYMPHOCYTES NFR BLD: 9.4 % (ref 18–48)
LYMPHOCYTES NFR BLD: 9.7 % (ref 18–48)
MAGNESIUM SERPL-MCNC: 1.5 MG/DL (ref 1.6–2.6)
MAGNESIUM SERPL-MCNC: 1.8 MG/DL (ref 1.6–2.6)
MAGNESIUM SERPL-MCNC: 1.8 MG/DL (ref 1.6–2.6)
MAGNESIUM SERPL-MCNC: 1.9 MG/DL (ref 1.6–2.6)
MAGNESIUM SERPL-MCNC: 2 MG/DL (ref 1.6–2.6)
MAGNESIUM SERPL-MCNC: 2.1 MG/DL (ref 1.6–2.6)
MAGNESIUM SERPL-MCNC: 2.2 MG/DL (ref 1.6–2.6)
MAGNESIUM SERPL-MCNC: 2.3 MG/DL (ref 1.6–2.6)
MAGNESIUM SERPL-MCNC: 2.4 MG/DL (ref 1.6–2.6)
MAGNESIUM SERPL-MCNC: 2.5 MG/DL (ref 1.6–2.6)
MAGNESIUM SERPL-MCNC: 2.6 MG/DL (ref 1.6–2.6)
MAGNESIUM SERPL-MCNC: 2.8 MG/DL (ref 1.6–2.6)
MAGNESIUM SERPL-MCNC: 3.6 MG/DL (ref 1.6–2.6)
MCH RBC QN AUTO: 27.5 PG (ref 27–31)
MCH RBC QN AUTO: 27.5 PG (ref 27–31)
MCH RBC QN AUTO: 28.2 PG (ref 27–31)
MCH RBC QN AUTO: 28.2 PG (ref 27–31)
MCH RBC QN AUTO: 28.4 PG (ref 27–31)
MCH RBC QN AUTO: 28.4 PG (ref 27–31)
MCH RBC QN AUTO: 28.5 PG (ref 27–31)
MCH RBC QN AUTO: 28.5 PG (ref 27–31)
MCH RBC QN AUTO: 28.6 PG (ref 27–31)
MCH RBC QN AUTO: 28.7 PG (ref 27–31)
MCH RBC QN AUTO: 28.7 PG (ref 27–31)
MCH RBC QN AUTO: 28.8 PG (ref 27–31)
MCH RBC QN AUTO: 28.9 PG (ref 27–31)
MCH RBC QN AUTO: 28.9 PG (ref 27–31)
MCH RBC QN AUTO: 29.1 PG (ref 27–31)
MCH RBC QN AUTO: 29.1 PG (ref 27–31)
MCH RBC QN AUTO: 29.3 PG (ref 27–31)
MCH RBC QN AUTO: 29.4 PG (ref 27–31)
MCH RBC QN AUTO: 29.5 PG (ref 27–31)
MCH RBC QN AUTO: 29.6 PG (ref 27–31)
MCH RBC QN AUTO: 29.8 PG (ref 27–31)
MCH RBC QN AUTO: 29.8 PG (ref 27–31)
MCH RBC QN AUTO: 30 PG (ref 27–31)
MCH RBC QN AUTO: 30.1 PG (ref 27–31)
MCH RBC QN AUTO: 30.1 PG (ref 27–31)
MCH RBC QN AUTO: 30.3 PG (ref 27–31)
MCH RBC QN AUTO: 30.4 PG (ref 27–31)
MCH RBC QN AUTO: 30.6 PG (ref 27–31)
MCH RBC QN AUTO: 30.8 PG (ref 27–31)
MCH RBC QN AUTO: 30.9 PG (ref 27–31)
MCHC RBC AUTO-ENTMCNC: 30.6 G/DL (ref 32–36)
MCHC RBC AUTO-ENTMCNC: 31.1 G/DL (ref 32–36)
MCHC RBC AUTO-ENTMCNC: 31.5 G/DL (ref 32–36)
MCHC RBC AUTO-ENTMCNC: 31.5 G/DL (ref 32–36)
MCHC RBC AUTO-ENTMCNC: 31.6 G/DL (ref 32–36)
MCHC RBC AUTO-ENTMCNC: 31.7 G/DL (ref 32–36)
MCHC RBC AUTO-ENTMCNC: 32 G/DL (ref 32–36)
MCHC RBC AUTO-ENTMCNC: 32.3 G/DL (ref 32–36)
MCHC RBC AUTO-ENTMCNC: 32.4 G/DL (ref 32–36)
MCHC RBC AUTO-ENTMCNC: 32.4 G/DL (ref 32–36)
MCHC RBC AUTO-ENTMCNC: 32.7 G/DL (ref 32–36)
MCHC RBC AUTO-ENTMCNC: 32.9 G/DL (ref 32–36)
MCHC RBC AUTO-ENTMCNC: 33.1 G/DL (ref 32–36)
MCHC RBC AUTO-ENTMCNC: 33.2 G/DL (ref 32–36)
MCHC RBC AUTO-ENTMCNC: 33.2 G/DL (ref 32–36)
MCHC RBC AUTO-ENTMCNC: 33.3 G/DL (ref 32–36)
MCHC RBC AUTO-ENTMCNC: 33.3 G/DL (ref 32–36)
MCHC RBC AUTO-ENTMCNC: 33.4 G/DL (ref 32–36)
MCHC RBC AUTO-ENTMCNC: 33.5 G/DL (ref 32–36)
MCHC RBC AUTO-ENTMCNC: 33.7 G/DL (ref 32–36)
MCHC RBC AUTO-ENTMCNC: 33.9 G/DL (ref 32–36)
MCHC RBC AUTO-ENTMCNC: 34 G/DL (ref 32–36)
MCHC RBC AUTO-ENTMCNC: 34.1 G/DL (ref 32–36)
MCHC RBC AUTO-ENTMCNC: 34.2 G/DL (ref 32–36)
MCHC RBC AUTO-ENTMCNC: 34.2 G/DL (ref 32–36)
MCHC RBC AUTO-ENTMCNC: 34.5 G/DL (ref 32–36)
MCHC RBC AUTO-ENTMCNC: 34.5 G/DL (ref 32–36)
MCHC RBC AUTO-ENTMCNC: 34.6 G/DL (ref 32–36)
MCHC RBC AUTO-ENTMCNC: 34.7 G/DL (ref 32–36)
MCHC RBC AUTO-ENTMCNC: 35 G/DL (ref 32–36)
MCHC RBC AUTO-ENTMCNC: 35 G/DL (ref 32–36)
MCHC RBC AUTO-ENTMCNC: 35.2 G/DL (ref 32–36)
MCHC RBC AUTO-ENTMCNC: 35.4 G/DL (ref 32–36)
MCV RBC AUTO: 85 FL (ref 82–98)
MCV RBC AUTO: 86 FL (ref 82–98)
MCV RBC AUTO: 87 FL (ref 82–98)
MCV RBC AUTO: 88 FL (ref 82–98)
MCV RBC AUTO: 89 FL (ref 82–98)
MCV RBC AUTO: 90 FL (ref 82–98)
MCV RBC AUTO: 92 FL (ref 82–98)
METAMYELOCYTES NFR BLD MANUAL: 1 %
METAMYELOCYTES NFR BLD MANUAL: 3 %
METAMYELOCYTES NFR BLD MANUAL: 3 %
METAMYELOCYTES NFR BLD MANUAL: 5 %
METHADONE UR QL SCN>300 NG/ML: NEGATIVE
METHEMOGLOBIN: 0.5 % (ref 0–3)
METHEMOGLOBIN: 0.5 % (ref 0–3)
METHEMOGLOBIN: 1.2 % (ref 0–3)
MIN VOL: 14.9
MIN VOL: 15.7
MIN VOL: 17.1
MIN VOL: 17.4
MODE: ABNORMAL
MONOCYTES # BLD AUTO: 0.4 K/UL (ref 0.3–1)
MONOCYTES # BLD AUTO: 0.5 K/UL (ref 0.3–1)
MONOCYTES # BLD AUTO: 0.6 K/UL (ref 0.3–1)
MONOCYTES # BLD AUTO: 0.7 K/UL (ref 0.3–1)
MONOCYTES # BLD AUTO: 0.8 K/UL (ref 0.3–1)
MONOCYTES # BLD AUTO: 0.8 K/UL (ref 0.3–1)
MONOCYTES # BLD AUTO: 1 K/UL (ref 0.3–1)
MONOCYTES # BLD AUTO: 1.1 K/UL (ref 0.3–1)
MONOCYTES # BLD AUTO: 1.1 K/UL (ref 0.3–1)
MONOCYTES # BLD AUTO: 1.2 K/UL (ref 0.3–1)
MONOCYTES # BLD AUTO: 1.2 K/UL (ref 0.3–1)
MONOCYTES # BLD AUTO: 1.4 K/UL (ref 0.3–1)
MONOCYTES # BLD AUTO: 1.5 K/UL (ref 0.3–1)
MONOCYTES # BLD AUTO: ABNORMAL K/UL (ref 0.3–1)
MONOCYTES NFR BLD: 1 % (ref 4–15)
MONOCYTES NFR BLD: 1.9 % (ref 4–15)
MONOCYTES NFR BLD: 10 % (ref 4–15)
MONOCYTES NFR BLD: 2 % (ref 4–15)
MONOCYTES NFR BLD: 2.8 % (ref 4–15)
MONOCYTES NFR BLD: 2.9 % (ref 4–15)
MONOCYTES NFR BLD: 2.9 % (ref 4–15)
MONOCYTES NFR BLD: 3 % (ref 4–15)
MONOCYTES NFR BLD: 3.1 % (ref 4–15)
MONOCYTES NFR BLD: 3.4 % (ref 4–15)
MONOCYTES NFR BLD: 3.8 % (ref 4–15)
MONOCYTES NFR BLD: 3.8 % (ref 4–15)
MONOCYTES NFR BLD: 4 % (ref 4–15)
MONOCYTES NFR BLD: 4.3 % (ref 4–15)
MONOCYTES NFR BLD: 4.6 % (ref 4–15)
MONOCYTES NFR BLD: 4.8 % (ref 4–15)
MONOCYTES NFR BLD: 5 % (ref 4–15)
MONOCYTES NFR BLD: 5.1 % (ref 4–15)
MONOCYTES NFR BLD: 5.2 % (ref 4–15)
MONOCYTES NFR BLD: 6 % (ref 4–15)
MONOCYTES NFR BLD: 6.2 % (ref 4–15)
MONOCYTES NFR BLD: 6.4 % (ref 4–15)
MONOCYTES NFR BLD: 6.8 % (ref 4–15)
MONOCYTES NFR BLD: 7.6 % (ref 4–15)
MONOCYTES NFR BLD: 8 % (ref 4–15)
MONOCYTES NFR BLD: 8.2 % (ref 4–15)
MONOCYTES NFR BLD: 9 % (ref 4–15)
MV PEAK A VEL: 0.26 M/S
MV PEAK E VEL: 0.31 M/S
MYELOCYTES NFR BLD MANUAL: 1 %
MYELOCYTES NFR BLD MANUAL: 2 %
MYELOCYTES NFR BLD MANUAL: 4 %
MYELOCYTES NFR BLD MANUAL: 5 %
NEUTROPHILS # BLD AUTO: 11 K/UL (ref 1.8–7.7)
NEUTROPHILS # BLD AUTO: 11.4 K/UL (ref 1.8–7.7)
NEUTROPHILS # BLD AUTO: 11.6 K/UL (ref 1.8–7.7)
NEUTROPHILS # BLD AUTO: 11.7 K/UL (ref 1.8–7.7)
NEUTROPHILS # BLD AUTO: 11.9 K/UL (ref 1.8–7.7)
NEUTROPHILS # BLD AUTO: 12.4 K/UL (ref 1.8–7.7)
NEUTROPHILS # BLD AUTO: 12.7 K/UL (ref 1.8–7.7)
NEUTROPHILS # BLD AUTO: 12.8 K/UL (ref 1.8–7.7)
NEUTROPHILS # BLD AUTO: 12.9 K/UL (ref 1.8–7.7)
NEUTROPHILS # BLD AUTO: 13 K/UL (ref 1.8–7.7)
NEUTROPHILS # BLD AUTO: 13.4 K/UL (ref 1.8–7.7)
NEUTROPHILS # BLD AUTO: 13.6 K/UL (ref 1.8–7.7)
NEUTROPHILS # BLD AUTO: 13.8 K/UL (ref 1.8–7.7)
NEUTROPHILS # BLD AUTO: 13.8 K/UL (ref 1.8–7.7)
NEUTROPHILS # BLD AUTO: 14.1 K/UL (ref 1.8–7.7)
NEUTROPHILS # BLD AUTO: 14.8 K/UL (ref 1.8–7.7)
NEUTROPHILS # BLD AUTO: 16 K/UL (ref 1.8–7.7)
NEUTROPHILS # BLD AUTO: 16.1 K/UL (ref 1.8–7.7)
NEUTROPHILS # BLD AUTO: 16.8 K/UL (ref 1.8–7.7)
NEUTROPHILS # BLD AUTO: 17.6 K/UL (ref 1.8–7.7)
NEUTROPHILS # BLD AUTO: 18.7 K/UL (ref 1.8–7.7)
NEUTROPHILS # BLD AUTO: 20.3 K/UL (ref 1.8–7.7)
NEUTROPHILS # BLD AUTO: 21 K/UL (ref 1.8–7.7)
NEUTROPHILS NFR BLD: 67.2 % (ref 38–73)
NEUTROPHILS NFR BLD: 75 % (ref 38–73)
NEUTROPHILS NFR BLD: 77 % (ref 38–73)
NEUTROPHILS NFR BLD: 79.3 % (ref 38–73)
NEUTROPHILS NFR BLD: 80 % (ref 38–73)
NEUTROPHILS NFR BLD: 80.2 % (ref 38–73)
NEUTROPHILS NFR BLD: 81 % (ref 38–73)
NEUTROPHILS NFR BLD: 81.8 % (ref 38–73)
NEUTROPHILS NFR BLD: 82 % (ref 38–73)
NEUTROPHILS NFR BLD: 82 % (ref 38–73)
NEUTROPHILS NFR BLD: 82.5 % (ref 38–73)
NEUTROPHILS NFR BLD: 82.6 % (ref 38–73)
NEUTROPHILS NFR BLD: 83.4 % (ref 38–73)
NEUTROPHILS NFR BLD: 84 % (ref 38–73)
NEUTROPHILS NFR BLD: 84 % (ref 38–73)
NEUTROPHILS NFR BLD: 85 % (ref 38–73)
NEUTROPHILS NFR BLD: 85.1 % (ref 38–73)
NEUTROPHILS NFR BLD: 85.3 % (ref 38–73)
NEUTROPHILS NFR BLD: 85.6 % (ref 38–73)
NEUTROPHILS NFR BLD: 85.8 % (ref 38–73)
NEUTROPHILS NFR BLD: 86.8 % (ref 38–73)
NEUTROPHILS NFR BLD: 87 % (ref 38–73)
NEUTROPHILS NFR BLD: 87.1 % (ref 38–73)
NEUTROPHILS NFR BLD: 88.2 % (ref 38–73)
NEUTROPHILS NFR BLD: 88.2 % (ref 38–73)
NEUTROPHILS NFR BLD: 88.7 % (ref 38–73)
NEUTROPHILS NFR BLD: 89 % (ref 38–73)
NEUTROPHILS NFR BLD: 90 % (ref 38–73)
NEUTROPHILS NFR BLD: 90.6 % (ref 38–73)
NEUTROPHILS NFR BLD: 90.7 % (ref 38–73)
NEUTROPHILS NFR BLD: 91.3 % (ref 38–73)
NEUTROPHILS NFR BLD: 92 % (ref 38–73)
NEUTROPHILS NFR BLD: 92.5 % (ref 38–73)
NEUTROPHILS NFR BLD: 93.2 % (ref 38–73)
NEUTROPHILS NFR BLD: 94 % (ref 38–73)
NEUTS BAND NFR BLD MANUAL: 1 %
NEUTS BAND NFR BLD MANUAL: 10 %
NEUTS BAND NFR BLD MANUAL: 2 %
NEUTS BAND NFR BLD MANUAL: 3 %
NEUTS BAND NFR BLD MANUAL: 3 %
NEUTS BAND NFR BLD MANUAL: 4 %
NEUTS BAND NFR BLD MANUAL: 5 %
NONHDLC SERPL-MCNC: 169 MG/DL
NRBC BLD-RTO: 0 /100 WBC
NRBC BLD-RTO: 0 /100 WBC
NRBC BLD-RTO: 1 /100 WBC
NRBC BLD-RTO: 10 /100 WBC
NRBC BLD-RTO: 2 /100 WBC
NRBC BLD-RTO: 2 /100 WBC
NRBC BLD-RTO: 3 /100 WBC
NRBC BLD-RTO: 3 /100 WBC
NRBC BLD-RTO: 4 /100 WBC
NRBC BLD-RTO: 5 /100 WBC
NRBC BLD-RTO: 6 /100 WBC
NRBC BLD-RTO: 7 /100 WBC
NRBC BLD-RTO: 8 /100 WBC
NRBC BLD-RTO: 8 /100 WBC
NUM UNITS TRANS FFP: NORMAL
OPIATES UR QL SCN: NEGATIVE
OVALOCYTES BLD QL SMEAR: ABNORMAL
PCO2 BLDA: 24.9 MMHG (ref 35–45)
PCO2 BLDA: 25.3 MMHG (ref 35–45)
PCO2 BLDA: 26 MMHG (ref 35–45)
PCO2 BLDA: 29.2 MMHG (ref 35–45)
PCO2 BLDA: 30.6 MMHG (ref 35–45)
PCO2 BLDA: 31.1 MMHG (ref 35–45)
PCO2 BLDA: 31.6 MMHG (ref 35–45)
PCO2 BLDA: 31.7 MMHG (ref 35–45)
PCO2 BLDA: 32.2 MMHG (ref 35–45)
PCO2 BLDA: 33.5 MMHG (ref 35–45)
PCO2 BLDA: 35 MMHG (ref 35–45)
PCO2 BLDA: 35.1 MMHG (ref 35–45)
PCO2 BLDA: 35.2 MMHG (ref 35–45)
PCO2 BLDA: 35.5 MMHG (ref 35–45)
PCO2 BLDA: 35.6 MMHG (ref 35–45)
PCO2 BLDA: 35.6 MMHG (ref 35–45)
PCO2 BLDA: 35.7 MMHG (ref 35–45)
PCO2 BLDA: 35.7 MMHG (ref 35–45)
PCO2 BLDA: 35.8 MMHG (ref 35–45)
PCO2 BLDA: 35.8 MMHG (ref 35–45)
PCO2 BLDA: 35.9 MMHG (ref 35–45)
PCO2 BLDA: 36 MMHG (ref 35–45)
PCO2 BLDA: 36.2 MMHG (ref 35–45)
PCO2 BLDA: 36.3 MMHG (ref 35–45)
PCO2 BLDA: 36.7 MMHG (ref 35–45)
PCO2 BLDA: 37.2 MMHG (ref 35–45)
PCO2 BLDA: 37.5 MMHG (ref 35–45)
PCO2 BLDA: 37.6 MMHG (ref 35–45)
PCO2 BLDA: 37.8 MMHG (ref 35–45)
PCO2 BLDA: 38.1 MMHG (ref 35–45)
PCO2 BLDA: 38.4 MMHG (ref 35–45)
PCO2 BLDA: 38.7 MMHG (ref 35–45)
PCO2 BLDA: 38.8 MMHG (ref 35–45)
PCO2 BLDA: 38.8 MMHG (ref 35–45)
PCO2 BLDA: 38.9 MMHG (ref 35–45)
PCO2 BLDA: 39 MMHG (ref 35–45)
PCO2 BLDA: 39 MMHG (ref 35–45)
PCO2 BLDA: 39.1 MMHG (ref 35–45)
PCO2 BLDA: 39.2 MMHG (ref 35–45)
PCO2 BLDA: 39.3 MMHG (ref 35–45)
PCO2 BLDA: 39.4 MMHG (ref 35–45)
PCO2 BLDA: 39.5 MMHG (ref 35–45)
PCO2 BLDA: 39.5 MMHG (ref 35–45)
PCO2 BLDA: 39.6 MMHG (ref 35–45)
PCO2 BLDA: 39.7 MMHG (ref 35–45)
PCO2 BLDA: 40.1 MMHG (ref 35–45)
PCO2 BLDA: 40.1 MMHG (ref 35–45)
PCO2 BLDA: 40.2 MMHG (ref 35–45)
PCO2 BLDA: 40.2 MMHG (ref 35–45)
PCO2 BLDA: 40.3 MMHG (ref 35–45)
PCO2 BLDA: 40.4 MMHG (ref 35–45)
PCO2 BLDA: 40.5 MMHG (ref 35–45)
PCO2 BLDA: 40.5 MMHG (ref 35–45)
PCO2 BLDA: 40.6 MMHG (ref 35–45)
PCO2 BLDA: 40.7 MMHG (ref 35–45)
PCO2 BLDA: 40.8 MMHG (ref 35–45)
PCO2 BLDA: 40.9 MMHG (ref 35–45)
PCO2 BLDA: 41 MMHG (ref 35–45)
PCO2 BLDA: 41.1 MMHG (ref 35–45)
PCO2 BLDA: 41.2 MMHG (ref 35–45)
PCO2 BLDA: 41.2 MMHG (ref 35–45)
PCO2 BLDA: 41.4 MMHG (ref 35–45)
PCO2 BLDA: 41.5 MMHG (ref 35–45)
PCO2 BLDA: 41.6 MMHG (ref 35–45)
PCO2 BLDA: 41.7 MMHG (ref 35–45)
PCO2 BLDA: 41.8 MMHG (ref 35–45)
PCO2 BLDA: 41.9 MMHG (ref 35–45)
PCO2 BLDA: 41.9 MMHG (ref 35–45)
PCO2 BLDA: 42 MMHG (ref 35–45)
PCO2 BLDA: 42.1 MMHG (ref 35–45)
PCO2 BLDA: 42.2 MMHG (ref 35–45)
PCO2 BLDA: 42.2 MMHG (ref 35–45)
PCO2 BLDA: 42.3 MMHG (ref 35–45)
PCO2 BLDA: 42.4 MMHG (ref 35–45)
PCO2 BLDA: 42.4 MMHG (ref 35–45)
PCO2 BLDA: 42.5 MMHG (ref 35–45)
PCO2 BLDA: 42.5 MMHG (ref 35–45)
PCO2 BLDA: 42.6 MMHG (ref 35–45)
PCO2 BLDA: 42.6 MMHG (ref 35–45)
PCO2 BLDA: 42.7 MMHG (ref 35–45)
PCO2 BLDA: 42.7 MMHG (ref 35–45)
PCO2 BLDA: 42.8 MMHG (ref 35–45)
PCO2 BLDA: 42.9 MMHG (ref 35–45)
PCO2 BLDA: 43 MMHG (ref 35–45)
PCO2 BLDA: 43 MMHG (ref 35–45)
PCO2 BLDA: 43.1 MMHG (ref 35–45)
PCO2 BLDA: 43.2 MMHG (ref 35–45)
PCO2 BLDA: 43.2 MMHG (ref 35–45)
PCO2 BLDA: 43.3 MMHG (ref 35–45)
PCO2 BLDA: 43.4 MMHG (ref 35–45)
PCO2 BLDA: 43.4 MMHG (ref 35–45)
PCO2 BLDA: 43.5 MMHG (ref 35–45)
PCO2 BLDA: 43.7 MMHG (ref 35–45)
PCO2 BLDA: 43.7 MMHG (ref 35–45)
PCO2 BLDA: 43.8 MMHG (ref 35–45)
PCO2 BLDA: 43.8 MMHG (ref 35–45)
PCO2 BLDA: 43.9 MMHG (ref 35–45)
PCO2 BLDA: 44 MMHG (ref 35–45)
PCO2 BLDA: 44.3 MMHG (ref 35–45)
PCO2 BLDA: 45 MMHG (ref 35–45)
PCO2 BLDA: 45.1 MMHG (ref 35–45)
PCO2 BLDA: 45.2 MMHG (ref 35–45)
PCO2 BLDA: 45.8 MMHG (ref 35–45)
PCO2 BLDA: 46.6 MMHG (ref 35–45)
PCO2 BLDA: 46.9 MMHG (ref 35–45)
PCO2 BLDA: 47 MMHG (ref 35–45)
PCO2 BLDA: 47.5 MMHG (ref 35–45)
PCO2 BLDA: 48.1 MMHG (ref 35–45)
PCO2 BLDA: 48.6 MMHG (ref 35–45)
PCO2 BLDA: 49 MMHG (ref 35–45)
PCO2 BLDA: 49.7 MMHG (ref 35–45)
PCO2 BLDA: 50.8 MMHG (ref 35–45)
PCO2 BLDA: 50.9 MMHG (ref 35–45)
PCO2 BLDA: 54.1 MMHG (ref 35–45)
PCO2 BLDA: 56.1 MMHG (ref 35–45)
PCO2 BLDA: 66 MMHG (ref 35–45)
PCO2 BLDA: 69 MMHG (ref 35–45)
PCO2 BLDA: 72.5 MMHG (ref 35–45)
PCP UR QL SCN>25 NG/ML: NEGATIVE
PEEP: 10
PEEP: 15
PEEP: 15
PEEP: 5
PEEP: 7.5
PEEP: 7.5
PH SMN: 7.08 [PH] (ref 7.35–7.45)
PH SMN: 7.09 [PH] (ref 7.35–7.45)
PH SMN: 7.1 [PH] (ref 7.35–7.45)
PH SMN: 7.1 [PH] (ref 7.35–7.45)
PH SMN: 7.11 [PH] (ref 7.35–7.45)
PH SMN: 7.11 [PH] (ref 7.35–7.45)
PH SMN: 7.12 [PH] (ref 7.35–7.45)
PH SMN: 7.13 [PH] (ref 7.35–7.45)
PH SMN: 7.15 [PH] (ref 7.35–7.45)
PH SMN: 7.18 [PH] (ref 7.35–7.45)
PH SMN: 7.19 [PH] (ref 7.35–7.45)
PH SMN: 7.22 [PH] (ref 7.35–7.45)
PH SMN: 7.22 [PH] (ref 7.35–7.45)
PH SMN: 7.23 [PH] (ref 7.35–7.45)
PH SMN: 7.25 [PH] (ref 7.35–7.45)
PH SMN: 7.25 [PH] (ref 7.35–7.45)
PH SMN: 7.26 [PH] (ref 7.35–7.45)
PH SMN: 7.27 [PH] (ref 7.35–7.45)
PH SMN: 7.27 [PH] (ref 7.35–7.45)
PH SMN: 7.31 [PH] (ref 7.35–7.45)
PH SMN: 7.31 [PH] (ref 7.35–7.45)
PH SMN: 7.32 [PH] (ref 7.35–7.45)
PH SMN: 7.33 [PH] (ref 7.35–7.45)
PH SMN: 7.34 [PH] (ref 7.35–7.45)
PH SMN: 7.35 [PH] (ref 7.35–7.45)
PH SMN: 7.36 [PH] (ref 7.35–7.45)
PH SMN: 7.36 [PH] (ref 7.35–7.45)
PH SMN: 7.37 [PH] (ref 7.35–7.45)
PH SMN: 7.38 [PH] (ref 7.35–7.45)
PH SMN: 7.39 [PH] (ref 7.35–7.45)
PH SMN: 7.4 [PH] (ref 7.35–7.45)
PH SMN: 7.41 [PH] (ref 7.35–7.45)
PH SMN: 7.42 [PH] (ref 7.35–7.45)
PH SMN: 7.43 [PH] (ref 7.35–7.45)
PH SMN: 7.44 [PH] (ref 7.35–7.45)
PH SMN: 7.45 [PH] (ref 7.35–7.45)
PH SMN: 7.46 [PH] (ref 7.35–7.45)
PH SMN: 7.53 [PH] (ref 7.35–7.45)
PH SMN: 7.53 [PH] (ref 7.35–7.45)
PHOSPHATE SERPL-MCNC: 1.1 MG/DL (ref 2.7–4.5)
PHOSPHATE SERPL-MCNC: 1.1 MG/DL (ref 2.7–4.5)
PHOSPHATE SERPL-MCNC: 1.6 MG/DL (ref 2.7–4.5)
PHOSPHATE SERPL-MCNC: 1.7 MG/DL (ref 2.7–4.5)
PHOSPHATE SERPL-MCNC: 1.8 MG/DL (ref 2.7–4.5)
PHOSPHATE SERPL-MCNC: 2 MG/DL (ref 2.7–4.5)
PHOSPHATE SERPL-MCNC: 2.1 MG/DL (ref 2.7–4.5)
PHOSPHATE SERPL-MCNC: 2.2 MG/DL (ref 2.7–4.5)
PHOSPHATE SERPL-MCNC: 2.3 MG/DL (ref 2.7–4.5)
PHOSPHATE SERPL-MCNC: 2.4 MG/DL (ref 2.7–4.5)
PHOSPHATE SERPL-MCNC: 2.5 MG/DL (ref 2.7–4.5)
PHOSPHATE SERPL-MCNC: 2.6 MG/DL (ref 2.7–4.5)
PHOSPHATE SERPL-MCNC: 2.6 MG/DL (ref 2.7–4.5)
PHOSPHATE SERPL-MCNC: 2.7 MG/DL (ref 2.7–4.5)
PHOSPHATE SERPL-MCNC: 2.8 MG/DL (ref 2.7–4.5)
PHOSPHATE SERPL-MCNC: 2.8 MG/DL (ref 2.7–4.5)
PHOSPHATE SERPL-MCNC: 2.9 MG/DL (ref 2.7–4.5)
PHOSPHATE SERPL-MCNC: 3.2 MG/DL (ref 2.7–4.5)
PHOSPHATE SERPL-MCNC: 3.4 MG/DL (ref 2.7–4.5)
PHOSPHATE SERPL-MCNC: 3.5 MG/DL (ref 2.7–4.5)
PHOSPHATE SERPL-MCNC: 3.6 MG/DL (ref 2.7–4.5)
PHOSPHATE SERPL-MCNC: 4 MG/DL (ref 2.7–4.5)
PHOSPHATE SERPL-MCNC: 4.1 MG/DL (ref 2.7–4.5)
PHOSPHATE SERPL-MCNC: 4.6 MG/DL (ref 2.7–4.5)
PHOSPHATE SERPL-MCNC: 4.6 MG/DL (ref 2.7–4.5)
PHOSPHATE SERPL-MCNC: 5.9 MG/DL (ref 2.7–4.5)
PHOSPHATE SERPL-MCNC: 6.3 MG/DL (ref 2.7–4.5)
PHOSPHATE SERPL-MCNC: 6.7 MG/DL (ref 2.7–4.5)
PHOSPHATE SERPL-MCNC: 7.4 MG/DL (ref 2.7–4.5)
PHOSPHATE SERPL-MCNC: 8.2 MG/DL (ref 2.7–4.5)
PHOSPHATE SERPL-MCNC: 8.5 MG/DL (ref 2.7–4.5)
PIP: 3
PIP: 31
PIP: 33
PIP: 36
PLATELET # BLD AUTO: 141 K/UL (ref 150–350)
PLATELET # BLD AUTO: 172 K/UL (ref 150–350)
PLATELET # BLD AUTO: 218 K/UL (ref 150–350)
PLATELET # BLD AUTO: 29 K/UL (ref 150–350)
PLATELET # BLD AUTO: 30 K/UL (ref 150–350)
PLATELET # BLD AUTO: 322 K/UL (ref 150–350)
PLATELET # BLD AUTO: 374 K/UL (ref 150–350)
PLATELET # BLD AUTO: 378 K/UL (ref 150–350)
PLATELET # BLD AUTO: 38 K/UL (ref 150–350)
PLATELET # BLD AUTO: 41 K/UL (ref 150–350)
PLATELET # BLD AUTO: 43 K/UL (ref 150–350)
PLATELET # BLD AUTO: 43 K/UL (ref 150–350)
PLATELET # BLD AUTO: 44 K/UL (ref 150–350)
PLATELET # BLD AUTO: 45 K/UL (ref 150–350)
PLATELET # BLD AUTO: 47 K/UL (ref 150–350)
PLATELET # BLD AUTO: 48 K/UL (ref 150–350)
PLATELET # BLD AUTO: 49 K/UL (ref 150–350)
PLATELET # BLD AUTO: 49 K/UL (ref 150–350)
PLATELET # BLD AUTO: 51 K/UL (ref 150–350)
PLATELET # BLD AUTO: 52 K/UL (ref 150–350)
PLATELET # BLD AUTO: 52 K/UL (ref 150–350)
PLATELET # BLD AUTO: 55 K/UL (ref 150–350)
PLATELET # BLD AUTO: 56 K/UL (ref 150–350)
PLATELET # BLD AUTO: 59 K/UL (ref 150–350)
PLATELET # BLD AUTO: 59 K/UL (ref 150–350)
PLATELET # BLD AUTO: 60 K/UL (ref 150–350)
PLATELET # BLD AUTO: 61 K/UL (ref 150–350)
PLATELET # BLD AUTO: 68 K/UL (ref 150–350)
PLATELET # BLD AUTO: 72 K/UL (ref 150–350)
PLATELET # BLD AUTO: 75 K/UL (ref 150–350)
PLATELET # BLD AUTO: 87 K/UL (ref 150–350)
PLATELET # BLD AUTO: 90 K/UL (ref 150–350)
PLATELET # BLD AUTO: 96 K/UL (ref 150–350)
PLATELET BLD QL SMEAR: ABNORMAL
PMV BLD AUTO: 10 FL (ref 9.2–12.9)
PMV BLD AUTO: 10.1 FL (ref 9.2–12.9)
PMV BLD AUTO: 10.1 FL (ref 9.2–12.9)
PMV BLD AUTO: 10.2 FL (ref 9.2–12.9)
PMV BLD AUTO: 10.3 FL (ref 9.2–12.9)
PMV BLD AUTO: 10.5 FL (ref 9.2–12.9)
PMV BLD AUTO: 10.5 FL (ref 9.2–12.9)
PMV BLD AUTO: 10.6 FL (ref 9.2–12.9)
PMV BLD AUTO: 10.8 FL (ref 9.2–12.9)
PMV BLD AUTO: 11 FL (ref 9.2–12.9)
PMV BLD AUTO: 11 FL (ref 9.2–12.9)
PMV BLD AUTO: 11.3 FL (ref 9.2–12.9)
PMV BLD AUTO: 11.4 FL (ref 9.2–12.9)
PMV BLD AUTO: 11.4 FL (ref 9.2–12.9)
PMV BLD AUTO: 11.5 FL (ref 9.2–12.9)
PMV BLD AUTO: 11.6 FL (ref 9.2–12.9)
PMV BLD AUTO: 11.7 FL (ref 9.2–12.9)
PMV BLD AUTO: 11.7 FL (ref 9.2–12.9)
PMV BLD AUTO: 11.9 FL (ref 9.2–12.9)
PMV BLD AUTO: 12 FL (ref 9.2–12.9)
PMV BLD AUTO: 12.2 FL (ref 9.2–12.9)
PMV BLD AUTO: 12.5 FL (ref 9.2–12.9)
PMV BLD AUTO: 9.2 FL (ref 9.2–12.9)
PMV BLD AUTO: 9.7 FL (ref 9.2–12.9)
PO2 BLDA: 117 MMHG (ref 80–100)
PO2 BLDA: 118 MMHG (ref 80–100)
PO2 BLDA: 134 MMHG (ref 80–100)
PO2 BLDA: 135 MMHG (ref 80–100)
PO2 BLDA: 137 MMHG (ref 80–100)
PO2 BLDA: 145 MMHG (ref 80–100)
PO2 BLDA: 159 MMHG (ref 80–100)
PO2 BLDA: 166 MMHG (ref 80–100)
PO2 BLDA: 168 MMHG (ref 80–100)
PO2 BLDA: 169 MMHG (ref 80–100)
PO2 BLDA: 169 MMHG (ref 80–100)
PO2 BLDA: 180 MMHG (ref 80–100)
PO2 BLDA: 183 MMHG (ref 80–100)
PO2 BLDA: 185 MMHG (ref 80–100)
PO2 BLDA: 213 MMHG (ref 80–100)
PO2 BLDA: 216 MMHG (ref 80–100)
PO2 BLDA: 220 MMHG (ref 80–100)
PO2 BLDA: 227 MMHG (ref 80–100)
PO2 BLDA: 228 MMHG (ref 80–100)
PO2 BLDA: 231 MMHG (ref 80–100)
PO2 BLDA: 237 MMHG (ref 80–100)
PO2 BLDA: 239 MMHG (ref 80–100)
PO2 BLDA: 251 MMHG (ref 80–100)
PO2 BLDA: 276 MMHG (ref 80–100)
PO2 BLDA: 293 MMHG (ref 80–100)
PO2 BLDA: 300 MMHG (ref 80–100)
PO2 BLDA: 302 MMHG (ref 80–100)
PO2 BLDA: 31 MMHG (ref 40–60)
PO2 BLDA: 32 MMHG (ref 40–60)
PO2 BLDA: 329 MMHG (ref 80–100)
PO2 BLDA: 330 MMHG (ref 80–100)
PO2 BLDA: 34 MMHG (ref 40–60)
PO2 BLDA: 34 MMHG (ref 40–60)
PO2 BLDA: 347 MMHG (ref 80–100)
PO2 BLDA: 347 MMHG (ref 80–100)
PO2 BLDA: 357 MMHG (ref 80–100)
PO2 BLDA: 363 MMHG (ref 80–100)
PO2 BLDA: 364 MMHG (ref 80–100)
PO2 BLDA: 37 MMHG (ref 40–60)
PO2 BLDA: 37 MMHG (ref 40–60)
PO2 BLDA: 370 MMHG (ref 80–100)
PO2 BLDA: 376 MMHG (ref 80–100)
PO2 BLDA: 377 MMHG (ref 80–100)
PO2 BLDA: 378 MMHG (ref 80–100)
PO2 BLDA: 378 MMHG (ref 80–100)
PO2 BLDA: 381 MMHG (ref 80–100)
PO2 BLDA: 382 MMHG (ref 80–100)
PO2 BLDA: 384 MMHG (ref 80–100)
PO2 BLDA: 384 MMHG (ref 80–100)
PO2 BLDA: 388 MMHG (ref 80–100)
PO2 BLDA: 39 MMHG (ref 40–60)
PO2 BLDA: 39 MMHG (ref 40–60)
PO2 BLDA: 390 MMHG (ref 80–100)
PO2 BLDA: 397 MMHG (ref 80–100)
PO2 BLDA: 398 MMHG (ref 80–100)
PO2 BLDA: 40 MMHG (ref 40–60)
PO2 BLDA: 400 MMHG (ref 80–100)
PO2 BLDA: 400 MMHG (ref 80–100)
PO2 BLDA: 402 MMHG (ref 80–100)
PO2 BLDA: 402 MMHG (ref 80–100)
PO2 BLDA: 403 MMHG (ref 80–100)
PO2 BLDA: 408 MMHG (ref 80–100)
PO2 BLDA: 41 MMHG (ref 40–60)
PO2 BLDA: 411 MMHG (ref 80–100)
PO2 BLDA: 414 MMHG (ref 80–100)
PO2 BLDA: 415 MMHG (ref 80–100)
PO2 BLDA: 416 MMHG (ref 80–100)
PO2 BLDA: 418 MMHG (ref 80–100)
PO2 BLDA: 419 MMHG (ref 80–100)
PO2 BLDA: 419 MMHG (ref 80–100)
PO2 BLDA: 420 MMHG (ref 80–100)
PO2 BLDA: 421 MMHG (ref 80–100)
PO2 BLDA: 422 MMHG (ref 80–100)
PO2 BLDA: 423 MMHG (ref 80–100)
PO2 BLDA: 424 MMHG (ref 80–100)
PO2 BLDA: 424 MMHG (ref 80–100)
PO2 BLDA: 425 MMHG (ref 80–100)
PO2 BLDA: 427 MMHG (ref 80–100)
PO2 BLDA: 428 MMHG (ref 80–100)
PO2 BLDA: 429 MMHG (ref 80–100)
PO2 BLDA: 43 MMHG (ref 40–60)
PO2 BLDA: 430 MMHG (ref 80–100)
PO2 BLDA: 431 MMHG (ref 80–100)
PO2 BLDA: 432 MMHG (ref 80–100)
PO2 BLDA: 432 MMHG (ref 80–100)
PO2 BLDA: 434 MMHG (ref 80–100)
PO2 BLDA: 435 MMHG (ref 80–100)
PO2 BLDA: 436 MMHG (ref 80–100)
PO2 BLDA: 436 MMHG (ref 80–100)
PO2 BLDA: 437 MMHG (ref 80–100)
PO2 BLDA: 438 MMHG (ref 80–100)
PO2 BLDA: 438 MMHG (ref 80–100)
PO2 BLDA: 439 MMHG (ref 80–100)
PO2 BLDA: 44 MMHG (ref 40–60)
PO2 BLDA: 441 MMHG (ref 80–100)
PO2 BLDA: 441 MMHG (ref 80–100)
PO2 BLDA: 442 MMHG (ref 80–100)
PO2 BLDA: 442 MMHG (ref 80–100)
PO2 BLDA: 447 MMHG (ref 80–100)
PO2 BLDA: 448 MMHG (ref 80–100)
PO2 BLDA: 449 MMHG (ref 80–100)
PO2 BLDA: 45 MMHG (ref 40–60)
PO2 BLDA: 451 MMHG (ref 80–100)
PO2 BLDA: 453 MMHG (ref 80–100)
PO2 BLDA: 458 MMHG (ref 80–100)
PO2 BLDA: 459 MMHG (ref 80–100)
PO2 BLDA: 460 MMHG (ref 80–100)
PO2 BLDA: 462 MMHG (ref 80–100)
PO2 BLDA: 462 MMHG (ref 80–100)
PO2 BLDA: 464 MMHG (ref 80–100)
PO2 BLDA: 466 MMHG (ref 80–100)
PO2 BLDA: 469 MMHG (ref 80–100)
PO2 BLDA: 470 MMHG (ref 80–100)
PO2 BLDA: 473 MMHG (ref 80–100)
PO2 BLDA: 473 MMHG (ref 80–100)
PO2 BLDA: 48 MMHG (ref 40–60)
PO2 BLDA: 500 MMHG (ref 80–100)
PO2 BLDA: 58 MMHG (ref 80–100)
PO2 BLDA: 70 MMHG (ref 80–100)
PO2 BLDA: 72 MMHG (ref 80–100)
PO2 BLDA: 74 MMHG (ref 80–100)
PO2 BLDA: 81 MMHG (ref 80–100)
PO2 BLDA: 85 MMHG (ref 80–100)
PO2 BLDA: 86 MMHG (ref 80–100)
PO2 BLDA: 87 MMHG (ref 80–100)
PO2 BLDA: 96 MMHG (ref 80–100)
POC ACTIVATED CLOTTING TIME K: 169 SEC (ref 74–137)
POC ACTIVATED CLOTTING TIME K: 175 SEC (ref 74–137)
POC ACTIVATED CLOTTING TIME K: 191 SEC (ref 74–137)
POC ACTIVATED CLOTTING TIME K: 208 SEC (ref 74–137)
POC BE: -1 MMOL/L
POC BE: -10 MMOL/L
POC BE: -11 MMOL/L
POC BE: -12 MMOL/L
POC BE: -13 MMOL/L
POC BE: -14 MMOL/L
POC BE: -15 MMOL/L
POC BE: -15 MMOL/L
POC BE: -18 MMOL/L
POC BE: -2 MMOL/L
POC BE: -3 MMOL/L
POC BE: -4 MMOL/L
POC BE: -5 MMOL/L
POC BE: -5 MMOL/L
POC BE: -6 MMOL/L
POC BE: -7 MMOL/L
POC BE: -8 MMOL/L
POC BE: -8 MMOL/L
POC BE: -9 MMOL/L
POC BE: -9 MMOL/L
POC BE: 0 MMOL/L
POC BE: 1 MMOL/L
POC BE: 2 MMOL/L
POC BE: 3 MMOL/L
POC BE: 4 MMOL/L
POC BE: 5 MMOL/L
POC BE: 6 MMOL/L
POC BE: 7 MMOL/L
POC IONIZED CALCIUM: 0.79 MMOL/L (ref 1.06–1.42)
POC IONIZED CALCIUM: 0.8 MMOL/L (ref 1.06–1.42)
POC IONIZED CALCIUM: 0.81 MMOL/L (ref 1.06–1.42)
POC IONIZED CALCIUM: 0.86 MMOL/L (ref 1.06–1.42)
POC IONIZED CALCIUM: 0.86 MMOL/L (ref 1.06–1.42)
POC IONIZED CALCIUM: 0.88 MMOL/L (ref 1.06–1.42)
POC IONIZED CALCIUM: 0.9 MMOL/L (ref 1.06–1.42)
POC IONIZED CALCIUM: 0.91 MMOL/L (ref 1.06–1.42)
POC IONIZED CALCIUM: 0.92 MMOL/L (ref 1.06–1.42)
POC IONIZED CALCIUM: 0.94 MMOL/L (ref 1.06–1.42)
POC IONIZED CALCIUM: 0.94 MMOL/L (ref 1.06–1.42)
POC IONIZED CALCIUM: 0.95 MMOL/L (ref 1.06–1.42)
POC IONIZED CALCIUM: 0.95 MMOL/L (ref 1.06–1.42)
POC IONIZED CALCIUM: 0.96 MMOL/L (ref 1.06–1.42)
POC IONIZED CALCIUM: 0.99 MMOL/L (ref 1.06–1.42)
POC IONIZED CALCIUM: 1.03 MMOL/L (ref 1.06–1.42)
POC IONIZED CALCIUM: 1.05 MMOL/L (ref 1.06–1.42)
POC IONIZED CALCIUM: 1.06 MMOL/L (ref 1.06–1.42)
POC IONIZED CALCIUM: 1.1 MMOL/L (ref 1.06–1.42)
POC IONIZED CALCIUM: 1.17 MMOL/L (ref 1.06–1.42)
POC IONIZED CALCIUM: 1.18 MMOL/L (ref 1.06–1.42)
POC IONIZED CALCIUM: 1.19 MMOL/L (ref 1.06–1.42)
POC IONIZED CALCIUM: 1.19 MMOL/L (ref 1.06–1.42)
POC IONIZED CALCIUM: 1.2 MMOL/L (ref 1.06–1.42)
POC IONIZED CALCIUM: 1.21 MMOL/L (ref 1.06–1.42)
POC IONIZED CALCIUM: 1.22 MMOL/L (ref 1.06–1.42)
POC IONIZED CALCIUM: 1.22 MMOL/L (ref 1.06–1.42)
POC IONIZED CALCIUM: 1.23 MMOL/L (ref 1.06–1.42)
POC IONIZED CALCIUM: 1.24 MMOL/L (ref 1.06–1.42)
POC IONIZED CALCIUM: 1.27 MMOL/L (ref 1.06–1.42)
POC IONIZED CALCIUM: 1.29 MMOL/L (ref 1.06–1.42)
POC IONIZED CALCIUM: 1.29 MMOL/L (ref 1.06–1.42)
POC IONIZED CALCIUM: 1.31 MMOL/L (ref 1.06–1.42)
POC IONIZED CALCIUM: 1.32 MMOL/L (ref 1.06–1.42)
POC IONIZED CALCIUM: 1.33 MMOL/L (ref 1.06–1.42)
POC IONIZED CALCIUM: 1.35 MMOL/L (ref 1.06–1.42)
POC IONIZED CALCIUM: 1.36 MMOL/L (ref 1.06–1.42)
POC IONIZED CALCIUM: 1.38 MMOL/L (ref 1.06–1.42)
POC IONIZED CALCIUM: 1.39 MMOL/L (ref 1.06–1.42)
POC IONIZED CALCIUM: 1.39 MMOL/L (ref 1.06–1.42)
POC IONIZED CALCIUM: 1.4 MMOL/L (ref 1.06–1.42)
POC IONIZED CALCIUM: 1.4 MMOL/L (ref 1.06–1.42)
POC IONIZED CALCIUM: 1.41 MMOL/L (ref 1.06–1.42)
POC IONIZED CALCIUM: 1.42 MMOL/L (ref 1.06–1.42)
POC IONIZED CALCIUM: 1.44 MMOL/L (ref 1.06–1.42)
POC IONIZED CALCIUM: 1.45 MMOL/L (ref 1.06–1.42)
POC IONIZED CALCIUM: 1.47 MMOL/L (ref 1.06–1.42)
POC IONIZED CALCIUM: 1.53 MMOL/L (ref 1.06–1.42)
POC IONIZED CALCIUM: 1.55 MMOL/L (ref 1.06–1.42)
POC IONIZED CALCIUM: 1.58 MMOL/L (ref 1.06–1.42)
POC IONIZED CALCIUM: 1.64 MMOL/L (ref 1.06–1.42)
POC IONIZED CALCIUM: 1.75 MMOL/L (ref 1.06–1.42)
POC IONIZED CALCIUM: 1.96 MMOL/L (ref 1.06–1.42)
POC SATURATED O2: 100 % (ref 95–100)
POC SATURATED O2: 50 % (ref 95–100)
POC SATURATED O2: 52 % (ref 95–100)
POC SATURATED O2: 59 % (ref 95–100)
POC SATURATED O2: 61 % (ref 95–100)
POC SATURATED O2: 61 % (ref 95–100)
POC SATURATED O2: 65 % (ref 95–100)
POC SATURATED O2: 66 % (ref 95–100)
POC SATURATED O2: 70 % (ref 95–100)
POC SATURATED O2: 73 % (ref 95–100)
POC SATURATED O2: 73 % (ref 95–100)
POC SATURATED O2: 74 % (ref 95–100)
POC SATURATED O2: 78 % (ref 95–100)
POC SATURATED O2: 79 % (ref 95–100)
POC SATURATED O2: 80 % (ref 95–100)
POC SATURATED O2: 80 % (ref 95–100)
POC SATURATED O2: 88 % (ref 95–100)
POC SATURATED O2: 90 % (ref 95–100)
POC SATURATED O2: 92 % (ref 95–100)
POC SATURATED O2: 96 % (ref 95–100)
POC SATURATED O2: 97 % (ref 95–100)
POC SATURATED O2: 98 % (ref 95–100)
POC SATURATED O2: 98 % (ref 95–100)
POC SATURATED O2: 99 % (ref 95–100)
POC TCO2: 12 MMOL/L (ref 23–27)
POC TCO2: 14 MMOL/L (ref 23–27)
POC TCO2: 15 MMOL/L (ref 23–27)
POC TCO2: 16 MMOL/L (ref 23–27)
POC TCO2: 16 MMOL/L (ref 23–27)
POC TCO2: 17 MMOL/L (ref 23–27)
POC TCO2: 18 MMOL/L (ref 23–27)
POC TCO2: 18 MMOL/L (ref 23–27)
POC TCO2: 19 MMOL/L (ref 23–27)
POC TCO2: 19 MMOL/L (ref 24–29)
POC TCO2: 20 MMOL/L (ref 23–27)
POC TCO2: 21 MMOL/L (ref 23–27)
POC TCO2: 21 MMOL/L (ref 24–29)
POC TCO2: 22 MMOL/L (ref 23–27)
POC TCO2: 23 MMOL/L (ref 23–27)
POC TCO2: 23 MMOL/L (ref 24–29)
POC TCO2: 24 MMOL/L (ref 23–27)
POC TCO2: 24 MMOL/L (ref 24–29)
POC TCO2: 24 MMOL/L (ref 24–29)
POC TCO2: 25 MMOL/L (ref 23–27)
POC TCO2: 26 MMOL/L (ref 23–27)
POC TCO2: 26 MMOL/L (ref 24–29)
POC TCO2: 27 MMOL/L (ref 23–27)
POC TCO2: 27 MMOL/L (ref 24–29)
POC TCO2: 27 MMOL/L (ref 24–29)
POC TCO2: 28 MMOL/L (ref 23–27)
POC TCO2: 28 MMOL/L (ref 24–29)
POC TCO2: 29 MMOL/L (ref 23–27)
POC TCO2: 29 MMOL/L (ref 24–29)
POC TCO2: 29 MMOL/L (ref 24–29)
POC TCO2: 30 MMOL/L (ref 23–27)
POC TCO2: 31 MMOL/L (ref 23–27)
POC TCO2: 32 MMOL/L (ref 23–27)
POC TCO2: 32 MMOL/L (ref 23–27)
POCT GLUCOSE: 100 MG/DL (ref 70–110)
POCT GLUCOSE: 100 MG/DL (ref 70–110)
POCT GLUCOSE: 101 MG/DL (ref 70–110)
POCT GLUCOSE: 101 MG/DL (ref 70–110)
POCT GLUCOSE: 102 MG/DL (ref 70–110)
POCT GLUCOSE: 103 MG/DL (ref 70–110)
POCT GLUCOSE: 104 MG/DL (ref 70–110)
POCT GLUCOSE: 105 MG/DL (ref 70–110)
POCT GLUCOSE: 105 MG/DL (ref 70–110)
POCT GLUCOSE: 106 MG/DL (ref 70–110)
POCT GLUCOSE: 107 MG/DL (ref 70–110)
POCT GLUCOSE: 107 MG/DL (ref 70–110)
POCT GLUCOSE: 110 MG/DL (ref 70–110)
POCT GLUCOSE: 111 MG/DL (ref 70–110)
POCT GLUCOSE: 112 MG/DL (ref 70–110)
POCT GLUCOSE: 114 MG/DL (ref 70–110)
POCT GLUCOSE: 116 MG/DL (ref 70–110)
POCT GLUCOSE: 119 MG/DL (ref 70–110)
POCT GLUCOSE: 119 MG/DL (ref 70–110)
POCT GLUCOSE: 120 MG/DL (ref 70–110)
POCT GLUCOSE: 122 MG/DL (ref 70–110)
POCT GLUCOSE: 124 MG/DL (ref 70–110)
POCT GLUCOSE: 126 MG/DL (ref 70–110)
POCT GLUCOSE: 126 MG/DL (ref 70–110)
POCT GLUCOSE: 127 MG/DL (ref 70–110)
POCT GLUCOSE: 127 MG/DL (ref 70–110)
POCT GLUCOSE: 128 MG/DL (ref 70–110)
POCT GLUCOSE: 128 MG/DL (ref 70–110)
POCT GLUCOSE: 133 MG/DL (ref 70–110)
POCT GLUCOSE: 139 MG/DL (ref 70–110)
POCT GLUCOSE: 139 MG/DL (ref 70–110)
POCT GLUCOSE: 150 MG/DL (ref 70–110)
POCT GLUCOSE: 180 MG/DL (ref 70–110)
POCT GLUCOSE: 83 MG/DL (ref 70–110)
POCT GLUCOSE: 90 MG/DL (ref 70–110)
POCT GLUCOSE: 90 MG/DL (ref 70–110)
POCT GLUCOSE: 91 MG/DL (ref 70–110)
POCT GLUCOSE: 94 MG/DL (ref 70–110)
POCT GLUCOSE: 95 MG/DL (ref 70–110)
POCT GLUCOSE: 96 MG/DL (ref 70–110)
POCT GLUCOSE: 97 MG/DL (ref 70–110)
POCT GLUCOSE: 97 MG/DL (ref 70–110)
POIKILOCYTOSIS BLD QL SMEAR: SLIGHT
POLYCHROMASIA BLD QL SMEAR: ABNORMAL
POTASSIUM BLD-SCNC: 2.5 MMOL/L (ref 3.5–5.1)
POTASSIUM BLD-SCNC: 3 MMOL/L (ref 3.5–5.1)
POTASSIUM BLD-SCNC: 3.4 MMOL/L (ref 3.5–5.1)
POTASSIUM BLD-SCNC: 3.5 MMOL/L (ref 3.5–5.1)
POTASSIUM BLD-SCNC: 3.6 MMOL/L (ref 3.5–5.1)
POTASSIUM BLD-SCNC: 3.6 MMOL/L (ref 3.5–5.1)
POTASSIUM BLD-SCNC: 3.7 MMOL/L (ref 3.5–5.1)
POTASSIUM BLD-SCNC: 3.8 MMOL/L (ref 3.5–5.1)
POTASSIUM BLD-SCNC: 3.9 MMOL/L (ref 3.5–5.1)
POTASSIUM BLD-SCNC: 4 MMOL/L (ref 3.5–5.1)
POTASSIUM BLD-SCNC: 4 MMOL/L (ref 3.5–5.1)
POTASSIUM BLD-SCNC: 4.1 MMOL/L (ref 3.5–5.1)
POTASSIUM BLD-SCNC: 4.2 MMOL/L (ref 3.5–5.1)
POTASSIUM BLD-SCNC: 4.3 MMOL/L (ref 3.5–5.1)
POTASSIUM BLD-SCNC: 4.4 MMOL/L (ref 3.5–5.1)
POTASSIUM BLD-SCNC: 4.5 MMOL/L (ref 3.5–5.1)
POTASSIUM BLD-SCNC: 4.6 MMOL/L (ref 3.5–5.1)
POTASSIUM BLD-SCNC: 4.7 MMOL/L (ref 3.5–5.1)
POTASSIUM BLD-SCNC: 4.8 MMOL/L (ref 3.5–5.1)
POTASSIUM BLD-SCNC: 4.8 MMOL/L (ref 3.5–5.1)
POTASSIUM BLD-SCNC: 4.9 MMOL/L (ref 3.5–5.1)
POTASSIUM BLD-SCNC: 4.9 MMOL/L (ref 3.5–5.1)
POTASSIUM BLD-SCNC: 5 MMOL/L (ref 3.5–5.1)
POTASSIUM BLD-SCNC: 5.1 MMOL/L (ref 3.5–5.1)
POTASSIUM BLD-SCNC: 5.1 MMOL/L (ref 3.5–5.1)
POTASSIUM BLD-SCNC: 5.2 MMOL/L (ref 3.5–5.1)
POTASSIUM BLD-SCNC: 5.3 MMOL/L (ref 3.5–5.1)
POTASSIUM BLD-SCNC: 5.3 MMOL/L (ref 3.5–5.1)
POTASSIUM BLD-SCNC: 5.4 MMOL/L (ref 3.5–5.1)
POTASSIUM BLD-SCNC: 5.5 MMOL/L (ref 3.5–5.1)
POTASSIUM BLD-SCNC: 5.5 MMOL/L (ref 3.5–5.1)
POTASSIUM BLD-SCNC: 5.7 MMOL/L (ref 3.5–5.1)
POTASSIUM BLD-SCNC: 6.8 MMOL/L (ref 3.5–5.1)
POTASSIUM SERPL-SCNC: 3.3 MMOL/L (ref 3.5–5.1)
POTASSIUM SERPL-SCNC: 3.7 MMOL/L (ref 3.5–5.1)
POTASSIUM SERPL-SCNC: 3.8 MMOL/L (ref 3.5–5.1)
POTASSIUM SERPL-SCNC: 3.9 MMOL/L (ref 3.5–5.1)
POTASSIUM SERPL-SCNC: 4 MMOL/L (ref 3.5–5.1)
POTASSIUM SERPL-SCNC: 4.1 MMOL/L (ref 3.5–5.1)
POTASSIUM SERPL-SCNC: 4.2 MMOL/L (ref 3.5–5.1)
POTASSIUM SERPL-SCNC: 4.4 MMOL/L (ref 3.5–5.1)
POTASSIUM SERPL-SCNC: 4.5 MMOL/L (ref 3.5–5.1)
POTASSIUM SERPL-SCNC: 4.6 MMOL/L (ref 3.5–5.1)
POTASSIUM SERPL-SCNC: 4.7 MMOL/L (ref 3.5–5.1)
POTASSIUM SERPL-SCNC: 4.8 MMOL/L (ref 3.5–5.1)
POTASSIUM SERPL-SCNC: 4.9 MMOL/L (ref 3.5–5.1)
POTASSIUM SERPL-SCNC: 4.9 MMOL/L (ref 3.5–5.1)
POTASSIUM SERPL-SCNC: 5 MMOL/L (ref 3.5–5.1)
POTASSIUM SERPL-SCNC: 5 MMOL/L (ref 3.5–5.1)
POTASSIUM SERPL-SCNC: 5.3 MMOL/L (ref 3.5–5.1)
POTASSIUM SERPL-SCNC: 5.5 MMOL/L (ref 3.5–5.1)
POTASSIUM SERPL-SCNC: 5.6 MMOL/L (ref 3.5–5.1)
POTASSIUM SERPL-SCNC: 5.6 MMOL/L (ref 3.5–5.1)
POTASSIUM SERPL-SCNC: 6.2 MMOL/L (ref 3.5–5.1)
POTASSIUM SERPL-SCNC: 6.2 MMOL/L (ref 3.5–5.1)
PROMYELOCYTES NFR BLD MANUAL: 1 %
PROT SERPL-MCNC: 3.8 G/DL (ref 6–8.4)
PROT SERPL-MCNC: 4.3 G/DL (ref 6–8.4)
PROT SERPL-MCNC: 4.4 G/DL (ref 6–8.4)
PROT SERPL-MCNC: 4.5 G/DL (ref 6–8.4)
PROT SERPL-MCNC: 4.6 G/DL (ref 6–8.4)
PROT SERPL-MCNC: 4.7 G/DL (ref 6–8.4)
PROT SERPL-MCNC: 4.8 G/DL (ref 6–8.4)
PROT SERPL-MCNC: 4.9 G/DL (ref 6–8.4)
PROT SERPL-MCNC: 5 G/DL (ref 6–8.4)
PROT SERPL-MCNC: 5.1 G/DL (ref 6–8.4)
PROT SERPL-MCNC: 5.1 G/DL (ref 6–8.4)
PROT SERPL-MCNC: 5.6 G/DL (ref 6–8.4)
PROT SERPL-MCNC: 5.8 G/DL (ref 6–8.4)
PROT SERPL-MCNC: 5.9 G/DL (ref 6–8.4)
PROT SERPL-MCNC: 6 G/DL (ref 6–8.4)
PROT SERPL-MCNC: 6.1 G/DL (ref 6–8.4)
PROT SERPL-MCNC: 6.9 G/DL (ref 6–8.4)
PROTHROMBIN TIME: 11.4 SEC (ref 9–12.5)
PROTHROMBIN TIME: 15.6 SEC (ref 9–12.5)
PROTHROMBIN TIME: 15.6 SEC (ref 9–12.5)
PROTHROMBIN TIME: 15.9 SEC (ref 9–12.5)
PROTHROMBIN TIME: 16 SEC (ref 9–12.5)
PROTHROMBIN TIME: 16.1 SEC (ref 9–12.5)
PROTHROMBIN TIME: 16.2 SEC (ref 9–12.5)
PROTHROMBIN TIME: 16.3 SEC (ref 9–12.5)
PROTHROMBIN TIME: 16.4 SEC (ref 9–12.5)
PROTHROMBIN TIME: 16.9 SEC (ref 9–12.5)
PROTHROMBIN TIME: 17.2 SEC (ref 9–12.5)
PROTHROMBIN TIME: 18.3 SEC (ref 9–12.5)
PROTHROMBIN TIME: 18.6 SEC (ref 9–12.5)
PROTHROMBIN TIME: 18.6 SEC (ref 9–12.5)
PROTHROMBIN TIME: 20 SEC (ref 9–12.5)
PROTHROMBIN TIME: 20.2 SEC (ref 9–12.5)
PROTHROMBIN TIME: 21.5 SEC (ref 9–12.5)
PROTHROMBIN TIME: 21.7 SEC (ref 9–12.5)
PROTHROMBIN TIME: 22.2 SEC (ref 9–12.5)
PROTHROMBIN TIME: 22.5 SEC (ref 9–12.5)
PROTHROMBIN TIME: 23 SEC (ref 9–12.5)
PROTHROMBIN TIME: 24.2 SEC (ref 9–12.5)
PROTHROMBIN TIME: 24.6 SEC (ref 9–12.5)
PROTHROMBIN TIME: 24.7 SEC (ref 9–12.5)
PROTHROMBIN TIME: 25 SEC (ref 9–12.5)
PROTHROMBIN TIME: 25.6 SEC (ref 9–12.5)
PROTHROMBIN TIME: 29.9 SEC (ref 9–12.5)
PROTHROMBIN TIME: 29.9 SEC (ref 9–12.5)
PROTHROMBIN TIME: 31.3 SEC (ref 9–12.5)
PROTHROMBIN TIME: 31.6 SEC (ref 9–12.5)
PROTHROMBIN TIME: 31.8 SEC (ref 9–12.5)
PROTHROMBIN TIME: 34.3 SEC (ref 9–12.5)
RA MAJOR: 3.43 CM
RA WIDTH: 2.04 CM
RBC # BLD AUTO: 2.84 M/UL (ref 4.6–6.2)
RBC # BLD AUTO: 3.14 M/UL (ref 4.6–6.2)
RBC # BLD AUTO: 3.15 M/UL (ref 4.6–6.2)
RBC # BLD AUTO: 3.19 M/UL (ref 4.6–6.2)
RBC # BLD AUTO: 3.2 M/UL (ref 4.6–6.2)
RBC # BLD AUTO: 3.2 M/UL (ref 4.6–6.2)
RBC # BLD AUTO: 3.22 M/UL (ref 4.6–6.2)
RBC # BLD AUTO: 3.24 M/UL (ref 4.6–6.2)
RBC # BLD AUTO: 3.25 M/UL (ref 4.6–6.2)
RBC # BLD AUTO: 3.25 M/UL (ref 4.6–6.2)
RBC # BLD AUTO: 3.27 M/UL (ref 4.6–6.2)
RBC # BLD AUTO: 3.29 M/UL (ref 4.6–6.2)
RBC # BLD AUTO: 3.3 M/UL (ref 4.6–6.2)
RBC # BLD AUTO: 3.32 M/UL (ref 4.6–6.2)
RBC # BLD AUTO: 3.33 M/UL (ref 4.6–6.2)
RBC # BLD AUTO: 3.33 M/UL (ref 4.6–6.2)
RBC # BLD AUTO: 3.35 M/UL (ref 4.6–6.2)
RBC # BLD AUTO: 3.36 M/UL (ref 4.6–6.2)
RBC # BLD AUTO: 3.38 M/UL (ref 4.6–6.2)
RBC # BLD AUTO: 3.38 M/UL (ref 4.6–6.2)
RBC # BLD AUTO: 3.4 M/UL (ref 4.6–6.2)
RBC # BLD AUTO: 3.41 M/UL (ref 4.6–6.2)
RBC # BLD AUTO: 3.42 M/UL (ref 4.6–6.2)
RBC # BLD AUTO: 3.42 M/UL (ref 4.6–6.2)
RBC # BLD AUTO: 3.45 M/UL (ref 4.6–6.2)
RBC # BLD AUTO: 3.47 M/UL (ref 4.6–6.2)
RBC # BLD AUTO: 3.68 M/UL (ref 4.6–6.2)
RBC # BLD AUTO: 3.82 M/UL (ref 4.6–6.2)
RBC # BLD AUTO: 3.92 M/UL (ref 4.6–6.2)
RBC # BLD AUTO: 3.95 M/UL (ref 4.6–6.2)
RBC # BLD AUTO: 3.95 M/UL (ref 4.6–6.2)
RBC # BLD AUTO: 4.8 M/UL (ref 4.6–6.2)
RBC # BLD AUTO: 4.95 M/UL (ref 4.6–6.2)
RH BLD: NORMAL
RIGHT AXILLARY ARTERY MAPPING: 0.38 CM
RIGHT DIST SUBCLAVIAN ARTERY: 0.68 CM
RIGHT MID SUBCLAVIAN ARTERY: 0.62 CM
RIGHT PROX SUBCLAVIAN ARTERY: 0.68 CM
RIGHT VENTRICULAR END-DIASTOLIC DIMENSION: 2.47 CM
SAMPLE: ABNORMAL
SCHISTOCYTES BLD QL SMEAR: ABNORMAL
SINUS: 2.98 CM
SITE: ABNORMAL
SMUDGE CELLS BLD QL SMEAR: PRESENT
SODIUM BLD-SCNC: 135 MMOL/L (ref 136–145)
SODIUM BLD-SCNC: 136 MMOL/L (ref 136–145)
SODIUM BLD-SCNC: 137 MMOL/L (ref 136–145)
SODIUM BLD-SCNC: 138 MMOL/L (ref 136–145)
SODIUM BLD-SCNC: 139 MMOL/L (ref 136–145)
SODIUM BLD-SCNC: 140 MMOL/L (ref 136–145)
SODIUM BLD-SCNC: 140 MMOL/L (ref 136–145)
SODIUM BLD-SCNC: 141 MMOL/L (ref 136–145)
SODIUM BLD-SCNC: 142 MMOL/L (ref 136–145)
SODIUM BLD-SCNC: 144 MMOL/L (ref 136–145)
SODIUM BLD-SCNC: 145 MMOL/L (ref 136–145)
SODIUM BLD-SCNC: 146 MMOL/L (ref 136–145)
SODIUM BLD-SCNC: 147 MMOL/L (ref 136–145)
SODIUM SERPL-SCNC: 134 MMOL/L (ref 136–145)
SODIUM SERPL-SCNC: 134 MMOL/L (ref 136–145)
SODIUM SERPL-SCNC: 135 MMOL/L (ref 136–145)
SODIUM SERPL-SCNC: 136 MMOL/L (ref 136–145)
SODIUM SERPL-SCNC: 137 MMOL/L (ref 136–145)
SODIUM SERPL-SCNC: 138 MMOL/L (ref 136–145)
SODIUM SERPL-SCNC: 139 MMOL/L (ref 136–145)
SODIUM SERPL-SCNC: 140 MMOL/L (ref 136–145)
SODIUM SERPL-SCNC: 141 MMOL/L (ref 136–145)
SODIUM SERPL-SCNC: 141 MMOL/L (ref 136–145)
SP02: 91
SP02: 92
SP02: 96
SP02: 96
SP02: 97
SP02: 97
SP02: 99
SP02: 99
SPHEROCYTES BLD QL SMEAR: ABNORMAL
STJ: 2.41 CM
TOXIC GRANULES BLD QL SMEAR: PRESENT
TOXICOLOGY INFORMATION: NORMAL
TRANS ERYTHROCYTES VOL PATIENT: NORMAL ML
TRANS PLATPHERESIS VOL PATIENT: NORMAL ML
TRICUSPID ANNULAR PLANE SYSTOLIC EXCURSION: 1.06 CM
TRIGL SERPL-MCNC: 160 MG/DL (ref 30–150)
TRIGL SERPL-MCNC: 256 MG/DL (ref 30–150)
TROPONIN I SERPL DL<=0.01 NG/ML-MCNC: 1.23 NG/ML (ref 0–0.03)
UPPER ARTERIAL LEFT ARM AXILLARY SYS MAX: 38 CM/S
UPPER ARTERIAL LEFT ARM SUBCLAVIAN SYS MAX: 71 CM/S
UPPER ARTERIAL RIGHT ARM AXILLARY SYS MAX: 33 CM/S
UPPER ARTERIAL RIGHT ARM SUBCLAVIAN SYS MAX: 66 CM/S
VANCOMYCIN SERPL-MCNC: 3 UG/ML
VANCOMYCIN SERPL-MCNC: 6.1 UG/ML
VANCOMYCIN SERPL-MCNC: 7 UG/ML
VANCOMYCIN SERPL-MCNC: 8.4 UG/ML
VANCOMYCIN SERPL-MCNC: 9.1 UG/ML
VT: 300
VT: 300
VT: 400
VT: 420
VT: 520
WBC # BLD AUTO: 12.79 K/UL (ref 3.9–12.7)
WBC # BLD AUTO: 12.87 K/UL (ref 3.9–12.7)
WBC # BLD AUTO: 13.02 K/UL (ref 3.9–12.7)
WBC # BLD AUTO: 13.12 K/UL (ref 3.9–12.7)
WBC # BLD AUTO: 13.23 K/UL (ref 3.9–12.7)
WBC # BLD AUTO: 13.45 K/UL (ref 3.9–12.7)
WBC # BLD AUTO: 13.73 K/UL (ref 3.9–12.7)
WBC # BLD AUTO: 14 K/UL (ref 3.9–12.7)
WBC # BLD AUTO: 14.03 K/UL (ref 3.9–12.7)
WBC # BLD AUTO: 14.15 K/UL (ref 3.9–12.7)
WBC # BLD AUTO: 14.2 K/UL (ref 3.9–12.7)
WBC # BLD AUTO: 14.24 K/UL (ref 3.9–12.7)
WBC # BLD AUTO: 14.51 K/UL (ref 3.9–12.7)
WBC # BLD AUTO: 15.12 K/UL (ref 3.9–12.7)
WBC # BLD AUTO: 15.41 K/UL (ref 3.9–12.7)
WBC # BLD AUTO: 15.92 K/UL (ref 3.9–12.7)
WBC # BLD AUTO: 16.16 K/UL (ref 3.9–12.7)
WBC # BLD AUTO: 16.18 K/UL (ref 3.9–12.7)
WBC # BLD AUTO: 16.65 K/UL (ref 3.9–12.7)
WBC # BLD AUTO: 17 K/UL (ref 3.9–12.7)
WBC # BLD AUTO: 17.34 K/UL (ref 3.9–12.7)
WBC # BLD AUTO: 17.49 K/UL (ref 3.9–12.7)
WBC # BLD AUTO: 17.81 K/UL (ref 3.9–12.7)
WBC # BLD AUTO: 17.92 K/UL (ref 3.9–12.7)
WBC # BLD AUTO: 18.32 K/UL (ref 3.9–12.7)
WBC # BLD AUTO: 18.48 K/UL (ref 3.9–12.7)
WBC # BLD AUTO: 18.82 K/UL (ref 3.9–12.7)
WBC # BLD AUTO: 19.15 K/UL (ref 3.9–12.7)
WBC # BLD AUTO: 19.35 K/UL (ref 3.9–12.7)
WBC # BLD AUTO: 19.69 K/UL (ref 3.9–12.7)
WBC # BLD AUTO: 20.48 K/UL (ref 3.9–12.7)
WBC # BLD AUTO: 21.07 K/UL (ref 3.9–12.7)
WBC # BLD AUTO: 22.69 K/UL (ref 3.9–12.7)
WBC # BLD AUTO: 25.25 K/UL (ref 3.9–12.7)
WBC # BLD AUTO: 26.54 K/UL (ref 3.9–12.7)
WBC TOXIC VACUOLES BLD QL SMEAR: PRESENT

## 2019-01-01 PROCEDURE — 27000426 HC IMPELLA SET UP

## 2019-01-01 PROCEDURE — C9113 INJ PANTOPRAZOLE SODIUM, VIA: HCPCS | Performed by: THORACIC SURGERY (CARDIOTHORACIC VASCULAR SURGERY)

## 2019-01-01 PROCEDURE — 82550 ASSAY OF CK (CPK): CPT | Mod: 91

## 2019-01-01 PROCEDURE — 80053 COMPREHEN METABOLIC PANEL: CPT | Mod: 91

## 2019-01-01 PROCEDURE — 85610 PROTHROMBIN TIME: CPT

## 2019-01-01 PROCEDURE — 84478 ASSAY OF TRIGLYCERIDES: CPT

## 2019-01-01 PROCEDURE — 84100 ASSAY OF PHOSPHORUS: CPT | Mod: 91

## 2019-01-01 PROCEDURE — 63600175 PHARM REV CODE 636 W HCPCS: Performed by: STUDENT IN AN ORGANIZED HEALTH CARE EDUCATION/TRAINING PROGRAM

## 2019-01-01 PROCEDURE — 86850 RBC ANTIBODY SCREEN: CPT

## 2019-01-01 PROCEDURE — 80069 RENAL FUNCTION PANEL: CPT | Mod: 91

## 2019-01-01 PROCEDURE — 33990 INSJ PERQ VAD L HRT ARTERIAL: CPT | Performed by: INTERNAL MEDICINE

## 2019-01-01 PROCEDURE — 83735 ASSAY OF MAGNESIUM: CPT | Mod: 91

## 2019-01-01 PROCEDURE — 37799 UNLISTED PX VASCULAR SURGERY: CPT

## 2019-01-01 PROCEDURE — 85384 FIBRINOGEN ACTIVITY: CPT

## 2019-01-01 PROCEDURE — 82803 BLOOD GASES ANY COMBINATION: CPT

## 2019-01-01 PROCEDURE — 25000003 PHARM REV CODE 250: Performed by: STUDENT IN AN ORGANIZED HEALTH CARE EDUCATION/TRAINING PROGRAM

## 2019-01-01 PROCEDURE — 36415 COLL VENOUS BLD VENIPUNCTURE: CPT

## 2019-01-01 PROCEDURE — 82330 ASSAY OF CALCIUM: CPT | Mod: 91

## 2019-01-01 PROCEDURE — 85025 COMPLETE CBC W/AUTO DIFF WBC: CPT | Mod: 91

## 2019-01-01 PROCEDURE — 82550 ASSAY OF CK (CPK): CPT

## 2019-01-01 PROCEDURE — 99291 CRITICAL CARE FIRST HOUR: CPT | Mod: 25

## 2019-01-01 PROCEDURE — 83605 ASSAY OF LACTIC ACID: CPT

## 2019-01-01 PROCEDURE — 99900035 HC TECH TIME PER 15 MIN (STAT)

## 2019-01-01 PROCEDURE — 83615 LACTATE (LD) (LDH) ENZYME: CPT

## 2019-01-01 PROCEDURE — 85027 COMPLETE CBC AUTOMATED: CPT | Mod: 91

## 2019-01-01 PROCEDURE — 25000003 PHARM REV CODE 250: Performed by: THORACIC SURGERY (CARDIOTHORACIC VASCULAR SURGERY)

## 2019-01-01 PROCEDURE — P9035 PLATELET PHERES LEUKOREDUCED: HCPCS

## 2019-01-01 PROCEDURE — 37000009 HC ANESTHESIA EA ADD 15 MINS: Performed by: THORACIC SURGERY (CARDIOTHORACIC VASCULAR SURGERY)

## 2019-01-01 PROCEDURE — 99233 SBSQ HOSP IP/OBS HIGH 50: CPT | Mod: ,,, | Performed by: INTERNAL MEDICINE

## 2019-01-01 PROCEDURE — 83735 ASSAY OF MAGNESIUM: CPT

## 2019-01-01 PROCEDURE — P9021 RED BLOOD CELLS UNIT: HCPCS

## 2019-01-01 PROCEDURE — 63600175 PHARM REV CODE 636 W HCPCS: Performed by: THORACIC SURGERY (CARDIOTHORACIC VASCULAR SURGERY)

## 2019-01-01 PROCEDURE — 85730 THROMBOPLASTIN TIME PARTIAL: CPT

## 2019-01-01 PROCEDURE — 93005 ELECTROCARDIOGRAM TRACING: CPT

## 2019-01-01 PROCEDURE — 93010 EKG 12-LEAD: ICD-10-PCS | Mod: 76,,, | Performed by: INTERNAL MEDICINE

## 2019-01-01 PROCEDURE — 63600175 PHARM REV CODE 636 W HCPCS: Performed by: INTERNAL MEDICINE

## 2019-01-01 PROCEDURE — 80176 ASSAY OF LIDOCAINE: CPT

## 2019-01-01 PROCEDURE — 80202 ASSAY OF VANCOMYCIN: CPT

## 2019-01-01 PROCEDURE — 99291 PR CRITICAL CARE, E/M 30-74 MINUTES: ICD-10-PCS | Mod: ,,, | Performed by: INTERNAL MEDICINE

## 2019-01-01 PROCEDURE — 83605 ASSAY OF LACTIC ACID: CPT | Mod: 91

## 2019-01-01 PROCEDURE — 93930 UPPER EXTREMITY STUDY: CPT | Mod: 26,,, | Performed by: INTERNAL MEDICINE

## 2019-01-01 PROCEDURE — 94003 VENT MGMT INPAT SUBQ DAY: CPT

## 2019-01-01 PROCEDURE — 99232 SBSQ HOSP IP/OBS MODERATE 35: CPT | Mod: ,,, | Performed by: NURSE PRACTITIONER

## 2019-01-01 PROCEDURE — 95951 PR EEG MONITORING/VIDEORECORD: ICD-10-PCS | Mod: 26,,, | Performed by: PSYCHIATRY & NEUROLOGY

## 2019-01-01 PROCEDURE — 92928 PR STENT: ICD-10-PCS | Mod: LM,,, | Performed by: INTERNAL MEDICINE

## 2019-01-01 PROCEDURE — 36430 TRANSFUSION BLD/BLD COMPNT: CPT

## 2019-01-01 PROCEDURE — 84132 ASSAY OF SERUM POTASSIUM: CPT

## 2019-01-01 PROCEDURE — 85014 HEMATOCRIT: CPT

## 2019-01-01 PROCEDURE — 99233 PR SUBSEQUENT HOSPITAL CARE,LEVL III: ICD-10-PCS | Mod: ,,, | Performed by: INTERNAL MEDICINE

## 2019-01-01 PROCEDURE — 27200966 HC CLOSED SUCTION SYSTEM

## 2019-01-01 PROCEDURE — 90945 DIALYSIS ONE EVALUATION: CPT

## 2019-01-01 PROCEDURE — 99233 SBSQ HOSP IP/OBS HIGH 50: CPT | Mod: ,,, | Performed by: PSYCHIATRY & NEUROLOGY

## 2019-01-01 PROCEDURE — 63600367 HC NITRIC OXIDE PER HOUR

## 2019-01-01 PROCEDURE — 85610 PROTHROMBIN TIME: CPT | Mod: 91

## 2019-01-01 PROCEDURE — 27000221 HC OXYGEN, UP TO 24 HOURS

## 2019-01-01 PROCEDURE — 85027 COMPLETE CBC AUTOMATED: CPT

## 2019-01-01 PROCEDURE — 99232 PR SUBSEQUENT HOSPITAL CARE,LEVL II: ICD-10-PCS | Mod: ,,, | Performed by: NURSE PRACTITIONER

## 2019-01-01 PROCEDURE — 93312 ECHO TRANSESOPHAGEAL: CPT | Mod: 26,59,, | Performed by: ANESTHESIOLOGY

## 2019-01-01 PROCEDURE — 36592 COLLECT BLOOD FROM PICC: CPT

## 2019-01-01 PROCEDURE — 27590 AMPUTATE LEG AT THIGH: CPT | Mod: 80,LT,, | Performed by: THORACIC SURGERY (CARDIOTHORACIC VASCULAR SURGERY)

## 2019-01-01 PROCEDURE — 90945 DIALYSIS ONE EVALUATION: CPT | Mod: ,,, | Performed by: INTERNAL MEDICINE

## 2019-01-01 PROCEDURE — 85007 BL SMEAR W/DIFF WBC COUNT: CPT | Mod: 91

## 2019-01-01 PROCEDURE — 99233 PR SUBSEQUENT HOSPITAL CARE,LEVL III: ICD-10-PCS | Mod: ,,, | Performed by: PSYCHIATRY & NEUROLOGY

## 2019-01-01 PROCEDURE — 94761 N-INVAS EAR/PLS OXIMETRY MLT: CPT

## 2019-01-01 PROCEDURE — 25000003 PHARM REV CODE 250

## 2019-01-01 PROCEDURE — 85730 THROMBOPLASTIN TIME PARTIAL: CPT | Mod: 91

## 2019-01-01 PROCEDURE — 36000707: Performed by: THORACIC SURGERY (CARDIOTHORACIC VASCULAR SURGERY)

## 2019-01-01 PROCEDURE — 20000000 HC ICU ROOM

## 2019-01-01 PROCEDURE — 95951 HC EEG MONITORING/VIDEO RECORD: CPT

## 2019-01-01 PROCEDURE — 99291 CRITICAL CARE FIRST HOUR: CPT | Mod: ,,, | Performed by: INTERNAL MEDICINE

## 2019-01-01 PROCEDURE — 80100008 HC CRRT DAILY MAINTENANCE

## 2019-01-01 PROCEDURE — 27000203 HC IMPELLA ADD'L DAY (CL)

## 2019-01-01 PROCEDURE — 86880 COOMBS TEST DIRECT: CPT

## 2019-01-01 PROCEDURE — 93010 ELECTROCARDIOGRAM REPORT: CPT | Mod: ,,, | Performed by: INTERNAL MEDICINE

## 2019-01-01 PROCEDURE — 93930 UPPER EXTREMITY STUDY: CPT | Mod: 50

## 2019-01-01 PROCEDURE — 25000003 PHARM REV CODE 250: Performed by: INTERNAL MEDICINE

## 2019-01-01 PROCEDURE — 94002 VENT MGMT INPAT INIT DAY: CPT

## 2019-01-01 PROCEDURE — 82330 ASSAY OF CALCIUM: CPT

## 2019-01-01 PROCEDURE — 99900026 HC AIRWAY MAINTENANCE (STAT)

## 2019-01-01 PROCEDURE — C1894 INTRO/SHEATH, NON-LASER: HCPCS | Performed by: INTERNAL MEDICINE

## 2019-01-01 PROCEDURE — 88307 TISSUE EXAM BY PATHOLOGIST: CPT | Performed by: PATHOLOGY

## 2019-01-01 PROCEDURE — 80069 RENAL FUNCTION PANEL: CPT

## 2019-01-01 PROCEDURE — 99291 PR CRITICAL CARE, E/M 30-74 MINUTES: ICD-10-PCS | Mod: ,,, | Performed by: PHYSICIAN ASSISTANT

## 2019-01-01 PROCEDURE — 27201673 HC ANCILLARY CANNULA

## 2019-01-01 PROCEDURE — C1725 CATH, TRANSLUMIN NON-LASER: HCPCS | Performed by: INTERNAL MEDICINE

## 2019-01-01 PROCEDURE — C1751 CATH, INF, PER/CENT/MIDLINE: HCPCS | Performed by: INTERNAL MEDICINE

## 2019-01-01 PROCEDURE — 88307 TISSUE EXAM BY PATHOLOGIST: CPT | Mod: 26,,, | Performed by: PATHOLOGY

## 2019-01-01 PROCEDURE — 36556 PR INSERT NON-TUNNEL CV CATH 5+ YRS OLD: ICD-10-PCS | Mod: ,,, | Performed by: INTERNAL MEDICINE

## 2019-01-01 PROCEDURE — 25500020 PHARM REV CODE 255: Performed by: INTERNAL MEDICINE

## 2019-01-01 PROCEDURE — 80048 BASIC METABOLIC PNL TOTAL CA: CPT

## 2019-01-01 PROCEDURE — 99499 UNLISTED E&M SERVICE: CPT | Mod: ,,, | Performed by: THORACIC SURGERY (CARDIOTHORACIC VASCULAR SURGERY)

## 2019-01-01 PROCEDURE — 84295 ASSAY OF SERUM SODIUM: CPT

## 2019-01-01 PROCEDURE — 93312 PR ECHO HEART,TRANSESOPHAGEAL: ICD-10-PCS | Mod: 26,59,, | Performed by: ANESTHESIOLOGY

## 2019-01-01 PROCEDURE — 27800903 OPTIME MED/SURG SUP & DEVICES OTHER IMPLANTS: Performed by: INTERNAL MEDICINE

## 2019-01-01 PROCEDURE — 82800 BLOOD PH: CPT

## 2019-01-01 PROCEDURE — C1874 STENT, COATED/COV W/DEL SYS: HCPCS | Performed by: INTERNAL MEDICINE

## 2019-01-01 PROCEDURE — C1887 CATHETER, GUIDING: HCPCS | Performed by: INTERNAL MEDICINE

## 2019-01-01 PROCEDURE — P9045 ALBUMIN (HUMAN), 5%, 250 ML: HCPCS | Mod: JG | Performed by: STUDENT IN AN ORGANIZED HEALTH CARE EDUCATION/TRAINING PROGRAM

## 2019-01-01 PROCEDURE — 25000003 PHARM REV CODE 250: Performed by: EMERGENCY MEDICINE

## 2019-01-01 PROCEDURE — 27201040 HC RC 50 FILTER

## 2019-01-01 PROCEDURE — 93930 CV US DOPPLER ARTERIAL ARMS BILATERAL (CUPID ONLY): ICD-10-PCS | Mod: 26,,, | Performed by: INTERNAL MEDICINE

## 2019-01-01 PROCEDURE — 93454 CORONARY ARTERY ANGIO S&I: CPT | Mod: 59 | Performed by: INTERNAL MEDICINE

## 2019-01-01 PROCEDURE — 95951 PR EEG MONITORING/VIDEORECORD: CPT | Mod: 26,,, | Performed by: PSYCHIATRY & NEUROLOGY

## 2019-01-01 PROCEDURE — 90945 PR DIALYSIS, NOT HEMO, 1 EVAL: ICD-10-PCS | Mod: ,,, | Performed by: INTERNAL MEDICINE

## 2019-01-01 PROCEDURE — 33992 PR REMOVAL, VAD, PERCUT, LT HEART, ART/VENOUS CANNUL: ICD-10-PCS | Mod: 80,,, | Performed by: THORACIC SURGERY (CARDIOTHORACIC VASCULAR SURGERY)

## 2019-01-01 PROCEDURE — 36556 INSERT NON-TUNNEL CV CATH: CPT | Mod: ,,, | Performed by: INTERNAL MEDICINE

## 2019-01-01 PROCEDURE — 99152 MOD SED SAME PHYS/QHP 5/>YRS: CPT | Mod: ,,, | Performed by: INTERNAL MEDICINE

## 2019-01-01 PROCEDURE — 63600175 PHARM REV CODE 636 W HCPCS

## 2019-01-01 PROCEDURE — 99291 PR CRITICAL CARE, E/M 30-74 MINUTES: ICD-10-PCS | Mod: ,,, | Performed by: SURGERY

## 2019-01-01 PROCEDURE — 33949 ECMO/ECLS DAILY MGMT ARTERY: CPT

## 2019-01-01 PROCEDURE — 80053 COMPREHEN METABOLIC PANEL: CPT

## 2019-01-01 PROCEDURE — 37000008 HC ANESTHESIA 1ST 15 MINUTES: Performed by: THORACIC SURGERY (CARDIOTHORACIC VASCULAR SURGERY)

## 2019-01-01 PROCEDURE — 25000003 PHARM REV CODE 250: Performed by: NURSE PRACTITIONER

## 2019-01-01 PROCEDURE — 63600175 PHARM REV CODE 636 W HCPCS: Performed by: EMERGENCY MEDICINE

## 2019-01-01 PROCEDURE — 83050 HGB METHEMOGLOBIN QUAN: CPT

## 2019-01-01 PROCEDURE — 93010 EKG 12-LEAD: ICD-10-PCS | Mod: ,,, | Performed by: INTERNAL MEDICINE

## 2019-01-01 PROCEDURE — 99152 PR MOD CONSCIOUS SEDATION, SAME PHYS, 5+ YRS, FIRST 15 MIN: ICD-10-PCS | Mod: ,,, | Performed by: INTERNAL MEDICINE

## 2019-01-01 PROCEDURE — 27000443 HC TANDEM HEART SETUP

## 2019-01-01 PROCEDURE — 36000706: Performed by: THORACIC SURGERY (CARDIOTHORACIC VASCULAR SURGERY)

## 2019-01-01 PROCEDURE — 27590 PR AMPUTATE THIGH,THRU FEMUR: ICD-10-PCS | Mod: LT,,, | Performed by: THORACIC SURGERY (CARDIOTHORACIC VASCULAR SURGERY)

## 2019-01-01 PROCEDURE — 88307 TISSUE SPECIMEN TO PATHOLOGY - SURGERY: ICD-10-PCS | Mod: 26,,, | Performed by: PATHOLOGY

## 2019-01-01 PROCEDURE — D9220A PRA ANESTHESIA: ICD-10-PCS | Mod: ,,, | Performed by: ANESTHESIOLOGY

## 2019-01-01 PROCEDURE — 99291 CRITICAL CARE FIRST HOUR: CPT | Mod: ,,, | Performed by: SURGERY

## 2019-01-01 PROCEDURE — 82140 ASSAY OF AMMONIA: CPT

## 2019-01-01 PROCEDURE — 33967 INSERT I-AORT PERCUT DEVICE: CPT | Performed by: INTERNAL MEDICINE

## 2019-01-01 PROCEDURE — 25000003 PHARM REV CODE 250: Performed by: GENERAL PRACTICE

## 2019-01-01 PROCEDURE — 33947 ECMO/ECLS INITIATION ARTERY: CPT

## 2019-01-01 PROCEDURE — 27498: ICD-10-PCS | Mod: 51,LT,, | Performed by: THORACIC SURGERY (CARDIOTHORACIC VASCULAR SURGERY)

## 2019-01-01 PROCEDURE — C1769 GUIDE WIRE: HCPCS

## 2019-01-01 PROCEDURE — 99232 SBSQ HOSP IP/OBS MODERATE 35: CPT | Mod: ,,, | Performed by: SURGERY

## 2019-01-01 PROCEDURE — 36620 INSERTION CATHETER ARTERY: CPT | Mod: ,,, | Performed by: INTERNAL MEDICINE

## 2019-01-01 PROCEDURE — 36000710: Performed by: THORACIC SURGERY (CARDIOTHORACIC VASCULAR SURGERY)

## 2019-01-01 PROCEDURE — 99233 PR SUBSEQUENT HOSPITAL CARE,LEVL III: ICD-10-PCS | Mod: ,,, | Performed by: NURSE PRACTITIONER

## 2019-01-01 PROCEDURE — 33992 PR REMOVAL, VAD, PERCUT, LT HEART, ART/VENOUS CANNUL: ICD-10-PCS | Mod: 51,,, | Performed by: THORACIC SURGERY (CARDIOTHORACIC VASCULAR SURGERY)

## 2019-01-01 PROCEDURE — 80307 DRUG TEST PRSMV CHEM ANLYZR: CPT

## 2019-01-01 PROCEDURE — 99291 CRITICAL CARE FIRST HOUR: CPT | Mod: ,,, | Performed by: PHYSICIAN ASSISTANT

## 2019-01-01 PROCEDURE — 96374 THER/PROPH/DIAG INJ IV PUSH: CPT

## 2019-01-01 PROCEDURE — 33992 RMVL PERQ LEFT HEART VAD: CPT | Mod: 51,,, | Performed by: THORACIC SURGERY (CARDIOTHORACIC VASCULAR SURGERY)

## 2019-01-01 PROCEDURE — P9017 PLASMA 1 DONOR FRZ W/IN 8 HR: HCPCS

## 2019-01-01 PROCEDURE — 33967 PR IABP INSERTION: ICD-10-PCS | Mod: 51,,, | Performed by: INTERNAL MEDICINE

## 2019-01-01 PROCEDURE — 85347 COAGULATION TIME ACTIVATED: CPT

## 2019-01-01 PROCEDURE — 86022 PLATELET ANTIBODIES: CPT

## 2019-01-01 PROCEDURE — 27202057 HC OXYGENATOR - CHANGE OUT

## 2019-01-01 PROCEDURE — 83880 ASSAY OF NATRIURETIC PEPTIDE: CPT

## 2019-01-01 PROCEDURE — 86900 BLOOD TYPING SEROLOGIC ABO: CPT

## 2019-01-01 PROCEDURE — C1769 GUIDE WIRE: HCPCS | Performed by: INTERNAL MEDICINE

## 2019-01-01 PROCEDURE — 99232 SBSQ HOSP IP/OBS MODERATE 35: CPT | Mod: ,,, | Performed by: ANESTHESIOLOGY

## 2019-01-01 PROCEDURE — 84100 ASSAY OF PHOSPHORUS: CPT

## 2019-01-01 PROCEDURE — 33991 INSJ PERQ VAD L HRT ARTL&VEN: CPT | Mod: 52,59,, | Performed by: INTERNAL MEDICINE

## 2019-01-01 PROCEDURE — 33991 PR VAD INSERTION PERCUTANEUOS ARTERIAL AND VENOUS ACCESS: ICD-10-PCS | Mod: 52,59,, | Performed by: INTERNAL MEDICINE

## 2019-01-01 PROCEDURE — 99291 CRITICAL CARE FIRST HOUR: CPT | Mod: 25,,, | Performed by: INTERNAL MEDICINE

## 2019-01-01 PROCEDURE — 99291 PR CRITICAL CARE, E/M 30-74 MINUTES: ICD-10-PCS | Mod: 25,,, | Performed by: INTERNAL MEDICINE

## 2019-01-01 PROCEDURE — 86920 COMPATIBILITY TEST SPIN: CPT

## 2019-01-01 PROCEDURE — 33967 INSERT I-AORT PERCUT DEVICE: CPT | Mod: 51,,, | Performed by: INTERNAL MEDICINE

## 2019-01-01 PROCEDURE — 99232 PR SUBSEQUENT HOSPITAL CARE,LEVL II: ICD-10-PCS | Mod: ,,, | Performed by: ANESTHESIOLOGY

## 2019-01-01 PROCEDURE — 84484 ASSAY OF TROPONIN QUANT: CPT

## 2019-01-01 PROCEDURE — C1760 CLOSURE DEV, VASC: HCPCS | Performed by: INTERNAL MEDICINE

## 2019-01-01 PROCEDURE — 93454 PR CATH PLACE/CORONARY ANGIO, IMG SUPER/INTERP: ICD-10-PCS | Mod: 26,59,, | Performed by: INTERNAL MEDICINE

## 2019-01-01 PROCEDURE — 27590 PR AMPUTATE THIGH,THRU FEMUR: ICD-10-PCS | Mod: 80,LT,, | Performed by: THORACIC SURGERY (CARDIOTHORACIC VASCULAR SURGERY)

## 2019-01-01 PROCEDURE — P9047 ALBUMIN (HUMAN), 25%, 50ML: HCPCS | Mod: JG | Performed by: THORACIC SURGERY (CARDIOTHORACIC VASCULAR SURGERY)

## 2019-01-01 PROCEDURE — C9600 PERC DRUG-EL COR STENT SING: HCPCS | Mod: LM | Performed by: INTERNAL MEDICINE

## 2019-01-01 PROCEDURE — 99499 NO LOS: ICD-10-PCS | Mod: ,,, | Performed by: THORACIC SURGERY (CARDIOTHORACIC VASCULAR SURGERY)

## 2019-01-01 PROCEDURE — D9220A PRA ANESTHESIA: Mod: ,,, | Performed by: ANESTHESIOLOGY

## 2019-01-01 PROCEDURE — 27201423 OPTIME MED/SURG SUP & DEVICES STERILE SUPPLY: Performed by: THORACIC SURGERY (CARDIOTHORACIC VASCULAR SURGERY)

## 2019-01-01 PROCEDURE — 93010 EKG 12-LEAD: ICD-10-PCS | Mod: 59,,, | Performed by: INTERNAL MEDICINE

## 2019-01-01 PROCEDURE — 63600175 PHARM REV CODE 636 W HCPCS: Mod: JG

## 2019-01-01 PROCEDURE — 36000711: Performed by: THORACIC SURGERY (CARDIOTHORACIC VASCULAR SURGERY)

## 2019-01-01 PROCEDURE — 27590 AMPUTATE LEG AT THIGH: CPT | Mod: LT,,, | Performed by: THORACIC SURGERY (CARDIOTHORACIC VASCULAR SURGERY)

## 2019-01-01 PROCEDURE — 33992 RMVL PERQ LEFT HEART VAD: CPT | Mod: 80,,, | Performed by: THORACIC SURGERY (CARDIOTHORACIC VASCULAR SURGERY)

## 2019-01-01 PROCEDURE — 93454 CORONARY ARTERY ANGIO S&I: CPT | Mod: 26,59,, | Performed by: INTERNAL MEDICINE

## 2019-01-01 PROCEDURE — P9045 ALBUMIN (HUMAN), 5%, 250 ML: HCPCS | Mod: JG

## 2019-01-01 PROCEDURE — 27201423 OPTIME MED/SURG SUP & DEVICES STERILE SUPPLY: Performed by: INTERNAL MEDICINE

## 2019-01-01 PROCEDURE — 92928 PRQ TCAT PLMT NTRAC ST 1 LES: CPT | Mod: LM,,, | Performed by: INTERNAL MEDICINE

## 2019-01-01 PROCEDURE — 33991 INSJ PERQ VAD L HRT ARTL&VEN: CPT | Mod: 74 | Performed by: INTERNAL MEDICINE

## 2019-01-01 PROCEDURE — 80061 LIPID PANEL: CPT

## 2019-01-01 PROCEDURE — 63600175 PHARM REV CODE 636 W HCPCS: Performed by: NURSE PRACTITIONER

## 2019-01-01 PROCEDURE — 36620 ARTERIAL LINE: ICD-10-PCS | Mod: ,,, | Performed by: INTERNAL MEDICINE

## 2019-01-01 PROCEDURE — 25000003 PHARM REV CODE 250: Performed by: ANESTHESIOLOGY

## 2019-01-01 PROCEDURE — 99232 PR SUBSEQUENT HOSPITAL CARE,LEVL II: ICD-10-PCS | Mod: ,,, | Performed by: SURGERY

## 2019-01-01 PROCEDURE — 36600 WITHDRAWAL OF ARTERIAL BLOOD: CPT

## 2019-01-01 PROCEDURE — 85007 BL SMEAR W/DIFF WBC COUNT: CPT

## 2019-01-01 PROCEDURE — 85025 COMPLETE CBC W/AUTO DIFF WBC: CPT

## 2019-01-01 PROCEDURE — 99233 SBSQ HOSP IP/OBS HIGH 50: CPT | Mod: ,,, | Performed by: NURSE PRACTITIONER

## 2019-01-01 PROCEDURE — 27400108 HC CENTRIMAG BLOOD PUMP IMPLANT KIT

## 2019-01-01 PROCEDURE — 93010 ELECTROCARDIOGRAM REPORT: CPT | Mod: 59,,, | Performed by: INTERNAL MEDICINE

## 2019-01-01 PROCEDURE — C9113 INJ PANTOPRAZOLE SODIUM, VIA: HCPCS | Performed by: STUDENT IN AN ORGANIZED HEALTH CARE EDUCATION/TRAINING PROGRAM

## 2019-01-01 PROCEDURE — 27000444 HC TANDEM HEART, ADD'L DAY

## 2019-01-01 PROCEDURE — 86901 BLOOD TYPING SEROLOGIC RH(D): CPT

## 2019-01-01 PROCEDURE — 80100014 HC HEMODIALYSIS 1:1

## 2019-01-01 PROCEDURE — C1752 CATH,HEMODIALYSIS,SHORT-TERM: HCPCS | Performed by: INTERNAL MEDICINE

## 2019-01-01 PROCEDURE — P9045 ALBUMIN (HUMAN), 5%, 250 ML: HCPCS | Mod: JG | Performed by: THORACIC SURGERY (CARDIOTHORACIC VASCULAR SURGERY)

## 2019-01-01 PROCEDURE — 63600175 PHARM REV CODE 636 W HCPCS: Performed by: ANESTHESIOLOGY

## 2019-01-01 PROCEDURE — 83036 HEMOGLOBIN GLYCOSYLATED A1C: CPT

## 2019-01-01 PROCEDURE — 36556 INSERT NON-TUNNEL CV CATH: CPT | Performed by: INTERNAL MEDICINE

## 2019-01-01 PROCEDURE — 27498 DECOMPRESSION OF THIGH/KNEE: CPT | Mod: 51,LT,, | Performed by: THORACIC SURGERY (CARDIOTHORACIC VASCULAR SURGERY)

## 2019-01-01 DEVICE — PRESSURE INJECTABLE ARROWG+ARD BLUE PLUS(R) THREE-LUMEN CVC KIT
Type: IMPLANTABLE DEVICE | Site: NECK | Status: FUNCTIONAL
Brand: ARROW

## 2019-01-01 DEVICE — STENT RONYX35012UX RESOLUTE ONYX 3.50X12
Type: IMPLANTABLE DEVICE | Site: HEART | Status: FUNCTIONAL
Brand: RESOLUTE ONYX™

## 2019-01-01 DEVICE — IMPLANTABLE DEVICE: Type: IMPLANTABLE DEVICE | Site: GROIN | Status: FUNCTIONAL

## 2019-01-01 RX ORDER — CALCIUM CHLORIDE INJECTION 100 MG/ML
1 INJECTION, SOLUTION INTRAVENOUS ONCE
Status: COMPLETED | OUTPATIENT
Start: 2019-01-01 | End: 2019-01-01

## 2019-01-01 RX ORDER — MAGNESIUM SULFATE HEPTAHYDRATE 40 MG/ML
2 INJECTION, SOLUTION INTRAVENOUS
Status: DISCONTINUED | OUTPATIENT
Start: 2019-01-01 | End: 2019-01-01

## 2019-01-01 RX ORDER — LIDOCAINE HYDROCHLORIDE 20 MG/ML
INJECTION, SOLUTION EPIDURAL; INFILTRATION; INTRACAUDAL; PERINEURAL
Status: DISCONTINUED | OUTPATIENT
Start: 2019-01-01 | End: 2019-01-01 | Stop reason: HOSPADM

## 2019-01-01 RX ORDER — OXYCODONE HYDROCHLORIDE 10 MG/1
10 TABLET ORAL EVERY 4 HOURS PRN
Status: DISCONTINUED | OUTPATIENT
Start: 2019-01-01 | End: 2019-04-05 | Stop reason: HOSPADM

## 2019-01-01 RX ORDER — ASPIRIN 325 MG
325 TABLET ORAL ONCE
Status: DISCONTINUED | OUTPATIENT
Start: 2019-01-01 | End: 2019-01-01

## 2019-01-01 RX ORDER — NICARDIPINE HYDROCHLORIDE 0.2 MG/ML
INJECTION INTRAVENOUS
Status: COMPLETED
Start: 2019-01-01 | End: 2019-01-01

## 2019-01-01 RX ORDER — VANCOMYCIN 1.75 GRAM/500 ML IN 0.9 % SODIUM CHLORIDE INTRAVENOUS
1750
Status: DISCONTINUED | OUTPATIENT
Start: 2019-01-01 | End: 2019-01-01

## 2019-01-01 RX ORDER — POTASSIUM CHLORIDE 14.9 MG/ML
60 INJECTION INTRAVENOUS
Status: DISCONTINUED | OUTPATIENT
Start: 2019-01-01 | End: 2019-01-01

## 2019-01-01 RX ORDER — INDOMETHACIN 25 MG/1
CAPSULE ORAL
Status: DISPENSED
Start: 2019-01-01 | End: 2019-01-01

## 2019-01-01 RX ORDER — POTASSIUM CHLORIDE 14.9 MG/ML
20 INJECTION INTRAVENOUS ONCE
Status: COMPLETED | OUTPATIENT
Start: 2019-01-01 | End: 2019-01-01

## 2019-01-01 RX ORDER — HYDROCODONE BITARTRATE AND ACETAMINOPHEN 500; 5 MG/1; MG/1
TABLET ORAL CONTINUOUS
Status: DISCONTINUED | OUTPATIENT
Start: 2019-01-01 | End: 2019-01-01

## 2019-01-01 RX ORDER — AMIODARONE HYDROCHLORIDE 150 MG/3ML
INJECTION, SOLUTION INTRAVENOUS
Status: COMPLETED
Start: 2019-01-01 | End: 2019-01-01

## 2019-01-01 RX ORDER — NOREPINEPHRINE BITARTRATE/D5W 4MG/250ML
0.02 PLASTIC BAG, INJECTION (ML) INTRAVENOUS CONTINUOUS
Status: DISCONTINUED | OUTPATIENT
Start: 2019-01-01 | End: 2019-01-01

## 2019-01-01 RX ORDER — ALBUMIN HUMAN 50 G/1000ML
25 SOLUTION INTRAVENOUS ONCE
Status: COMPLETED | OUTPATIENT
Start: 2019-01-01 | End: 2019-01-01

## 2019-01-01 RX ORDER — INDOMETHACIN 25 MG/1
50 CAPSULE ORAL ONCE
Status: COMPLETED | OUTPATIENT
Start: 2019-01-01 | End: 2019-01-01

## 2019-01-01 RX ORDER — INDOMETHACIN 25 MG/1
CAPSULE ORAL
Status: COMPLETED
Start: 2019-01-01 | End: 2019-01-01

## 2019-01-01 RX ORDER — ATORVASTATIN CALCIUM 20 MG/1
40 TABLET, FILM COATED ORAL DAILY
Status: DISCONTINUED | OUTPATIENT
Start: 2019-01-01 | End: 2019-01-01

## 2019-01-01 RX ORDER — CALCIUM CHLORIDE INJECTION 100 MG/ML
2 INJECTION, SOLUTION INTRAVENOUS ONCE
Status: DISCONTINUED | OUTPATIENT
Start: 2019-01-01 | End: 2019-01-01

## 2019-01-01 RX ORDER — TIROFIBAN HYDROCHLORIDE 50 UG/ML
0.15 INJECTION INTRAVENOUS CONTINUOUS
Status: DISCONTINUED | OUTPATIENT
Start: 2019-01-01 | End: 2019-01-01

## 2019-01-01 RX ORDER — MORPHINE SULFATE 4 MG/ML
INJECTION, SOLUTION INTRAMUSCULAR; INTRAVENOUS
Status: DISPENSED
Start: 2019-01-01 | End: 2019-01-01

## 2019-01-01 RX ORDER — EPINEPHRINE 1 MG/ML
INJECTION INTRAMUSCULAR; INTRAVENOUS; SUBCUTANEOUS
Status: DISPENSED
Start: 2019-01-01 | End: 2019-01-01

## 2019-01-01 RX ORDER — DEXMEDETOMIDINE HYDROCHLORIDE 4 UG/ML
INJECTION, SOLUTION INTRAVENOUS
Status: COMPLETED
Start: 2019-01-01 | End: 2019-01-01

## 2019-01-01 RX ORDER — POTASSIUM CHLORIDE 29.8 MG/ML
40 INJECTION INTRAVENOUS ONCE
Status: COMPLETED | OUTPATIENT
Start: 2019-01-01 | End: 2019-01-01

## 2019-01-01 RX ORDER — HYDROCODONE BITARTRATE AND ACETAMINOPHEN 500; 5 MG/1; MG/1
TABLET ORAL
Status: DISCONTINUED | OUTPATIENT
Start: 2019-01-01 | End: 2019-01-01

## 2019-01-01 RX ORDER — CHLORHEXIDINE GLUCONATE ORAL RINSE 1.2 MG/ML
15 SOLUTION DENTAL 2 TIMES DAILY
Status: DISCONTINUED | OUTPATIENT
Start: 2019-01-01 | End: 2019-04-05 | Stop reason: HOSPADM

## 2019-01-01 RX ORDER — POTASSIUM CHLORIDE 29.8 MG/ML
40 INJECTION INTRAVENOUS
Status: DISCONTINUED | OUTPATIENT
Start: 2019-01-01 | End: 2019-01-01

## 2019-01-01 RX ORDER — MAGNESIUM SULFATE HEPTAHYDRATE 40 MG/ML
2 INJECTION, SOLUTION INTRAVENOUS
Status: COMPLETED | OUTPATIENT
Start: 2019-01-01 | End: 2019-01-01

## 2019-01-01 RX ORDER — MAGNESIUM SULFATE HEPTAHYDRATE 40 MG/ML
2 INJECTION, SOLUTION INTRAVENOUS ONCE
Status: COMPLETED | OUTPATIENT
Start: 2019-01-01 | End: 2019-01-01

## 2019-01-01 RX ORDER — ONDANSETRON 2 MG/ML
INJECTION INTRAMUSCULAR; INTRAVENOUS
Status: DISCONTINUED | OUTPATIENT
Start: 2019-01-01 | End: 2019-01-01

## 2019-01-01 RX ORDER — ACETAMINOPHEN 325 MG/1
650 TABLET ORAL EVERY 6 HOURS PRN
Status: DISCONTINUED | OUTPATIENT
Start: 2019-01-01 | End: 2019-04-05 | Stop reason: HOSPADM

## 2019-01-01 RX ORDER — INDOMETHACIN 25 MG/1
50 CAPSULE ORAL ONCE
Status: DISCONTINUED | OUTPATIENT
Start: 2019-01-01 | End: 2019-01-01

## 2019-01-01 RX ORDER — POTASSIUM CHLORIDE 14.9 MG/ML
20 INJECTION INTRAVENOUS
Status: DISCONTINUED | OUTPATIENT
Start: 2019-01-01 | End: 2019-01-01

## 2019-01-01 RX ORDER — GLUCAGON 1 MG
1 KIT INJECTION
Status: DISCONTINUED | OUTPATIENT
Start: 2019-01-01 | End: 2019-04-05 | Stop reason: HOSPADM

## 2019-01-01 RX ORDER — ONDANSETRON 8 MG/1
8 TABLET, ORALLY DISINTEGRATING ORAL EVERY 8 HOURS PRN
Status: DISCONTINUED | OUTPATIENT
Start: 2019-01-01 | End: 2019-04-05 | Stop reason: HOSPADM

## 2019-01-01 RX ORDER — INSULIN ASPART 100 [IU]/ML
0-5 INJECTION, SOLUTION INTRAVENOUS; SUBCUTANEOUS EVERY 4 HOURS
Status: DISCONTINUED | OUTPATIENT
Start: 2019-01-01 | End: 2019-01-01

## 2019-01-01 RX ORDER — ALBUMIN HUMAN 250 G/1000ML
25 SOLUTION INTRAVENOUS ONCE
Status: COMPLETED | OUTPATIENT
Start: 2019-01-01 | End: 2019-01-01

## 2019-01-01 RX ORDER — MORPHINE SULFATE 2 MG/ML
1 INJECTION, SOLUTION INTRAMUSCULAR; INTRAVENOUS
Status: DISCONTINUED | OUTPATIENT
Start: 2019-01-01 | End: 2019-04-05 | Stop reason: HOSPADM

## 2019-01-01 RX ORDER — METHYLPREDNISOLONE SOD SUCC 125 MG
125 VIAL (EA) INJECTION ONCE
Status: DISCONTINUED | OUTPATIENT
Start: 2019-01-01 | End: 2019-01-01

## 2019-01-01 RX ORDER — ONDANSETRON 2 MG/ML
4 INJECTION INTRAMUSCULAR; INTRAVENOUS EVERY 12 HOURS PRN
Status: DISCONTINUED | OUTPATIENT
Start: 2019-01-01 | End: 2019-04-05 | Stop reason: HOSPADM

## 2019-01-01 RX ORDER — MIDAZOLAM HYDROCHLORIDE 1 MG/ML
INJECTION, SOLUTION INTRAMUSCULAR; INTRAVENOUS
Status: DISCONTINUED | OUTPATIENT
Start: 2019-01-01 | End: 2019-01-01

## 2019-01-01 RX ORDER — NITROGLYCERIN 5 MG/ML
INJECTION, SOLUTION INTRAVENOUS
Status: DISCONTINUED | OUTPATIENT
Start: 2019-01-01 | End: 2019-01-01 | Stop reason: HOSPADM

## 2019-01-01 RX ORDER — SODIUM CHLORIDE 0.9 % (FLUSH) 0.9 %
10 SYRINGE (ML) INJECTION
Status: DISCONTINUED | OUTPATIENT
Start: 2019-01-01 | End: 2019-01-01

## 2019-01-01 RX ORDER — HEPARIN SODIUM 1000 [USP'U]/ML
INJECTION, SOLUTION INTRAVENOUS; SUBCUTANEOUS
Status: DISCONTINUED | OUTPATIENT
Start: 2019-01-01 | End: 2019-01-01 | Stop reason: HOSPADM

## 2019-01-01 RX ORDER — LIDOCAINE HCL/PF 100 MG/5ML
100 SYRINGE (ML) INTRAVENOUS ONCE
Status: COMPLETED | OUTPATIENT
Start: 2019-01-01 | End: 2019-01-01

## 2019-01-01 RX ORDER — NOREPINEPHRINE BITARTRATE 1 MG/ML
32 INJECTION, SOLUTION INTRAVENOUS ONCE
Status: COMPLETED | OUTPATIENT
Start: 2019-01-01 | End: 2019-01-01

## 2019-01-01 RX ORDER — EPINEPHRINE 1 MG/ML
0.5 INJECTION, SOLUTION INTRACARDIAC; INTRAMUSCULAR; INTRAVENOUS; SUBCUTANEOUS ONCE
Status: COMPLETED | OUTPATIENT
Start: 2019-01-01 | End: 2019-01-01

## 2019-01-01 RX ORDER — MAGNESIUM SULFATE HEPTAHYDRATE 40 MG/ML
2 INJECTION, SOLUTION INTRAVENOUS
Status: DISPENSED | OUTPATIENT
Start: 2019-01-01 | End: 2019-01-01

## 2019-01-01 RX ORDER — HEPARIN SODIUM 200 [USP'U]/100ML
INJECTION, SOLUTION INTRAVENOUS
Status: DISCONTINUED | OUTPATIENT
Start: 2019-01-01 | End: 2019-01-01

## 2019-01-01 RX ORDER — CALCIUM GLUCONATE 98 MG/ML
INJECTION, SOLUTION INTRAVENOUS
Status: DISCONTINUED | OUTPATIENT
Start: 2019-01-01 | End: 2019-01-01

## 2019-01-01 RX ORDER — BISACODYL 10 MG
10 SUPPOSITORY, RECTAL RECTAL EVERY 12 HOURS PRN
Status: DISCONTINUED | OUTPATIENT
Start: 2019-01-01 | End: 2019-04-05 | Stop reason: HOSPADM

## 2019-01-01 RX ORDER — NICARDIPINE HYDROCHLORIDE 0.2 MG/ML
1 INJECTION INTRAVENOUS CONTINUOUS
Status: DISCONTINUED | OUTPATIENT
Start: 2019-01-01 | End: 2019-04-05 | Stop reason: HOSPADM

## 2019-01-01 RX ORDER — HEPARIN SODIUM 10000 [USP'U]/100ML
1300 INJECTION, SOLUTION INTRAVENOUS CONTINUOUS
Status: DISCONTINUED | OUTPATIENT
Start: 2019-01-01 | End: 2019-04-05 | Stop reason: HOSPADM

## 2019-01-01 RX ORDER — TIROFIBAN HYDROCHLORIDE 50 UG/ML
INJECTION INTRAVENOUS
Status: DISCONTINUED | OUTPATIENT
Start: 2019-01-01 | End: 2019-01-01

## 2019-01-01 RX ORDER — CEFAZOLIN SODIUM 1 G/3ML
2 INJECTION, POWDER, FOR SOLUTION INTRAMUSCULAR; INTRAVENOUS
Status: DISCONTINUED | OUTPATIENT
Start: 2019-01-01 | End: 2019-01-01

## 2019-01-01 RX ORDER — ASPIRIN 325 MG
325 TABLET ORAL DAILY
Status: DISCONTINUED | OUTPATIENT
Start: 2019-01-01 | End: 2019-01-01

## 2019-01-01 RX ORDER — PROPOFOL 10 MG/ML
INJECTION, EMULSION INTRAVENOUS
Status: COMPLETED
Start: 2019-01-01 | End: 2019-01-01

## 2019-01-01 RX ORDER — MORPHINE SULFATE 4 MG/ML
4 INJECTION, SOLUTION INTRAMUSCULAR; INTRAVENOUS
Status: DISCONTINUED | OUTPATIENT
Start: 2019-01-01 | End: 2019-01-01

## 2019-01-01 RX ORDER — NICARDIPINE HYDROCHLORIDE 0.2 MG/ML
5 INJECTION INTRAVENOUS CONTINUOUS
Status: DISCONTINUED | OUTPATIENT
Start: 2019-01-01 | End: 2019-01-01

## 2019-01-01 RX ORDER — NAPROXEN SODIUM 220 MG/1
81 TABLET, FILM COATED ORAL DAILY
Status: DISCONTINUED | OUTPATIENT
Start: 2019-01-01 | End: 2019-04-05 | Stop reason: HOSPADM

## 2019-01-01 RX ORDER — CALCIUM CHLORIDE INJECTION 100 MG/ML
2 INJECTION, SOLUTION INTRAVENOUS ONCE
Status: COMPLETED | OUTPATIENT
Start: 2019-01-01 | End: 2019-01-01

## 2019-01-01 RX ORDER — DEXMEDETOMIDINE HYDROCHLORIDE 4 UG/ML
0.2 INJECTION, SOLUTION INTRAVENOUS CONTINUOUS
Status: DISCONTINUED | OUTPATIENT
Start: 2019-01-01 | End: 2019-01-01

## 2019-01-01 RX ORDER — CEFAZOLIN SODIUM 1 G/3ML
2 INJECTION, POWDER, FOR SOLUTION INTRAMUSCULAR; INTRAVENOUS
Status: COMPLETED | OUTPATIENT
Start: 2019-01-01 | End: 2019-01-01

## 2019-01-01 RX ORDER — INSULIN ASPART 100 [IU]/ML
0-5 INJECTION, SOLUTION INTRAVENOUS; SUBCUTANEOUS EVERY 6 HOURS PRN
Status: DISCONTINUED | OUTPATIENT
Start: 2019-01-01 | End: 2019-04-05 | Stop reason: HOSPADM

## 2019-01-01 RX ORDER — NOREPINEPHRINE BITARTRATE 1 MG/ML
INJECTION, SOLUTION INTRAVENOUS
Status: COMPLETED
Start: 2019-01-01 | End: 2019-01-01

## 2019-01-01 RX ORDER — PANTOPRAZOLE SODIUM 40 MG/10ML
40 INJECTION, POWDER, LYOPHILIZED, FOR SOLUTION INTRAVENOUS DAILY
Status: DISCONTINUED | OUTPATIENT
Start: 2019-01-01 | End: 2019-01-01

## 2019-01-01 RX ORDER — NOREPINEPHRINE BITARTRATE 1 MG/ML
INJECTION, SOLUTION INTRAVENOUS
Status: DISPENSED
Start: 2019-01-01 | End: 2019-01-01

## 2019-01-01 RX ORDER — FUROSEMIDE 10 MG/ML
INJECTION INTRAMUSCULAR; INTRAVENOUS
Status: DISCONTINUED | OUTPATIENT
Start: 2019-01-01 | End: 2019-01-01 | Stop reason: HOSPADM

## 2019-01-01 RX ORDER — HYDROCODONE BITARTRATE AND ACETAMINOPHEN 500; 5 MG/1; MG/1
TABLET ORAL
Status: DISCONTINUED | OUTPATIENT
Start: 2019-01-01 | End: 2019-04-05 | Stop reason: HOSPADM

## 2019-01-01 RX ORDER — HYDROCODONE BITARTRATE AND ACETAMINOPHEN 500; 5 MG/1; MG/1
TABLET ORAL CONTINUOUS
Status: ACTIVE | OUTPATIENT
Start: 2019-01-01 | End: 2019-01-01

## 2019-01-01 RX ORDER — MAGNESIUM SULFATE HEPTAHYDRATE 40 MG/ML
2 INJECTION, SOLUTION INTRAVENOUS
Status: DISCONTINUED | OUTPATIENT
Start: 2019-01-01 | End: 2019-04-05 | Stop reason: HOSPADM

## 2019-01-01 RX ORDER — ALBUMIN HUMAN 50 G/1000ML
SOLUTION INTRAVENOUS
Status: COMPLETED
Start: 2019-01-01 | End: 2019-01-01

## 2019-01-01 RX ORDER — DOBUTAMINE HYDROCHLORIDE 400 MG/100ML
5 INJECTION, SOLUTION INTRAVENOUS CONTINUOUS
Status: DISCONTINUED | OUTPATIENT
Start: 2019-01-01 | End: 2019-01-01

## 2019-01-01 RX ORDER — DIPHENHYDRAMINE HCL 50 MG
50 CAPSULE ORAL ONCE
Status: DISCONTINUED | OUTPATIENT
Start: 2019-01-01 | End: 2019-01-01

## 2019-01-01 RX ORDER — FENTANYL CITRATE 50 UG/ML
INJECTION, SOLUTION INTRAMUSCULAR; INTRAVENOUS
Status: DISCONTINUED | OUTPATIENT
Start: 2019-01-01 | End: 2019-01-01

## 2019-01-01 RX ORDER — FLUCONAZOLE 2 MG/ML
400 INJECTION, SOLUTION INTRAVENOUS
Status: DISCONTINUED | OUTPATIENT
Start: 2019-01-01 | End: 2019-04-05 | Stop reason: HOSPADM

## 2019-01-01 RX ORDER — ETOMIDATE 2 MG/ML
INJECTION INTRAVENOUS
Status: DISCONTINUED | OUTPATIENT
Start: 2019-01-01 | End: 2019-01-01

## 2019-01-01 RX ORDER — AMIODARONE HYDROCHLORIDE 50 MG/ML
150 INJECTION, SOLUTION INTRAVENOUS ONCE
Status: COMPLETED | OUTPATIENT
Start: 2019-01-01 | End: 2019-01-01

## 2019-01-01 RX ORDER — NOREPINEPHRINE BITARTRATE/D5W 4MG/250ML
PLASTIC BAG, INJECTION (ML) INTRAVENOUS
Status: COMPLETED
Start: 2019-01-01 | End: 2019-01-01

## 2019-01-01 RX ORDER — DEXTROSE MONOHYDRATE, SODIUM CHLORIDE, AND POTASSIUM CHLORIDE 50; 1.49; 9 G/1000ML; G/1000ML; G/1000ML
5 INJECTION, SOLUTION INTRAVENOUS CONTINUOUS
Status: DISCONTINUED | OUTPATIENT
Start: 2019-01-01 | End: 2019-01-01

## 2019-01-01 RX ORDER — PANTOPRAZOLE SODIUM 40 MG/10ML
40 INJECTION, POWDER, LYOPHILIZED, FOR SOLUTION INTRAVENOUS 2 TIMES DAILY
Status: DISCONTINUED | OUTPATIENT
Start: 2019-01-01 | End: 2019-04-05 | Stop reason: HOSPADM

## 2019-01-01 RX ORDER — POTASSIUM CHLORIDE 7.45 MG/ML
20 INJECTION INTRAVENOUS
Status: DISCONTINUED | OUTPATIENT
Start: 2019-01-01 | End: 2019-01-01

## 2019-01-01 RX ORDER — PROPOFOL 10 MG/ML
10 INJECTION, EMULSION INTRAVENOUS CONTINUOUS
Status: DISCONTINUED | OUTPATIENT
Start: 2019-01-01 | End: 2019-01-01

## 2019-01-01 RX ORDER — LORAZEPAM 2 MG/ML
0.5 INJECTION INTRAMUSCULAR EVERY 30 MIN PRN
Status: DISCONTINUED | OUTPATIENT
Start: 2019-01-01 | End: 2019-04-05 | Stop reason: HOSPADM

## 2019-01-01 RX ORDER — PHENYLEPHRINE HYDROCHLORIDE 10 MG/ML
INJECTION INTRAVENOUS
Status: DISCONTINUED | OUTPATIENT
Start: 2019-01-01 | End: 2019-01-01 | Stop reason: HOSPADM

## 2019-01-01 RX ORDER — PROPOFOL 10 MG/ML
5 INJECTION, EMULSION INTRAVENOUS CONTINUOUS
Status: DISCONTINUED | OUTPATIENT
Start: 2019-01-01 | End: 2019-01-01

## 2019-01-01 RX ORDER — CEFAZOLIN SODIUM 1 G/3ML
INJECTION, POWDER, FOR SOLUTION INTRAMUSCULAR; INTRAVENOUS
Status: DISCONTINUED | OUTPATIENT
Start: 2019-01-01 | End: 2019-01-01 | Stop reason: HOSPADM

## 2019-01-01 RX ORDER — MUPIROCIN 20 MG/G
1 OINTMENT TOPICAL 2 TIMES DAILY
Status: COMPLETED | OUTPATIENT
Start: 2019-01-01 | End: 2019-01-01

## 2019-01-01 RX ORDER — LIDOCAINE HYDROCHLORIDE 20 MG/ML
100 INJECTION, SOLUTION INFILTRATION; PERINEURAL ONCE
Status: COMPLETED | OUTPATIENT
Start: 2019-01-01 | End: 2019-01-01

## 2019-01-01 RX ORDER — SUCCINYLCHOLINE CHLORIDE 20 MG/ML
INJECTION INTRAMUSCULAR; INTRAVENOUS
Status: DISCONTINUED | OUTPATIENT
Start: 2019-01-01 | End: 2019-01-01

## 2019-01-01 RX ORDER — ACETAMINOPHEN 325 MG/1
650 TABLET ORAL EVERY 4 HOURS PRN
Status: DISCONTINUED | OUTPATIENT
Start: 2019-01-01 | End: 2019-04-05 | Stop reason: HOSPADM

## 2019-01-01 RX ORDER — FENTANYL CITRATE 50 UG/ML
INJECTION, SOLUTION INTRAMUSCULAR; INTRAVENOUS
Status: DISCONTINUED
Start: 2019-01-01 | End: 2019-01-01 | Stop reason: WASHOUT

## 2019-01-01 RX ORDER — ROCURONIUM BROMIDE 10 MG/ML
INJECTION, SOLUTION INTRAVENOUS
Status: DISCONTINUED | OUTPATIENT
Start: 2019-01-01 | End: 2019-01-01

## 2019-01-01 RX ORDER — OXYCODONE HYDROCHLORIDE 5 MG/1
5 TABLET ORAL EVERY 4 HOURS PRN
Status: DISCONTINUED | OUTPATIENT
Start: 2019-01-01 | End: 2019-04-05 | Stop reason: HOSPADM

## 2019-01-01 RX ORDER — POLYETHYLENE GLYCOL 3350 17 G/17G
17 POWDER, FOR SOLUTION ORAL DAILY
Status: DISCONTINUED | OUTPATIENT
Start: 2019-01-01 | End: 2019-04-05 | Stop reason: HOSPADM

## 2019-01-01 RX ORDER — SODIUM CHLORIDE 9 MG/ML
3 INJECTION, SOLUTION INTRAVENOUS CONTINUOUS
Status: DISCONTINUED | OUTPATIENT
Start: 2019-01-01 | End: 2019-01-01

## 2019-01-01 RX ORDER — ALBUMIN HUMAN 50 G/1000ML
12.5 SOLUTION INTRAVENOUS ONCE
Status: COMPLETED | OUTPATIENT
Start: 2019-01-01 | End: 2019-01-01

## 2019-01-01 RX ORDER — ONDANSETRON 2 MG/ML
4 INJECTION INTRAMUSCULAR; INTRAVENOUS EVERY 6 HOURS PRN
Status: DISCONTINUED | OUTPATIENT
Start: 2019-01-01 | End: 2019-01-01

## 2019-01-01 RX ORDER — ALBUMIN HUMAN 50 G/1000ML
SOLUTION INTRAVENOUS
Status: DISPENSED
Start: 2019-01-01 | End: 2019-01-01

## 2019-01-01 RX ORDER — HYDROCODONE BITARTRATE AND ACETAMINOPHEN 500; 5 MG/1; MG/1
TABLET ORAL CONTINUOUS
Status: DISCONTINUED | OUTPATIENT
Start: 2019-01-01 | End: 2019-04-05 | Stop reason: HOSPADM

## 2019-01-01 RX ORDER — VASOPRESSIN 20 [USP'U]/ML
INJECTION, SOLUTION INTRAMUSCULAR; SUBCUTANEOUS
Status: DISPENSED
Start: 2019-01-01 | End: 2019-01-01

## 2019-01-01 RX ORDER — LIDOCAINE HYDROCHLORIDE 20 MG/ML
200 INJECTION, SOLUTION EPIDURAL; INFILTRATION; INTRACAUDAL; PERINEURAL ONCE
Status: COMPLETED | OUTPATIENT
Start: 2019-01-01 | End: 2019-01-01

## 2019-01-01 RX ORDER — POTASSIUM CHLORIDE 7.45 MG/ML
40 INJECTION INTRAVENOUS
Status: DISCONTINUED | OUTPATIENT
Start: 2019-01-01 | End: 2019-01-01

## 2019-01-01 RX ORDER — DIPHENHYDRAMINE HYDROCHLORIDE 50 MG/ML
50 INJECTION INTRAMUSCULAR; INTRAVENOUS ONCE
Status: CANCELLED | OUTPATIENT
Start: 2019-01-01

## 2019-01-01 RX ORDER — NAPROXEN SODIUM 220 MG/1
81 TABLET, FILM COATED ORAL DAILY
Status: DISCONTINUED | OUTPATIENT
Start: 2019-01-01 | End: 2019-01-01

## 2019-01-01 RX ORDER — LIDOCAINE HYDROCHLORIDE 20 MG/ML
100 INJECTION, SOLUTION INFILTRATION; PERINEURAL ONCE
Status: DISCONTINUED | OUTPATIENT
Start: 2019-01-01 | End: 2019-04-05 | Stop reason: HOSPADM

## 2019-01-01 RX ORDER — PANTOPRAZOLE SODIUM 40 MG/10ML
40 INJECTION, POWDER, LYOPHILIZED, FOR SOLUTION INTRAVENOUS 2 TIMES DAILY
Status: DISCONTINUED | OUTPATIENT
Start: 2019-01-01 | End: 2019-01-01

## 2019-01-01 RX ORDER — LIDOCAINE HYDROCHLORIDE ANHYDROUS AND DEXTROSE MONOHYDRATE .8; 5 G/100ML; G/100ML
4 INJECTION, SOLUTION INTRAVENOUS CONTINUOUS
Status: DISCONTINUED | OUTPATIENT
Start: 2019-01-01 | End: 2019-04-05 | Stop reason: HOSPADM

## 2019-01-01 RX ORDER — VANCOMYCIN HCL IN 5 % DEXTROSE 1G/250ML
1000 PLASTIC BAG, INJECTION (ML) INTRAVENOUS
Status: DISCONTINUED | OUTPATIENT
Start: 2019-01-01 | End: 2019-04-05 | Stop reason: HOSPADM

## 2019-01-01 RX ORDER — FUROSEMIDE 10 MG/ML
80 INJECTION INTRAMUSCULAR; INTRAVENOUS ONCE
Status: DISCONTINUED | OUTPATIENT
Start: 2019-01-01 | End: 2019-01-01

## 2019-01-01 RX ORDER — LIDOCAINE HYDROCHLORIDE 10 MG/ML
INJECTION, SOLUTION EPIDURAL; INFILTRATION; INTRACAUDAL; PERINEURAL
Status: DISPENSED
Start: 2019-01-01 | End: 2019-01-01

## 2019-01-01 RX ORDER — POTASSIUM CHLORIDE 7.45 MG/ML
60 INJECTION INTRAVENOUS
Status: DISCONTINUED | OUTPATIENT
Start: 2019-01-01 | End: 2019-01-01

## 2019-01-01 RX ORDER — CLOPIDOGREL BISULFATE 75 MG/1
75 TABLET ORAL DAILY
Status: DISCONTINUED | OUTPATIENT
Start: 2019-01-01 | End: 2019-04-05 | Stop reason: HOSPADM

## 2019-01-01 RX ADMIN — CALCIUM CHLORIDE 1 G: 100 INJECTION INTRAVENOUS; INTRAVENTRICULAR at 01:04

## 2019-01-01 RX ADMIN — PANTOPRAZOLE SODIUM 40 MG: 40 INJECTION, POWDER, FOR SOLUTION INTRAVENOUS at 08:03

## 2019-01-01 RX ADMIN — AMIODARONE HYDROCHLORIDE 1 MG/MIN: 1.8 INJECTION, SOLUTION INTRAVENOUS at 12:04

## 2019-01-01 RX ADMIN — MAGNESIUM SULFATE 2 G: 2 INJECTION INTRAVENOUS at 06:04

## 2019-01-01 RX ADMIN — CALCIUM CHLORIDE 1 G: 100 INJECTION, SOLUTION INTRAVENOUS at 12:03

## 2019-01-01 RX ADMIN — PANTOPRAZOLE SODIUM 40 MG: 40 INJECTION, POWDER, FOR SOLUTION INTRAVENOUS at 09:04

## 2019-01-01 RX ADMIN — SODIUM BICARBONATE 50 MEQ: 84 INJECTION, SOLUTION INTRAVENOUS at 09:03

## 2019-01-01 RX ADMIN — AMIODARONE HYDROCHLORIDE 150 MG: 50 INJECTION, SOLUTION INTRAVENOUS at 03:04

## 2019-01-01 RX ADMIN — CHLORHEXIDINE GLUCONATE 0.12% ORAL RINSE 15 ML: 1.2 LIQUID ORAL at 08:04

## 2019-01-01 RX ADMIN — VASOPRESSIN 0.04 UNITS/MIN: 20 INJECTION INTRAVENOUS at 01:04

## 2019-01-01 RX ADMIN — POTASSIUM CHLORIDE 20 MEQ: 200 INJECTION, SOLUTION INTRAVENOUS at 08:04

## 2019-01-01 RX ADMIN — NOREPINEPHRINE BITARTRATE 1.6 MCG/KG/MIN: 1 INJECTION, SOLUTION, CONCENTRATE INTRAVENOUS at 01:03

## 2019-01-01 RX ADMIN — POTASSIUM CHLORIDE 40 MEQ: 400 INJECTION, SOLUTION INTRAVENOUS at 09:04

## 2019-01-01 RX ADMIN — HYDROCORTISONE SODIUM SUCCINATE 100 MG: 100 INJECTION, POWDER, FOR SOLUTION INTRAMUSCULAR; INTRAVENOUS at 06:04

## 2019-01-01 RX ADMIN — CEFAZOLIN 2 G: 330 INJECTION, POWDER, FOR SOLUTION INTRAMUSCULAR; INTRAVENOUS at 04:03

## 2019-01-01 RX ADMIN — VASOPRESSIN 0.05 UNITS/MIN: 20 INJECTION INTRAVENOUS at 04:03

## 2019-01-01 RX ADMIN — NOREPINEPHRINE BITARTRATE 0.07 MCG/KG/MIN: 1 INJECTION, SOLUTION, CONCENTRATE INTRAVENOUS at 09:03

## 2019-01-01 RX ADMIN — HEPARIN SODIUM: 5000 INJECTION, SOLUTION INTRAVENOUS; SUBCUTANEOUS at 05:03

## 2019-01-01 RX ADMIN — NOREPINEPHRINE BITARTRATE 1.5 MCG/KG/MIN: 1 INJECTION, SOLUTION, CONCENTRATE INTRAVENOUS at 01:03

## 2019-01-01 RX ADMIN — FLUCONAZOLE, SODIUM CHLORIDE 400 MG: 2 INJECTION INTRAVENOUS at 02:03

## 2019-01-01 RX ADMIN — CALCIUM GLUCONATE 2 G: 98 INJECTION, SOLUTION INTRAVENOUS at 10:03

## 2019-01-01 RX ADMIN — EPINEPHRINE 0.08 MCG/KG/MIN: 1 INJECTION INTRAMUSCULAR; INTRAVENOUS; SUBCUTANEOUS at 11:03

## 2019-01-01 RX ADMIN — HYDROCORTISONE SODIUM SUCCINATE 100 MG: 100 INJECTION, POWDER, FOR SOLUTION INTRAMUSCULAR; INTRAVENOUS at 09:04

## 2019-01-01 RX ADMIN — SODIUM BICARBONATE 50 MEQ: 84 INJECTION, SOLUTION INTRAVENOUS at 06:03

## 2019-01-01 RX ADMIN — NOREPINEPHRINE BITARTRATE 32 MG: 1 INJECTION, SOLUTION, CONCENTRATE INTRAVENOUS at 09:03

## 2019-01-01 RX ADMIN — PROPOFOL 30 MCG/KG/MIN: 10 INJECTION, EMULSION INTRAVENOUS at 04:03

## 2019-01-01 RX ADMIN — SODIUM CHLORIDE: 0.9 INJECTION, SOLUTION INTRAVENOUS at 07:04

## 2019-01-01 RX ADMIN — VASOPRESSIN 0.05 UNITS/MIN: 20 INJECTION INTRAVENOUS at 05:04

## 2019-01-01 RX ADMIN — HYDROCORTISONE SODIUM SUCCINATE 100 MG: 100 INJECTION, POWDER, FOR SOLUTION INTRAMUSCULAR; INTRAVENOUS at 06:03

## 2019-01-01 RX ADMIN — MUPIROCIN 1 G: 20 OINTMENT TOPICAL at 08:03

## 2019-01-01 RX ADMIN — DEXMEDETOMIDINE HYDROCHLORIDE 1.4 MCG/KG/HR: 400 INJECTION INTRAVENOUS at 01:03

## 2019-01-01 RX ADMIN — VASOPRESSIN 0.06 UNITS/MIN: 20 INJECTION INTRAVENOUS at 10:03

## 2019-01-01 RX ADMIN — VASOPRESSIN 0.04 UNITS/MIN: 20 INJECTION INTRAVENOUS at 03:03

## 2019-01-01 RX ADMIN — HEPARIN SODIUM AND DEXTROSE 1600 UNITS/HR: 10000; 5 INJECTION INTRAVENOUS at 03:04

## 2019-01-01 RX ADMIN — CALCIUM CHLORIDE 1 G: 100 INJECTION, SOLUTION INTRAVENOUS at 03:04

## 2019-01-01 RX ADMIN — AMIODARONE HYDROCHLORIDE 1 MG/MIN: 1.8 INJECTION, SOLUTION INTRAVENOUS at 04:04

## 2019-01-01 RX ADMIN — AMIODARONE HYDROCHLORIDE 1 MG/MIN: 1.8 INJECTION, SOLUTION INTRAVENOUS at 10:04

## 2019-01-01 RX ADMIN — EPINEPHRINE 0.04 MCG/KG/MIN: 1 INJECTION INTRAMUSCULAR; INTRAVENOUS; SUBCUTANEOUS at 05:04

## 2019-01-01 RX ADMIN — VASOPRESSIN 0.06 UNITS/MIN: 20 INJECTION INTRAVENOUS at 02:03

## 2019-01-01 RX ADMIN — CALCIUM CHLORIDE 1 G: 100 INJECTION, SOLUTION INTRAVENOUS at 07:04

## 2019-01-01 RX ADMIN — VASOPRESSIN 0.05 UNITS/MIN: 20 INJECTION INTRAVENOUS at 09:03

## 2019-01-01 RX ADMIN — ALBUMIN HUMAN 12.5 G: 0.05 INJECTION, SOLUTION INTRAVENOUS at 05:03

## 2019-01-01 RX ADMIN — CLOPIDOGREL 75 MG: 75 TABLET, FILM COATED ORAL at 09:04

## 2019-01-01 RX ADMIN — CEFAZOLIN 2 G: 1 INJECTION, POWDER, FOR SOLUTION INTRAVENOUS at 02:03

## 2019-01-01 RX ADMIN — SODIUM CHLORIDE: 0.9 INJECTION, SOLUTION INTRAVENOUS at 04:03

## 2019-01-01 RX ADMIN — INDOMETHACIN 50 MEQ: 25 CAPSULE ORAL at 08:03

## 2019-01-01 RX ADMIN — MAGNESIUM SULFATE HEPTAHYDRATE 1 G: 500 INJECTION, SOLUTION INTRAMUSCULAR; INTRAVENOUS at 09:03

## 2019-01-01 RX ADMIN — LIDOCAINE HYDROCHLORIDE 200 MG: 20 INJECTION, SOLUTION EPIDURAL; INFILTRATION; INTRACAUDAL; PERINEURAL at 07:04

## 2019-01-01 RX ADMIN — PANTOPRAZOLE SODIUM 40 MG: 40 INJECTION, POWDER, FOR SOLUTION INTRAVENOUS at 08:04

## 2019-01-01 RX ADMIN — AMIODARONE HYDROCHLORIDE 0.5 MG/MIN: 1.8 INJECTION, SOLUTION INTRAVENOUS at 07:04

## 2019-01-01 RX ADMIN — SODIUM CHLORIDE: 0.9 INJECTION, SOLUTION INTRAVENOUS at 11:03

## 2019-01-01 RX ADMIN — CALCIUM GLUCONATE 2 G: 98 INJECTION, SOLUTION INTRAVENOUS at 09:03

## 2019-01-01 RX ADMIN — CALCIUM CHLORIDE 2 G: 100 INJECTION, SOLUTION INTRAVENOUS at 01:03

## 2019-01-01 RX ADMIN — DEXMEDETOMIDINE HYDROCHLORIDE 0.2 MCG/KG/HR: 400 INJECTION INTRAVENOUS at 05:03

## 2019-01-01 RX ADMIN — EPINEPHRINE 0.2 MCG/KG/MIN: 1 INJECTION INTRAMUSCULAR; INTRAVENOUS; SUBCUTANEOUS at 09:03

## 2019-01-01 RX ADMIN — CLOPIDOGREL 75 MG: 75 TABLET, FILM COATED ORAL at 08:04

## 2019-01-01 RX ADMIN — MIDAZOLAM 2 MG: 1 INJECTION INTRAMUSCULAR; INTRAVENOUS at 02:03

## 2019-01-01 RX ADMIN — MAGNESIUM SULFATE HEPTAHYDRATE 1 G: 500 INJECTION, SOLUTION INTRAMUSCULAR; INTRAVENOUS at 12:04

## 2019-01-01 RX ADMIN — HYDROCORTISONE SODIUM SUCCINATE 100 MG: 100 INJECTION, POWDER, FOR SOLUTION INTRAMUSCULAR; INTRAVENOUS at 02:04

## 2019-01-01 RX ADMIN — CALCIUM GLUCONATE 1000 MG: 94 INJECTION, SOLUTION INTRAVENOUS at 09:03

## 2019-01-01 RX ADMIN — POTASSIUM CHLORIDE, DEXTROSE MONOHYDRATE AND SODIUM CHLORIDE 5 ML/HR: 150; 5; 900 INJECTION, SOLUTION INTRAVENOUS at 09:03

## 2019-01-01 RX ADMIN — VANCOMYCIN HYDROCHLORIDE 1000 MG: 1 INJECTION, POWDER, LYOPHILIZED, FOR SOLUTION INTRAVENOUS at 09:04

## 2019-01-01 RX ADMIN — DEXTROSE 2 G: 50 INJECTION, SOLUTION INTRAVENOUS at 01:03

## 2019-01-01 RX ADMIN — EPINEPHRINE 1.4 MCG/KG/MIN: 1 INJECTION INTRAMUSCULAR; INTRAVENOUS; SUBCUTANEOUS at 08:03

## 2019-01-01 RX ADMIN — EPINEPHRINE 1 MCG/KG/MIN: 1 INJECTION INTRAMUSCULAR; INTRAVENOUS; SUBCUTANEOUS at 09:03

## 2019-01-01 RX ADMIN — SODIUM BICARBONATE 50 MEQ: 84 INJECTION, SOLUTION INTRAVENOUS at 11:03

## 2019-01-01 RX ADMIN — DEXMEDETOMIDINE HYDROCHLORIDE 1.2 MCG/KG/HR: 400 INJECTION INTRAVENOUS at 09:03

## 2019-01-01 RX ADMIN — PROPOFOL 50 MCG/KG/MIN: 10 INJECTION, EMULSION INTRAVENOUS at 02:03

## 2019-01-01 RX ADMIN — TIROFIBAN 0.15 MCG/KG/MIN: 5 INJECTION, SOLUTION INTRAVENOUS at 12:03

## 2019-01-01 RX ADMIN — LIDOCAINE HYDROCHLORIDE 2 MG/MIN: 8 INJECTION, SOLUTION INTRAVENOUS at 12:04

## 2019-01-01 RX ADMIN — CALCIUM CHLORIDE 2 G: 100 INJECTION INTRAVENOUS; INTRAVENTRICULAR at 11:03

## 2019-01-01 RX ADMIN — HEPARIN SODIUM AND DEXTROSE 1900 UNITS/HR: 10000; 5 INJECTION INTRAVENOUS at 08:03

## 2019-01-01 RX ADMIN — SODIUM CHLORIDE: 0.9 INJECTION, SOLUTION INTRAVENOUS at 10:04

## 2019-01-01 RX ADMIN — LIDOCAINE HYDROCHLORIDE 4 MG/MIN: 8 INJECTION, SOLUTION INTRAVENOUS at 01:04

## 2019-01-01 RX ADMIN — AMIODARONE HYDROCHLORIDE 150 MG: 1.5 INJECTION, SOLUTION INTRAVENOUS at 07:04

## 2019-01-01 RX ADMIN — MUPIROCIN 1 G: 20 OINTMENT TOPICAL at 09:04

## 2019-01-01 RX ADMIN — MUPIROCIN 1 G: 20 OINTMENT TOPICAL at 09:03

## 2019-01-01 RX ADMIN — HYDROCORTISONE SODIUM SUCCINATE 100 MG: 100 INJECTION, POWDER, FOR SOLUTION INTRAMUSCULAR; INTRAVENOUS at 05:04

## 2019-01-01 RX ADMIN — ATORVASTATIN CALCIUM 40 MG: 20 TABLET, FILM COATED ORAL at 09:03

## 2019-01-01 RX ADMIN — DEXMEDETOMIDINE HYDROCHLORIDE 0.2 MCG/KG/HR: 100 INJECTION, SOLUTION, CONCENTRATE INTRAVENOUS at 08:03

## 2019-01-01 RX ADMIN — CALCIUM CHLORIDE 1 G: 100 INJECTION, SOLUTION INTRAVENOUS at 06:03

## 2019-01-01 RX ADMIN — MAGNESIUM SULFATE IN WATER 2 G: 40 INJECTION, SOLUTION INTRAVENOUS at 03:04

## 2019-01-01 RX ADMIN — HYDROCORTISONE SODIUM SUCCINATE 100 MG: 100 INJECTION, POWDER, FOR SOLUTION INTRAMUSCULAR; INTRAVENOUS at 01:03

## 2019-01-01 RX ADMIN — POTASSIUM CHLORIDE 40 MEQ: 400 INJECTION, SOLUTION INTRAVENOUS at 03:04

## 2019-01-01 RX ADMIN — SODIUM BICARBONATE: 84 INJECTION, SOLUTION INTRAVENOUS at 09:03

## 2019-01-01 RX ADMIN — ACETYLCYSTEINE 5000 MG: 200 INJECTION INTRAVENOUS at 05:03

## 2019-01-01 RX ADMIN — CALCIUM GLUCONATE 2 G: 98 INJECTION, SOLUTION INTRAVENOUS at 05:03

## 2019-01-01 RX ADMIN — HYDROCORTISONE SODIUM SUCCINATE 100 MG: 100 INJECTION, POWDER, FOR SOLUTION INTRAMUSCULAR; INTRAVENOUS at 04:03

## 2019-01-01 RX ADMIN — HYDROCORTISONE SODIUM SUCCINATE 100 MG: 100 INJECTION, POWDER, FOR SOLUTION INTRAMUSCULAR; INTRAVENOUS at 01:04

## 2019-01-01 RX ADMIN — VASOPRESSIN 0.05 UNITS/MIN: 20 INJECTION INTRAVENOUS at 02:04

## 2019-01-01 RX ADMIN — MORPHINE SULFATE 1 MG: 2 INJECTION, SOLUTION INTRAMUSCULAR; INTRAVENOUS at 05:04

## 2019-01-01 RX ADMIN — ALBUMIN HUMAN 12.5 G: 50 SOLUTION INTRAVENOUS at 05:03

## 2019-01-01 RX ADMIN — EPINEPHRINE 0.06 MCG/KG/MIN: 1 INJECTION INTRAMUSCULAR; INTRAVENOUS; SUBCUTANEOUS at 04:03

## 2019-01-01 RX ADMIN — VASOPRESSIN 0.04 UNITS/MIN: 20 INJECTION INTRAVENOUS at 04:03

## 2019-01-01 RX ADMIN — SODIUM CHLORIDE: 0.9 INJECTION, SOLUTION INTRAVENOUS at 02:03

## 2019-01-01 RX ADMIN — HEPARIN SODIUM AND DEXTROSE 200 UNITS/HR: 10000; 5 INJECTION INTRAVENOUS at 10:03

## 2019-01-01 RX ADMIN — ETOMIDATE 4 MG: 2 INJECTION, SOLUTION INTRAVENOUS at 02:03

## 2019-01-01 RX ADMIN — CALCIUM CHLORIDE 2 G: 100 INJECTION, SOLUTION INTRAVENOUS at 04:03

## 2019-01-01 RX ADMIN — AMIODARONE HYDROCHLORIDE 0.5 MG/MIN: 1.8 INJECTION, SOLUTION INTRAVENOUS at 06:04

## 2019-01-01 RX ADMIN — MAGNESIUM SULFATE IN WATER 2 G: 40 INJECTION, SOLUTION INTRAVENOUS at 07:04

## 2019-01-01 RX ADMIN — ETOMIDATE 8 MG: 2 INJECTION, SOLUTION INTRAVENOUS at 02:03

## 2019-01-01 RX ADMIN — MAGNESIUM SULFATE IN WATER 2 G: 40 INJECTION, SOLUTION INTRAVENOUS at 04:04

## 2019-01-01 RX ADMIN — MUPIROCIN 1 G: 20 OINTMENT TOPICAL at 08:04

## 2019-01-01 RX ADMIN — CALCIUM CHLORIDE 2 G: 100 INJECTION, SOLUTION INTRAVENOUS at 08:03

## 2019-01-01 RX ADMIN — CALCIUM CHLORIDE 1 G: 100 INJECTION, SOLUTION INTRAVENOUS at 03:03

## 2019-01-01 RX ADMIN — INDOMETHACIN 50 MEQ: 25 CAPSULE ORAL at 11:03

## 2019-01-01 RX ADMIN — CALCIUM GLUCONATE 2 G: 98 INJECTION, SOLUTION INTRAVENOUS at 01:04

## 2019-01-01 RX ADMIN — MAGNESIUM SULFATE 2 G: 2 INJECTION INTRAVENOUS at 11:04

## 2019-01-01 RX ADMIN — ASPIRIN 81 MG CHEWABLE TABLET 81 MG: 81 TABLET CHEWABLE at 09:03

## 2019-01-01 RX ADMIN — Medication 0.02 MCG/KG/MIN: at 12:03

## 2019-01-01 RX ADMIN — PROPOFOL 10 MG: 10 INJECTION, EMULSION INTRAVENOUS at 03:03

## 2019-01-01 RX ADMIN — SODIUM BICARBONATE 50 MEQ: 84 INJECTION, SOLUTION INTRAVENOUS at 08:03

## 2019-01-01 RX ADMIN — NOREPINEPHRINE BITARTRATE 0.09 MCG/KG/MIN: 1 INJECTION, SOLUTION, CONCENTRATE INTRAVENOUS at 02:03

## 2019-01-01 RX ADMIN — PROPOFOL 35 MCG/KG/MIN: 10 INJECTION, EMULSION INTRAVENOUS at 06:03

## 2019-01-01 RX ADMIN — CHLORHEXIDINE GLUCONATE 0.12% ORAL RINSE 15 ML: 1.2 LIQUID ORAL at 09:04

## 2019-01-01 RX ADMIN — MAGNESIUM SULFATE IN WATER 2 G: 40 INJECTION, SOLUTION INTRAVENOUS at 06:04

## 2019-01-01 RX ADMIN — ALBUMIN HUMAN 25 G: 0.25 SOLUTION INTRAVENOUS at 11:04

## 2019-01-01 RX ADMIN — CALCIUM GLUCONATE 2 G: 98 INJECTION, SOLUTION INTRAVENOUS at 06:03

## 2019-01-01 RX ADMIN — SODIUM GLYCOLATE 40 MMOL: 216 INJECTION, SOLUTION INTRAVENOUS at 08:04

## 2019-01-01 RX ADMIN — VASOPRESSIN 0.06 UNITS/MIN: 20 INJECTION INTRAVENOUS at 08:03

## 2019-01-01 RX ADMIN — AMIODARONE HYDROCHLORIDE 150 MG: 1.5 INJECTION, SOLUTION INTRAVENOUS at 06:04

## 2019-01-01 RX ADMIN — RIFAXIMIN 550 MG: 550 TABLET ORAL at 09:04

## 2019-01-01 RX ADMIN — PROPOFOL 50 MCG/KG/MIN: 10 INJECTION, EMULSION INTRAVENOUS at 08:03

## 2019-01-01 RX ADMIN — HEPARIN SODIUM AND DEXTROSE 1000 UNITS/HR: 10000; 5 INJECTION INTRAVENOUS at 05:04

## 2019-01-01 RX ADMIN — TICAGRELOR 90 MG: 90 TABLET ORAL at 09:03

## 2019-01-01 RX ADMIN — VANCOMYCIN HYDROCHLORIDE 1500 MG: 100 INJECTION, POWDER, LYOPHILIZED, FOR SOLUTION INTRAVENOUS at 01:03

## 2019-01-01 RX ADMIN — CALCIUM GLUCONATE 2 G: 98 INJECTION, SOLUTION INTRAVENOUS at 01:03

## 2019-01-01 RX ADMIN — NICARDIPINE HYDROCHLORIDE 2.5 MG/HR: 0.2 INJECTION, SOLUTION INTRAVENOUS at 10:04

## 2019-01-01 RX ADMIN — NOREPINEPHRINE BITARTRATE 0.06 MCG/KG/MIN: 1 INJECTION, SOLUTION, CONCENTRATE INTRAVENOUS at 01:03

## 2019-01-01 RX ADMIN — ROCURONIUM BROMIDE 30 MG: 10 INJECTION, SOLUTION INTRAVENOUS at 03:03

## 2019-01-01 RX ADMIN — TIROFIBAN 0.15 MCG/KG/MIN: 5 INJECTION, SOLUTION INTRAVENOUS at 05:03

## 2019-01-01 RX ADMIN — PROPOFOL 10 MCG/KG/MIN: 10 INJECTION, EMULSION INTRAVENOUS at 03:03

## 2019-01-01 RX ADMIN — ROCURONIUM BROMIDE 30 MG: 10 INJECTION, SOLUTION INTRAVENOUS at 02:03

## 2019-01-01 RX ADMIN — HEPARIN SODIUM AND DEXTROSE 1100 UNITS/HR: 10000; 5 INJECTION INTRAVENOUS at 04:04

## 2019-01-01 RX ADMIN — EPINEPHRINE 1 MCG/KG/MIN: 1 INJECTION INTRAMUSCULAR; INTRAVENOUS; SUBCUTANEOUS at 10:03

## 2019-01-01 RX ADMIN — VASOPRESSIN 0.04 UNITS/MIN: 20 INJECTION INTRAVENOUS at 08:03

## 2019-01-01 RX ADMIN — Medication: at 01:03

## 2019-01-01 RX ADMIN — NICARDIPINE HYDROCHLORIDE 2.5 MG/HR: 0.2 INJECTION INTRAVENOUS at 10:04

## 2019-01-01 RX ADMIN — ALBUMIN HUMAN 25 G: 0.05 INJECTION, SOLUTION INTRAVENOUS at 08:03

## 2019-01-01 RX ADMIN — POLYETHYLENE GLYCOL 3350 17 G: 17 POWDER, FOR SOLUTION ORAL at 09:04

## 2019-01-01 RX ADMIN — HEPARIN SODIUM AND DEXTROSE 1400 UNITS/HR: 10000; 5 INJECTION INTRAVENOUS at 10:03

## 2019-01-01 RX ADMIN — MAGNESIUM SULFATE 2 G: 2 INJECTION INTRAVENOUS at 05:04

## 2019-01-01 RX ADMIN — EPINEPHRINE 0.06 MCG/KG/MIN: 1 INJECTION INTRAMUSCULAR; INTRAVENOUS; SUBCUTANEOUS at 03:04

## 2019-01-01 RX ADMIN — HYDROCORTISONE SODIUM SUCCINATE 100 MG: 100 INJECTION, POWDER, FOR SOLUTION INTRAMUSCULAR; INTRAVENOUS at 10:03

## 2019-01-01 RX ADMIN — TICAGRELOR 180 MG: 90 TABLET ORAL at 12:03

## 2019-01-01 RX ADMIN — CALCIUM CHLORIDE 1 G: 100 INJECTION, SOLUTION INTRAVENOUS at 08:03

## 2019-01-01 RX ADMIN — HYDROCORTISONE SODIUM SUCCINATE 100 MG: 100 INJECTION, POWDER, FOR SOLUTION INTRAMUSCULAR; INTRAVENOUS at 11:03

## 2019-01-01 RX ADMIN — SODIUM CHLORIDE, SODIUM GLUCONATE, SODIUM ACETATE, POTASSIUM CHLORIDE, MAGNESIUM CHLORIDE, SODIUM PHOSPHATE, DIBASIC, AND POTASSIUM PHOSPHATE: .53; .5; .37; .037; .03; .012; .00082 INJECTION, SOLUTION INTRAVENOUS at 12:03

## 2019-01-01 RX ADMIN — POTASSIUM CHLORIDE 40 MEQ: 400 INJECTION, SOLUTION INTRAVENOUS at 05:04

## 2019-01-01 RX ADMIN — HEPARIN SODIUM AND DEXTROSE 1400 UNITS/HR: 10000; 5 INJECTION INTRAVENOUS at 01:04

## 2019-01-01 RX ADMIN — NOREPINEPHRINE BITARTRATE 1.2 MCG/KG/MIN: 1 INJECTION, SOLUTION, CONCENTRATE INTRAVENOUS at 12:03

## 2019-01-01 RX ADMIN — CALCIUM CHLORIDE 3 G: 100 INJECTION, SOLUTION INTRAVENOUS at 02:03

## 2019-01-01 RX ADMIN — FENTANYL CITRATE 100 MCG: 50 INJECTION, SOLUTION INTRAMUSCULAR; INTRAVENOUS at 01:03

## 2019-01-01 RX ADMIN — LIDOCAINE HYDROCHLORIDE 100 MG: 20 INJECTION INTRAVENOUS at 06:04

## 2019-01-01 RX ADMIN — LORAZEPAM 0.5 MG: 2 INJECTION, SOLUTION INTRAMUSCULAR; INTRAVENOUS at 05:04

## 2019-01-01 RX ADMIN — CEFAZOLIN 2 G: 330 INJECTION, POWDER, FOR SOLUTION INTRAMUSCULAR; INTRAVENOUS at 05:03

## 2019-01-01 RX ADMIN — SODIUM GLYCOLATE 40 MMOL: 216 INJECTION, SOLUTION INTRAVENOUS at 05:04

## 2019-01-01 RX ADMIN — ACETYLCYSTEINE 15000 MG: 200 INJECTION INTRAVENOUS at 04:03

## 2019-01-01 RX ADMIN — MINERAL OIL AND WHITE PETROLATUM: 150; 830 OINTMENT OPHTHALMIC at 04:04

## 2019-01-01 RX ADMIN — AMIODARONE HYDROCHLORIDE 150 MG: 50 INJECTION, SOLUTION INTRAVENOUS at 07:04

## 2019-01-01 RX ADMIN — PANTOPRAZOLE SODIUM 40 MG: 40 INJECTION, POWDER, LYOPHILIZED, FOR SOLUTION INTRAVENOUS at 09:03

## 2019-01-01 RX ADMIN — FLUCONAZOLE, SODIUM CHLORIDE 400 MG: 2 INJECTION INTRAVENOUS at 01:04

## 2019-01-01 RX ADMIN — INDOMETHACIN 50 MEQ: 25 CAPSULE ORAL at 06:03

## 2019-01-01 RX ADMIN — CALCIUM GLUCONATE 2 G: 98 INJECTION, SOLUTION INTRAVENOUS at 08:03

## 2019-01-01 RX ADMIN — SODIUM CHLORIDE: 0.9 INJECTION, SOLUTION INTRAVENOUS at 06:03

## 2019-01-01 RX ADMIN — ALBUMIN HUMAN 25 G: 0.05 INJECTION, SOLUTION INTRAVENOUS at 01:03

## 2019-01-01 RX ADMIN — HEPARIN SODIUM: 5000 INJECTION, SOLUTION INTRAVENOUS; SUBCUTANEOUS at 06:03

## 2019-01-01 RX ADMIN — LIDOCAINE HYDROCHLORIDE 1 MG/MIN: 8 INJECTION, SOLUTION INTRAVENOUS at 08:03

## 2019-01-01 RX ADMIN — CALCIUM GLUCONATE 2 G: 98 INJECTION, SOLUTION INTRAVENOUS at 11:03

## 2019-01-01 RX ADMIN — CALCIUM GLUCONATE 2 G: 98 INJECTION, SOLUTION INTRAVENOUS at 04:03

## 2019-01-01 RX ADMIN — AMIODARONE HYDROCHLORIDE 1 MG/MIN: 1.8 INJECTION, SOLUTION INTRAVENOUS at 07:04

## 2019-01-01 RX ADMIN — SODIUM CHLORIDE: 0.9 INJECTION, SOLUTION INTRAVENOUS at 03:04

## 2019-01-01 RX ADMIN — EPINEPHRINE 0.5 MG: 1 INJECTION, SOLUTION INTRAMUSCULAR; SUBCUTANEOUS at 01:03

## 2019-01-01 RX ADMIN — FLUCONAZOLE, SODIUM CHLORIDE 400 MG: 2 INJECTION INTRAVENOUS at 02:04

## 2019-01-01 RX ADMIN — LIDOCAINE HYDROCHLORIDE 4 MG/MIN: 8 INJECTION, SOLUTION INTRAVENOUS at 11:04

## 2019-01-01 RX ADMIN — VASOPRESSIN 0.05 UNITS/MIN: 20 INJECTION INTRAVENOUS at 08:04

## 2019-01-01 RX ADMIN — CLOPIDOGREL 75 MG: 75 TABLET, FILM COATED ORAL at 09:03

## 2019-01-01 RX ADMIN — ACETYLCYSTEINE 10000 MG: 200 INJECTION INTRAVENOUS at 09:03

## 2019-01-01 RX ADMIN — VANCOMYCIN HYDROCHLORIDE 1500 MG: 100 INJECTION, POWDER, LYOPHILIZED, FOR SOLUTION INTRAVENOUS at 01:04

## 2019-01-01 RX ADMIN — PROPOFOL 45 MCG/KG/MIN: 10 INJECTION, EMULSION INTRAVENOUS at 01:03

## 2019-01-01 RX ADMIN — LIDOCAINE HYDROCHLORIDE 100 MG: 20 INJECTION, SOLUTION INFILTRATION; PERINEURAL at 03:04

## 2019-01-01 RX ADMIN — HYDROCORTISONE SODIUM SUCCINATE 100 MG: 100 INJECTION, POWDER, FOR SOLUTION INTRAMUSCULAR; INTRAVENOUS at 05:03

## 2019-01-01 RX ADMIN — SODIUM BICARBONATE 50 MEQ: 84 INJECTION, SOLUTION INTRAVENOUS at 01:03

## 2019-01-01 RX ADMIN — CISATRACURIUM BESYLATE 2 MCG/KG/MIN: 10 INJECTION INTRAVENOUS at 07:03

## 2019-01-01 RX ADMIN — EPINEPHRINE 0.04 MCG/KG/MIN: 1 INJECTION INTRAMUSCULAR; INTRAVENOUS; SUBCUTANEOUS at 06:03

## 2019-01-01 RX ADMIN — MAGNESIUM SULFATE HEPTAHYDRATE 2 G: 40 INJECTION, SOLUTION INTRAVENOUS at 07:04

## 2019-01-01 RX ADMIN — POTASSIUM CHLORIDE 40 MEQ: 400 INJECTION, SOLUTION INTRAVENOUS at 12:04

## 2019-01-01 RX ADMIN — POTASSIUM CHLORIDE 20 MEQ: 200 INJECTION, SOLUTION INTRAVENOUS at 11:04

## 2019-01-01 RX ADMIN — MAGNESIUM SULFATE IN WATER 2 G: 40 INJECTION, SOLUTION INTRAVENOUS at 11:04

## 2019-01-01 RX ADMIN — ASPIRIN 81 MG CHEWABLE TABLET 81 MG: 81 TABLET CHEWABLE at 08:04

## 2019-01-01 RX ADMIN — PANTOPRAZOLE SODIUM 40 MG: 40 INJECTION, POWDER, LYOPHILIZED, FOR SOLUTION INTRAVENOUS at 12:03

## 2019-01-01 RX ADMIN — SUCCINYLCHOLINE CHLORIDE 150 MG: 20 INJECTION, SOLUTION INTRAMUSCULAR; INTRAVENOUS at 02:03

## 2019-01-01 RX ADMIN — CEFAZOLIN 2 G: 330 INJECTION, POWDER, FOR SOLUTION INTRAMUSCULAR; INTRAVENOUS at 09:03

## 2019-01-01 RX ADMIN — CALCIUM CHLORIDE 2 G: 100 INJECTION, SOLUTION INTRAVENOUS at 02:03

## 2019-01-01 RX ADMIN — FLUCONAZOLE, SODIUM CHLORIDE 400 MG: 2 INJECTION INTRAVENOUS at 03:04

## 2019-01-01 RX ADMIN — SODIUM CHLORIDE: 0.9 INJECTION, SOLUTION INTRAVENOUS at 01:04

## 2019-01-01 RX ADMIN — PROPOFOL 45 MCG/KG/MIN: 10 INJECTION, EMULSION INTRAVENOUS at 12:04

## 2019-01-01 RX ADMIN — CALCIUM CHLORIDE 1 G: 100 INJECTION, SOLUTION INTRAVENOUS at 12:04

## 2019-01-01 RX ADMIN — PROPOFOL 50 MCG/KG/MIN: 10 INJECTION, EMULSION INTRAVENOUS at 11:03

## 2019-01-01 RX ADMIN — VANCOMYCIN HYDROCHLORIDE 750 MG: 750 INJECTION, POWDER, LYOPHILIZED, FOR SOLUTION INTRAVENOUS at 12:03

## 2019-01-01 RX ADMIN — PROPOFOL 30 MCG/KG/MIN: 10 INJECTION, EMULSION INTRAVENOUS at 10:03

## 2019-01-01 RX ADMIN — ALBUMIN HUMAN 25 G: 0.25 SOLUTION INTRAVENOUS at 09:03

## 2019-01-01 RX ADMIN — PROPOFOL 40 MCG/KG/MIN: 10 INJECTION, EMULSION INTRAVENOUS at 11:03

## 2019-01-01 RX ADMIN — ASPIRIN 81 MG CHEWABLE TABLET 81 MG: 81 TABLET CHEWABLE at 09:04

## 2019-01-01 RX ADMIN — VASOPRESSIN 0.06 UNITS/MIN: 20 INJECTION INTRAVENOUS at 09:03

## 2019-01-01 RX ADMIN — EPINEPHRINE 0.06 MCG/KG/MIN: 1 INJECTION INTRAMUSCULAR; INTRAVENOUS; SUBCUTANEOUS at 10:03

## 2019-01-01 RX ADMIN — HYDROCORTISONE SODIUM SUCCINATE 100 MG: 100 INJECTION, POWDER, FOR SOLUTION INTRAMUSCULAR; INTRAVENOUS at 09:03

## 2019-01-01 RX ADMIN — ALBUMIN HUMAN 12.5 G: 0.05 INJECTION, SOLUTION INTRAVENOUS at 12:03

## 2019-01-01 RX ADMIN — EPINEPHRINE 0.06 MCG/KG/MIN: 1 INJECTION INTRAMUSCULAR; INTRAVENOUS; SUBCUTANEOUS at 12:04

## 2019-01-01 RX ADMIN — VASOPRESSIN 0.02 UNITS/MIN: 20 INJECTION INTRAVENOUS at 01:03

## 2019-01-01 RX ADMIN — PROPOFOL 35 MCG/KG/MIN: 10 INJECTION, EMULSION INTRAVENOUS at 04:03

## 2019-01-01 RX ADMIN — INDOMETHACIN 50 MEQ: 25 CAPSULE ORAL at 01:03

## 2019-01-01 RX ADMIN — LIDOCAINE HYDROCHLORIDE 100 MG: 20 INJECTION, SOLUTION INTRAVENOUS at 12:04

## 2019-01-01 RX ADMIN — PROPOFOL 45 MCG/KG/MIN: 10 INJECTION, EMULSION INTRAVENOUS at 09:03

## 2019-01-01 RX ADMIN — MAGNESIUM SULFATE 2 G: 2 INJECTION INTRAVENOUS at 07:04

## 2019-01-01 RX ADMIN — CISATRACURIUM BESYLATE 5 MCG/KG/MIN: 10 INJECTION INTRAVENOUS at 09:03

## 2019-01-01 RX ADMIN — EPINEPHRINE 0.7 MCG/KG/MIN: 1 INJECTION INTRAMUSCULAR; INTRAVENOUS; SUBCUTANEOUS at 11:03

## 2019-01-01 RX ADMIN — NOREPINEPHRINE BITARTRATE 0.04 MG: 1 INJECTION, SOLUTION, CONCENTRATE INTRAVENOUS at 11:03

## 2019-01-01 RX ADMIN — NOREPINEPHRINE BITARTRATE 0.8 MCG/KG/MIN: 1 INJECTION, SOLUTION, CONCENTRATE INTRAVENOUS at 08:03

## 2019-01-01 RX ADMIN — POLYETHYLENE GLYCOL 3350 17 G: 17 POWDER, FOR SOLUTION ORAL at 08:04

## 2019-01-01 RX ADMIN — NOREPINEPHRINE BITARTRATE 32 MG: 1 INJECTION, SOLUTION INTRAVENOUS at 09:03

## 2019-03-29 PROBLEM — R07.9 CHEST PAIN: Status: ACTIVE | Noted: 2019-01-01

## 2019-03-29 PROBLEM — I21.9 ACUTE MI: Status: ACTIVE | Noted: 2019-01-01

## 2019-03-29 PROBLEM — R57.0 CARDIOGENIC SHOCK: Status: ACTIVE | Noted: 2019-01-01

## 2019-03-29 PROBLEM — I21.3 ST ELEVATION MYOCARDIAL INFARCTION (STEMI): Status: ACTIVE | Noted: 2019-01-01

## 2019-03-29 PROBLEM — Z99.11 ON MECHANICALLY ASSISTED VENTILATION: Status: ACTIVE | Noted: 2019-01-01

## 2019-03-29 PROBLEM — R79.89 LFT ELEVATION: Status: ACTIVE | Noted: 2019-01-01

## 2019-03-29 PROBLEM — N17.9 AKI (ACUTE KIDNEY INJURY): Status: ACTIVE | Noted: 2019-01-01

## 2019-03-29 NOTE — Clinical Note
dry, intact, no bleeding and no hematoma. Perclosed to RCFA, gauze and tegaderm applied, hemostasis achieved. Impella to L CFA. Site covered with central line dressing

## 2019-03-29 NOTE — NURSING
Pt arrived from cath lab with left femoral impella and unable to obtain pedal pulses to left leg. Interventional cards MD and MD Hamlin aware of inability to obtain pulses. Warming blanket in place, no new orders at this time. Will continue to monitor.     Pt intubated and sats remain 88-89%. MD Hamlin aware. ABG obtained and no new orders at this time. Will continue to monitor.

## 2019-03-29 NOTE — ANESTHESIA PREPROCEDURE EVALUATION
03/29/2019  Subhash Ac is a 43 y.o., male. Presenting with STEMI and acute hypoxic respiratory failure 2/2 pulmonary edema in the setting of cardiogenic shock    Anesthesia Evaluation    I have reviewed the Patient Summary Reports.    I have reviewed the Nursing Notes.   I have reviewed the Medications.     Review of Systems         Anesthesia Plan  Type of Anesthesia, risks & benefits discussed:  Anesthesia Type:  general  Patient's Preference:   Intra-op Monitoring Plan: arterial line and standard ASA monitors  Intra-op Monitoring Plan Comments:   Post Op Pain Control Plan: multimodal analgesia  Post Op Pain Control Plan Comments:   Induction:   IV  Beta Blocker:         Informed Consent:    ASA Score: 5  emergent   Day of Surgery Review of History & Physical:            Ready For Surgery From Anesthesia Perspective.

## 2019-03-29 NOTE — PLAN OF CARE
PT INTUBATED WITH NO FAMILY AT BS. WILL ATTEMPT LATER       03/29/19 1443   Discharge Assessment   Assessment Type Discharge Planning Assessment   Assessment information obtained from? Medical Record

## 2019-03-29 NOTE — PROGRESS NOTES
Pt received from cath lab via [facility], he has been placed on vent pending MD orders. Abg being obtained.

## 2019-03-29 NOTE — Clinical Note
The sheath is inserted into the right internal jugular vein. Protek-Dung 31 FR cannula inserted Right IJ and then repositioned to the PA.

## 2019-03-29 NOTE — NURSING TRANSFER
Nursing Transfer Note      3/29/2019     [facility]    Transfer via bed    Transfer with  cardiac monitoring, portable ventilator with nitric @ 10 ppm    Transported by [facility] RN x 3, Cath lab RN x 2, RT x 1    Medicines sent: Epinephrine, Norepinephrine, Vasopressin, Sodium Bicarb, Propofol, Precedex, and Impella purge infusions    Chart send with patient: Yes    Notified: spouse, mother, and friends of family escorted to cath lab waiting area    Upon arrival to floor: Patient assisted to cath lab table with continuous cardiac monitoring.  Unable to orient patient to new area due to sedation.
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
## 2019-03-29 NOTE — Clinical Note
Protek-Dung 31 FR cannula inserted Right IJ and then repositioned to the PA. Secured in Place in PA and sutured in Right IJ.
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
## 2019-03-29 NOTE — ASSESSMENT & PLAN NOTE
Rate 20, Vt 420, PEEP 15, FiO2 100  ABG 7.13/50/169/17, will reduce PEEP to 10 and FiO2 to 90%, repeat in one hour and continue to make adjustments

## 2019-03-29 NOTE — PROCEDURES
"Subhash Ac is a 43 y.o. male patient.    Temp: 97 °F (36.1 °C) (19)  Pulse: (!) 116 (19)  Resp: (!) 22 (19)  BP: (!) 75/51 (198)  SpO2: (!) 82 % (19)  Weight: 104.3 kg (230 lb) (19)  Height: 5' 9" (175.3 cm) (19)       Arterial Line  Date/Time: 3/29/2019 6:11 AM  Location procedure was performed: [facility] ([facility])  Performed by: Cassius Hamlin MD  Authorized by: Cassius Hamlin MD   Pre-op Diagnosis: Cardiogenic shock  Post-operative diagnosis: same  Consent Done: Yes  Consent: Verbal consent obtained. Written consent obtained.  Risks and benefits: risks, benefits and alternatives were discussed  Consent given by: spouse  Patient identity confirmed: , MRN and name  Time out: Immediately prior to procedure a "time out" was called to verify the correct patient, procedure, equipment, support staff and site/side marked as required.  Indications: multiple ABGs, respiratory failure and hemodynamic monitoring  Location: right radial  Number of attempts: 1  Complications: No  Estimated blood loss (mL): 2  Post-procedure: line sutured          Cassius Hamlin  3/29/2019  "
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
## 2019-03-29 NOTE — HPI
This is a 43 year-old gentleman who presented to the ER with a STEMI.  He was loaded with Brilinta and taken to the cath lab where he underwent a left main stent.  Post procedure FABIENNE 2 flow.  He also has a Impella 3.5 via left CFA access.  He is currently on a tirofiban (Aggrastat) drip.  His MAP is currently in the high 80s with about 2.7 or 2.8L of flow.  He was on as much as 0.15 norepinephrine upon arrival to the [facility], but has now been weaned to 0.06.  His CXR shows bilateral fluffy infiltrates.  He was just increased from PEEP 5/100% to PEEP 10/100% on the ventilator.  He had an ABG just before that change showing 7.116/49/72/-14/16/88%.  Initial Cr was 1.8.  Anesthesia pushed sedation and paralytic medications upon arrival to the CMICU just before my arrival.  In my discussion with the interventional cardiology fellow his neuro status was normal in the the cath lab.

## 2019-03-29 NOTE — HPI
Mr. Subhash Ac is a 44 yo male who was admitted ton 3/29 with chief complaint of CP and SOB, found to have STEMI.  He was urgently taken to the cath lab and found to have 100% thrombotic occlusion to the LM, now s/p PCI with RAQUEL with dilation.  An Impella was placed and patient was transferred to ICU for continued management of his cardiogenic shock.  Pressor requirements have continued to increase with rise in lactic acid overnight with continued decline of kidney function to the point of anuria.  Bicarb trending down to 12, ABG with acidemia with pH of 7.1.  Vent settings were adjusted and he was started on a bicarbonate gtt with improvement in his pH to 7.2.  He continues to require Epi/Levo/Vaso for BP support, yet remains hypotensive.  CTS and interventional cardiology both consulted for further help with perfusion, currently being evaluated for Tandem placement either via CT surgery vs. Cath lab.  Nephrology has been consulted for JACOBY in setting of cardiogenic shock.

## 2019-03-29 NOTE — PROCEDURES
"Subhash Ac is a 43 y.o. male patient.    Temp: 97 °F (36.1 °C) (03/29/19 0039)  Pulse: (!) 116 (03/29/19 0551)  Resp: (!) 22 (03/29/19 0551)  BP: (!) 75/51 (03/29/19 0058)  SpO2: (!) 82 % (03/29/19 0551)  Weight: 104.3 kg (230 lb) (03/29/19 0039)  Height: 5' 9" (175.3 cm) (03/29/19 0039)       Central Line  Date/Time: 3/29/2019 6:09 AM  Location procedure was performed: [facility] ([facility])  Performed by: Cassius Hamlin MD  Supervising provider: Harley Samuel MD  Pre-operative Diagnosis: Cardiogenic shock  Post-operative diagnosis: same  Consent Done: Yes  Time out: Immediately prior to procedure a "time out" was called to verify the correct patient, procedure, equipment, support staff and site/side marked as required.  Indications: vascular access and hemodynamic monitoring  Anesthesia: local infiltration    Anesthesia:  Local Anesthetic: lidocaine 1% without epinephrine  Anesthetic total: 3 mL  Preparation: skin prepped with ChloraPrep  Skin prep agent dried: skin prep agent completely dried prior to procedure  Sterile barriers: all five maximum sterile barriers used - cap, mask, sterile gown, sterile gloves, and large sterile sheet  Hand hygiene: hand hygiene performed prior to central venous catheter insertion  Location details: right internal jugular  Catheter type: triple lumen  Catheter size: 7 Fr  Catheter Length: 16cm    Ultrasound guidance: yes  Vessel Caliber: medium, patent, compressibility normal  Needle advanced into vessel with real time Ultrasound guidance.  Guidewire confirmed in vessel.  Sterile sheath used.  Number of attempts: 1  Assessment: placement verified by x-ray  Complications: none  Post-procedure: line sutured,  chlorhexidine patch,  sterile dressing applied and blood return through all ports  Complications: No          Cassius Hamlin  3/29/2019  "

## 2019-03-29 NOTE — ASSESSMENT & PLAN NOTE
100% thrombotic occlusion of left main  S/p PCI of LM w/ RAQUEL postdilated w/ 4.0x8 NC balloon with FABIENNE 2 flow in all vessels  Admit to CCU  DAPT with ASA 81 mg, ticagrelor 90 mg BID, tirofiban 0.15 mcg/kg/min x 12 hours, high intensity statin  Holding BB/ACE in the setting of cardiogenic shock

## 2019-03-29 NOTE — PLAN OF CARE
Problem: Adult Inpatient Plan of Care  Goal: Plan of Care Review  Outcome: Ongoing (interventions implemented as appropriate)   Pt. Intubated/sedated. On propofol, precedex, levophed, epi and aggrostat. Impella 3.5 on P5 to right fem. SVO2 57 No urine output ( MD aware ),Nitric oxide at 10ppm. Frequent vital and assessment per flowsheet, plan of care reviewed with pt.'s wife, questions/concerns addressed, will continue to monitor pt.

## 2019-03-29 NOTE — HPI
43 year old male admitted with STEMI and cardiogenic shock. He was recently  one week ago and history is provided by his wife. Within the past week the patient had been complaining of multiple pains in the chest, back and shoulders which would alternate in location. In recent days he had become less physically active and increasingly fatigued. On the evening of admission, the patient did not feel well and went to lay down. He woke up his wife at 10 pm, was diaphoretic, in a panic, and complaining of severe chest pain (similar to the pains he had experienced during the past week). The patient was brought to the ER and taken to the cath lab for STEMI. He quit smoking 2 weeks ago and his wife is uncertain about any family history of cardiac disease. The patient had not previously complained of dyspnea, orthopnea, PND, peripheral edema or syncope.
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
## 2019-03-29 NOTE — PROGRESS NOTES
03/29/2019  Ajith Fleming    Current provider:  Satish Denise MD      I, Ajith Fleming, rounded on Subhash Ac to ensure all mechanical assist device settings (IABP or VAD) were appropriate and all parameters were within limits.  I was able to ensure all back up equipment was present, the staff had no issues, and the Perfusion Department daily rounding was complete.    12:22 PM

## 2019-03-29 NOTE — ASSESSMENT & PLAN NOTE
Central line placed  Art line placed  Check VBG/CVP  2D echo  Impella via left CFA, on norepinephrine 0.15, Epinephrine 0.04
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
## 2019-03-29 NOTE — PROCEDURES
"    Post Cath Note  Referring Physician: No att. providers found  Procedure: Left heart cath (Left), Percutaneous coronary intervention (N/A), Placement, IABP, INSERTION, IMPELLA (N/A), Removal, IABP       Access: Right CFA, Left CFA  100% thrombotic occlusion of the LM    See full report for further details    Intervention:   Successful PCI of LM w/ RAQUEL postdilated w/ 4.0x8 NC balloon with FABIENNE 2 flow in all vessels  Closure device: R CFA w/ perclose, retained Impella    Post Cath Exam:   BP (!) 75/51 (BP Location: Left arm, Patient Position: Lying)   Pulse 62   Temp 97 °F (36.1 °C) (Oral)   Resp 14   Ht 5' 9" (1.753 m)   Wt 104.3 kg (230 lb)   SpO2 97%   BMI 33.97 kg/m²   No unusual pain, hematoma, thrill or bruit at vascular access site.  Distal pulse present without signs of ischemia.    Recommendations:   - Routine post-cath care  - Continue medical management, Risk factor reduction, Plavix for at least 1 year and ASA 81 mg indefinitely, CTS consult  - Tirofiban (Aggrastat) 0.15 mcg/kg/min IV x 18 hr **Decrease infusion rate 50% for CrCl <60**   - CTS consulted for eval for possible urgent transplant v. LVAD v. ECMO      Signed:  Wesly Berg MD  [facility]   3/29/2019 3:24 AM  "
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
## 2019-03-29 NOTE — TRANSFER OF CARE
"Anesthesia Transfer of Care Note    Patient: Subhash Ac    Procedure(s) Performed: Procedure(s) (LRB):  Left heart cath (Left)  Percutaneous coronary intervention (N/A)  Placement, IABP  INSERTION, IMPELLA (N/A)  Removal, IABP    Patient location: ICU    Anesthesia Type: general    Transport from OR: Transported from OR intubated on 100% O2 by AMBU with assisted ventilation. Upon arrival to PACU/ICU, patient attached to ventilator and auscultated to confirm bilateral breath sounds and adequate TV. Continuous ECG monitoring in transport. Continuous SpO2 monitoring in transport. Continuos invasive BP monitoring in transport    Post pain: adequate analgesia    Post assessment: no apparent anesthetic complications    Vital signs course: patient hypotensive throughout - uptitrated norepinephrine to achieve MAP of 65 - goal met upon departure from ICU bedside.    Level of consciousness: sedated    Nausea/Vomiting: no nausea/vomiting    Complications: none    Transfer of care protocol was followed      Last vitals:   Visit Vitals  BP (!) 75/51 (BP Location: Left arm, Patient Position: Lying)   Pulse 62   Temp 36.1 °C (97 °F) (Oral)   Resp 14   Ht 5' 9" (1.753 m)   Wt 104.3 kg (230 lb)   SpO2 97%   BMI 33.97 kg/m²     "

## 2019-03-29 NOTE — PROGRESS NOTES
[facility] 2 16 17. Progress Note  Interventional Cardiology Service    Admit Date: 3/29/2019   LOS: 0 days     SUBJECTIVE:     Follow up for: ST elevation myocardial infarction (STEMI)    Interval History:     Scheduled Meds:   aspirin  81 mg Oral Daily    atorvastatin  40 mg Oral Daily    EPINEPHrine        lidocaine (PF) 10 mg/ml (1%)        norepinephrine        sodium bicarbonate        ticagrelor  90 mg Oral BID    vasopressin         Continuous Infusions:   dexmedetomidine (PRECEDEX) infusion 1.4 mcg/kg/hr (03/29/19 1357)    epinephrine infusion (NON-TITRATING) 0.06 mcg/kg/min (03/29/19 1217)    heparin (porcine) in dextrose 5% 500 mL (IMPELLA)      heparin (porcine) 1,000 Units/hr (03/29/19 0124)    nitric oxide gas      norepinephrine bitartrate-D5W      propofol 10 mcg/kg/min (03/29/19 1431)    sodium bicarbonate drip 50 mL/hr at 03/29/19 1324    tirofiban-0.9% sodium chloride 12.5 mg/250ml 0.15 mcg/kg/min (03/29/19 0500)    tirofiban-0.9% sodium chloride 12.5 mg/250ml 0.15 mcg/kg/min (03/29/19 1206)    vasopressin (PITRESSIN) infusion 0.04 Units/min (03/29/19 1323)     PRN Meds:heparin (porcine), ondansetron, sodium chloride 0.9%, tirofiban-0.9% sodium chloride 12.5 mg/250ml    Review of patient's allergies indicates:  No Known Allergies    OBJECTIVE:     Vital Signs (Most Recent)  Temp: 100.2 °F (37.9 °C) (03/29/19 1200)  Pulse: (!) 120 (03/29/19 1400)  Resp: (!) 61 (03/29/19 1400)  BP: (!) 87/70 (03/29/19 1000)  SpO2: (!) 77 % (03/29/19 1400)    Vital Signs Range (Last 24H):  Temp:  [97 °F (36.1 °C)-100.2 °F (37.9 °C)]   Pulse:  []   Resp:  [14-69]   BP: ()/(38-70)   SpO2:  [66 %-98 %]   Arterial Line BP: (60-88)/(48-69)     I & O (Last 24H):    Intake/Output Summary (Last 24 hours) at 3/29/2019 1439  Last data filed at 3/29/2019 1000  Gross per 24 hour   Intake 1562.87 ml   Output 43 ml   Net 1519.87 ml            Physical Exam  Gen: NAD  Head/Eyes/Ears/Nose: NCAT, MMM   Neck: Soft, supple,  no JVD   Lung: decreased breath sounds bilaterally, no wheezes  Heart: Normal S1/S2, regular rate and rhythm, no gallops, normal PMI  Abdomen: Soft, NT/ND, NABS, no masses, no guarding/rebounding, no HSM, no ascitis  Extremities: No LE edema bilaterally, 2+ pulses bilaterally in upper/lower extremities   Skin: Normal color and turgor. No rashes, no petechia, no ecchymoses.   Neuro: Sedated and intubated     Labs:     Recent Labs   Lab 03/29/19 0052 03/29/19  0630   WBC 20.48* 21.07*   HGB 13.2* 13.6*   HCT 42.4 44.5   * 374*   LYMPH 27.7  5.7* 12.4*  2.6   MONO 3.4*  0.7 1.9*  0.4   EOSINOPHIL 0.6 0.1       Recent Labs   Lab 03/29/19 0630 03/29/19  1207   APTT 38.6* 37.8*   INR 1.1  --         Recent Labs   Lab 03/29/19 0052 03/29/19  0630   * 362*   CALCIUM 9.3 8.4*   ALBUMIN 3.7 2.9*   PROT 6.9 6.1    135*   K 4.5 4.5   CO2 14* 12*    106   BUN 22* 25*   CREATININE 1.8* 2.6*   ALKPHOS 119 128   ALT 98* 479*   AST 90* 1,152*   BILITOT 0.2 0.4   MG  --  2.5   PHOS  --  8.5*     Estimated Creatinine Clearance: 43.5 mL/min (A) (based on SCr of 2.6 mg/dL (H)).    Recent Labs   Lab 03/29/19 0052   *       Recent Labs   Lab 03/29/19  0804   LDH 3,101*       Microbiology Results (last 7 days)     Procedure Component Value Units Date/Time    Blood culture [[id number]]     Order Status:  No result Specimen:  Blood     Blood culture [[id number]]     Order Status:  No result Specimen:  Blood             ASSESSMENT AND PLAN:     # STEMI s/p LM PCI and Cardiogenic Shock   -Will place [place] via R IJ  -Will do VA ECMO placement   -Keep Patient NPO  -The risks, benefits & alternatives of the procedure were explained to the patient.    -The risks of RHC and VA ECMO include but are not limited to:  Bleeding, infection, heart rhythm abnormalities, allergic reactions, kidney injury, stroke, limb loss and death.    -The risks of moderate sedation include hypotension, respiratory depression,  arrhythmias, bronchospasm, & death.    -Informed consent was obtained & the patient is agreeable to proceed with the procedure.      Kranthi Soto MD  Interventional Cardiovascular Fellow, PGY VII  Pager: [phone]  3/29/2019 2:39 PM

## 2019-03-29 NOTE — ASSESSMENT & PLAN NOTE
44yo male with STEMI who is now s/p LM stent and Impella 3.5 placement.  He was loaded with Brilinta.  In my discussion with the interventional cardiology fellow, his MAP was initially in the 50s on Impella support.  It is now in the high 80s and remains there with weaning of norepinephrine.  Would recommend sodium bicarbonate (2-3 amps) for pH of 7.1.  A more normal pH will help his vasopressors function.  In setting of pulmonary edema, would recommend starting nitric oxide support (20-40ppm).  Would recommend central line to check CVP and would recommend echocardiogram.  Inotropic doses of epinephrine and nitric oxide would support his right heart as well.  For now would continue with aggressive medical management and I think he could get adequate support from the Impella.  Discussed with Dr. Calderon.

## 2019-03-29 NOTE — ASSESSMENT & PLAN NOTE
Anuric JACOBY 2/2 Ischemic ATN from Cardiogenic Shock    44 yo male presenting to hospital with chief complaint of SOB/CP found to have STEMI.  Taken urgently to cathlab, found to have 100% occlusion to LM, now s/p PCI with RAQUEL.  Impella was placed for cardiogenic shock, noted to be in biventricular failure.  CTS/IC evaluation for possible Tandem placement.    Plan/Recommendations:  -Discuss with primary team at bedside.  In current state, too unstable to be placed on SLED at this time.  -currently discussions going on about tandem placement.    -current pH of 7.2.  FiO2 100%.  Continue current bicarbonate gtt @ 50 cc/hr as he is anuric and on max vent settings.  -once advanced cardiac support in place, will start on SLED, dependent on stability with no net ultrafiltration  -please notify nephrology on call once dialysis line is placed.

## 2019-03-30 PROBLEM — R73.9 ACUTE HYPERGLYCEMIA: Status: ACTIVE | Noted: 2019-01-01

## 2019-03-30 NOTE — TRANSFER OF CARE
"Anesthesia Transfer of Care Note    Patient: Subhash Ac    Procedure(s) Performed: Procedure(s) (LRB):  LEFT ABOVE KNEE AMPUTATION REMOVAL OF IMPELLA 5.0 (Left)  FASCIOTOMY 3 COMPARTMENT (Left)    Patient location: ICU    Anesthesia Type: general    Transport from OR: Transported from OR intubated on 100% O2 by AMBU with adequate controlled ventilation. Continuous ECG monitoring in transport. Continuous SpO2 monitoring in transport. Continuos invasive BP monitoring in transport    Post pain: adequate analgesia    Post assessment: no apparent anesthetic complications    Post vital signs: stable    Level of consciousness: sedated    Nausea/Vomiting: no nausea/vomiting    Complications: none          Last vitals:   Visit Vitals  BP (!) 70/0 (BP Location: Right arm, Patient Position: Lying)   Pulse 62   Temp 36.8 °C (98.2 °F) (Axillary)   Resp 10   Ht 5' 9" (1.753 m)   Wt 103.9 kg (229 lb)   SpO2 100%   BMI 33.82 kg/m²     "
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
## 2019-03-30 NOTE — PROGRESS NOTES
Patient will go to the OR now with for an emergent surgery. Will provide SLED during the procedure. Dialysis nurse will be available at 11:00am.
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
## 2019-03-30 NOTE — SUBJECTIVE & OBJECTIVE
Interval HPI:   Received in ICU. Intubated and sedated. In critical condition. VA ECMO. BG at goal without insulin. CRRT per nephrology. Hydrocortisone 100 mg x2.   Eating:   NPO  Hypoglycemia and intervention: No  Fever: No  TPN and/or TF: No    PMH, PSH, FH, SH reviewed       Review of Systems   Unable to obtain due to: Sedation,Intubation,Altered mental status,Critical illness. No ROS on file.     Current Medications and/or Treatments Impacting Glycemic Control  Immunotherapy:    Immunosuppressants     None        Steroids:   Hormones (From admission, onward)    Start     Stop Route Frequency Ordered    03/29/19 2330  hydrocortisone sodium succinate injection 100 mg      -- IV Every 8 hours 03/29/19 2319 03/29/19 2015  vasopressin (PITRESSIN) 0.2 Units/mL in dextrose 5 % 100 mL infusion      -- IV Continuous 03/29/19 2005 03/29/19 1148  vasopressin (PITRESSIN) 20 unit/mL injection     Note to Pharmacy:  Created by cabinet override    03/29 2359 03/29/19 1148        Pressors:    Autonomic Drugs (From admission, onward)    Start     Stop Route Frequency Ordered    03/29/19 2045  EPINEPHrine (ADRENALIN) 10 mg in sodium chloride 0.9% 250 mL infusion     Question Answer Comment   Titrate by: (in mcg/kg/min) 0.05    Titrate interval: (in minutes) 5    Titrate to maintain: (SBP or MAP or Cardiac Index) SBP    Maximum dose: (in mcg/kg/min) 2        -- IV Continuous 03/29/19 2044 03/29/19 2045  norepinephrine 16 mg in dextrose 5 % 250 mL infusion     Question Answer Comment   Titrate by: (in mcg/kg/min) 0.05    Titrate interval: (in minutes) 5    Titrate to maintain: (MAP or SBP) MAP    Greater than: (in mmHg) 65    Maximum dose: (in mcg/kg/min) 3        -- IV Continuous 03/29/19 2044 03/29/19 2040  cisatracurium (NIMBEX) 200 mg in dextrose 5 % 100 mL infusion     Question Answer Comment   Bolus over 1 minute (in mg): 0    Begin at (in mcg/kg/min): 2    Titrate by: (in mcg/kg/min) 1    Titrate interval: (in  minutes) 5    To maintain a train-of-four of (TOF_/4): 0/4    Maximum dose: (in mcg/kg/min) 15        -- IV Continuous 03/29/19 2040 03/29/19 1938  norepinephrine 1 mg/mL injection     Note to Pharmacy:  Created by cabinet override    03/30 0744   03/29/19 1938    03/29/19 1901  norepinephrine 1 mg/mL injection     Note to Pharmacy:  Created by cabinet override    03/30 0714   03/29/19 1901    03/29/19 1655  norepinephrine 1 mg/mL injection     Note to Pharmacy:  Created by cabinet override    03/30 0459   03/29/19 1655    03/29/19 1148  norepinephrine 1 mg/mL injection     Note to Pharmacy:  Created by cabinet override    03/29 2359   03/29/19 1148    03/29/19 0558  EPINEPHrine (ADRENALIN) 1 mg/mL injection     Note to Pharmacy:  Created by cabinet override    03/29 1814   03/29/19 0558        Hyperglycemia/Diabetes Medications:   Antihyperglycemics (From admission, onward)    Start     Stop Route Frequency Ordered    03/29/19 2015  insulin regular (Humulin R) 100 Units in sodium chloride 0.9% 100 mL infusion     Question:  Insulin rate changes (DO NOT MODIFY ANSWER)  Answer:  [email or web address]    -- IV Continuous 03/29/19 2005             PHYSICAL EXAMINATION:  Vitals:    03/30/19 1010   BP:    Pulse: 99   Resp: (!) 8   Temp:      Body mass index is 33.82 kg/m².    Physical Exam   Constitutional: Well developed, well nourished. In critical condition.   ENT: External ears no masses with nose patent.  Neck: Supple; trachea midline.   Cardiovascular: paced/ VA ECMO,  Doppler pulse to RLE.   Lungs: Intubated on ventilator; lungs anterior bilaterally clear to auscultation.  Abdomen: Soft, no masses, no hernias Hypoactive bowel sounds.   MS: No clubbing or cyanosis of nails noted;  unable to assess gait.  Skin: No rashes, lesions, or ulcers; no nodules. Left AKA;dressing in place.   Psychiatric: GENESIS  Neurological: GENESIS.

## 2019-03-30 NOTE — OP NOTE
DATE OF PROCEDURE:  03/30/2019    PREOPERATIVE DIAGNOSES:  1.  Cardiogenic shock.  2.  Status post VA-ECMO.  3.  ST elevated MI.  4.  Acute kidney injury with acute renal failure, on dialysis.  5.  Nonspecific fluid overload.  6.  Metabolic acidosis.  7.  Acute shock liver.  8.  Acute ischemia of left lower extremity.    POSTOPERATIVE DIAGNOSES:  1.  Cardiogenic shock.  2.  Status post VA-ECMO.  3.  ST elevated MI.  4.  Acute kidney injury with acute renal failure, on dialysis.  5.  Nonspecific fluid overload.  6.  Metabolic acidosis.  7.  Acute shock liver.  8.  Acute ischemia of left lower extremity.    OPERATIONS PERFORMED:  1.  Emergent removal of Impella via cut down of the left femoral artery.  2.  Primary repair of the left femoral artery.  3.  High above knee, mid thigh and above amputation.  4.  Fasciotomy of thigh compartments.  5.  Closure and placement of wound VAC.  Total length of 20 x 5 cm.    STAFF SURGEON:  Momo London M.D.    FIRST ASSISTANT:  Juan Virk M.D.    SECOND ASSISTANT:  Berlin Calderon MD    THIRD ASSISTANT:  Collin Fraga.    ANESTHESIA:  GETA.    ESTIMATED BLOOD LOSS:  400 mL of blood.    KEY FINDINGS OF THE OPERATION:  1.  Fasciotomy of the left lower leg revealed completely nonviable muscle.    Above-knee incision also revealed similar.  Incision was then proximally   extended to the point where viable muscles were noted that was made as the line   of amputation.  2.  Good approximation of the subcutaneous tissues and muscles were brought   together over the cut end of the bone.  3.  Staples were applied.  4.  Good hemostasis was ensured.  5.  Primary repair of the femoral artery was also carried out after removal of   the Impella CP.    INDICATION OF OPERATION:  This is a 43-year-old male who came in an ST elevated   MI. Patient was taken to the cath lab for emergent intervention.  Interventional Cardiology opened the left main and placed an Impella CP for  hemodynamic support. Patient initially did well, then progressed to  cardiogenic shock . Pt was taken back to the cath lab and was placed on VA-ECMO.  Overnight, the patient was extremely   sick requiring extremely high doses of pressors . Resuscitative measures  Were continued which resulted in significant improvement in hemodynamic status and weaning down of high doses of pressors.  Originally, the patient was on 7 of Levophed and 0.4 of epinephrine, which was then gradually weaned down to 0.08 of   Levophed and 0.1 of epinephrine.  Lactate levels started to improve( >15range improved to 10) , base deficit also improved ( from  -20  To -5).  Early morning it was noted that patients  left lower extremity where the Impella had been placed was ischemic.  Vascular Surgery was consulted. Vascular Surgery documented futility of care ( please refer to vascular consult note)  The team taking care of him, which included Heart Failure, Cardiology, Cardiac Thoracic Surgery and Interventional Cardiology, all agreed that due to the patient's young age, limb over life decision was made, and a unaimous decision to proceed with a salvage option forabove-knee amputation.  Extensive discussions were carried out with the family.  Family   decided to proceed with an amputation and informed consent was obtained.Ffamily understood the gravity of the situation and extreme high risk and heroic measure for this operation.  However, due to the patient's youngage and good social support structure, decision was made to proceed with an salvage high thigh amputation.    DESCRIPTION OF PROCEDURE:  The patient was brought to the Operating Room and   placed in a supine position.  After induction of anesthesia, the area was   prepped and draped in the usual sterile fashion.  The left leg was   circumferentially draped and prepared.  An incision was made on the left femoral   artery and carried all the way down to the femoral artery.  Femoral  artery was   circumferentially dissected out.  Proximal control of the femoral artery was   carried out.  The Impella CP was then removed through the femoral artery with   good efflux of the blood.  Once there was no clot identified, primary repair of   the left femoral artery was carried out using a 5-0 Prolene.  Good hemostasis   was ensured.  Once that was done, the area was packed and attention was directed   towards getting out fasciotomy in the left lower extremity.  The left lower   extremity was then incised on the cuff, which resulted in complete nonviable   muscle.  The incision was then made right above the knee joint on the left side   and this area also appeared to be completely nonviable.  However, this incision   was the skin and subcutaneous tissue incision, was carried all the way up to the   mid thigh until we could see some viable muscle.  At this stage, we decided   that this would be demarcation line for amputation.  Using electrocautery, the   muscles were then divided at high mid thigh level as much as nonviable muscles   were seen were excised and removed.  It was carried all the way down to the bone   to the femur in between the blood vessels were secured in between the clamps   and tied with using 2-0 silks.  Once good hemostasis was ensured, using a Gigli   saw, the femur was transected and was submitted for pathology and the further   hemostasis was ensured by using electrocautery.  Once that was done, the muscles   were then brought together as a flap for coverage of the bone.  Multiple layers   of closure was then carried out after ensuring hemostasis.  The skin was then   approximated with staples and a 20 x 5 cm wound VAC was then placed, Prevena on   the cut edge to provide an airtight seal.  Before we had done the closure of   this, all the various compartments in the thigh were checked for tenseness and   all the fascial compartments were opened up using electrocautery.  The  patient   tolerated the procedure well and was taken back into the Intensive Care Unit in   a stable condition.  Terminal count of needles, sponges, and instrument was   found to be correct.  Throughout the procedure, the patient was being dialyzed   on CRT and ECMO support was excellent.  We did note that the LVOT had clot   formation; however, the patient was completely nonpulsatile and anticoagulation   was restarted at the end of the case.    Medicare statement: There was no adequately trained cardiothoracic resident available, Dr. Virk acted as first assistant  .  AB/IN  dd: 03/30/2019 16:50:12 (CDT)  td: 03/30/2019 17:45:21 (CDT)  Doc ID   #[id number]  Job ID #[id number]    CC:

## 2019-03-30 NOTE — PROGRESS NOTES
Intra-Operative HD complete, blood returned. 305ml of fluid removed, pt stable, tolerated treatment well, NAD noted. Lt IJ CVC flushed with NS, capped, & secured. Total treatment time = 2.22 hours, report given to Primary Anesthesia Resident with no new orders noted. See HD flowsheet for treatment details.

## 2019-03-30 NOTE — ASSESSMENT & PLAN NOTE
Anuric JACOBY 2/2 Ischemic ATN from Cardiogenic Shock    42 yo male presenting to hospital with chief complaint of SOB/CP found to have STEMI.  Taken urgently to cathlab, found to have 100% occlusion to LM, now s/p PCI with RAQUEL.  Impella was placed for cardiogenic shock, noted to be in biventricular failure.  CTS/IC evaluation for possible Tandem placement.    Plan/Recommendations:  - Discuss with primary team at bedside, Dr. Tom, and my staff Dr. Horton the need for SLED treatment intraop as patient emergently being transferred for surgery  - Will provide SLED intraop, and will provide high Ca bath as requested by Dr. Tom and cardio team; RRT intraop to be managed by anesthesiology  - Patient critically ill, poor prognosis  - Will follow closely  - Strict I/Os and chart  - Once out of surgery and hemodynamically stable, will reevaluate for continued RRT / SLED treatment

## 2019-03-30 NOTE — ASSESSMENT & PLAN NOTE
44 YO M admitted w STEMI underwent PCI to LM/LAD and developed cardiogenic shock, briefly on IABP, followed by Impella CP and high doses of epinephrine. He was quickly deteriorating earlier today with hemolysis and worsening renal dysfunction. CI was just 1.8    We recommended earlier to escalate mechanical support and wean inopressors as tolerated. Since then, he got UE arterial Dopplers to assess Impella 5.0 candidacy but was rejected for that. He went to the cath lab later for TandemHeart.

## 2019-03-30 NOTE — PROGRESS NOTES
Silver-colored ring with multiple clear stones taken from left ring finger and given to wife at bedside to take home due to increased swelling.

## 2019-03-30 NOTE — PLAN OF CARE
Problem: Adult Inpatient Plan of Care  Goal: Plan of Care Review  Outcome: Ongoing (interventions implemented as appropriate)  Nutrition assessment completed. Please see RD note for details.    Recommendation/Intervention:   1. As medically stable to initiate nutrition, recommend starting enteral nutrition.   Peptamen Intense VHP @ goal rate 65mL/hr.   - Initiate @ 15mL/hr and increase by 10mL q4hrs, or as tolerated, until goal rate is reached.   - Provides 1560kcals, 143g protein and 1310mL free water.     2. If unable to initiate enteral nutrition with pressor requirements, recommend TPN.   125g Dextrose, 145g AA and 20% lipids in 250mL to provide 1505kcals, 145g protein.     RD to monitor.      Goals: Pt to receive nutrition by RD follow up  Nutrition Goal Status: new  Communication of RD Recs: reviewed with RN
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
## 2019-03-30 NOTE — ASSESSMENT & PLAN NOTE
43 y.o. male s/p STEMI on 3/29 and taken for [facility] w PCI to LM-LAD, IABP placement and immediate removal followed by placement of Impella CP. Patient then placed on Tandem heart VAD with ECMO.    Neuro:   - Intubated and sedated on Propofol gtt  - Off Nimbex, Precedex  - Wean sedation per CTS  - Multimodal pain regimen    Pulmonary:   - Intubated  - ECMO   - Daily CXRs while intubated  - ABGs PRN  - BTs, DuoNebs    Cardiac:   - 3/29: code STEMI and taken for [facility] w PCI to LM-LAD, IABP placement and immediate removal followed by placement of Impella CP. Continued heart failure then led to placement of Tandem heart VAD with ECMO.   - Epi, Norepi, Vaso gtt's  - NO at 10ppm  - Wean pressors per CTS  - Monitor chest tube output  - Anticoagulation per CTS    Renal:    - Anuric on CRRT at 200 UF rate  - Trend BUN/Cr    ID:   - Tmax 103.8  - Leukocytosis 26 postop, continue to trend  - Trend WBC daily  - Ancef    Hem/Onc:   - H/H stable  - Transfuse per CTS  - heparin gtt    Endocrine:    - Insulin gtt for glucose control  - Endocrine consulted, appreciate assistance    Fluids/Electrolytes/Nutrition/GI:   - Replace electrolytes PRN  - mIVF per CTS  - Currently NPO, diet per CTS    PPx:  - DVT: heparin gtt, SCDs    DISPO:  - Continue SICU care  - Wean pressors, sedation, vent requirements per CTS    Primary: CTS
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
## 2019-03-30 NOTE — CONSULTS
"  [facility]  Adult Nutrition  Consult Note    SUMMARY     Recommendations    Recommendation/Intervention:   1. As medically stable to initiate nutrition, recommend starting enteral nutrition.   Peptamen Intense VHP @ goal rate 65mL/hr.   - Initiate @ 15mL/hr and increase by 10mL q4hrs, or as tolerated, until goal rate is reached.   - Provides 1560kcals, 143g protein and 1310mL free water.     2. If unable to initiate enteral nutrition with pressor requirements, recommend TPN.   125g Dextrose, 145g AA and 20% lipids in 250mL to provide 1505kcals, 145g protein.     RD to monitor.      Goals: Pt to receive nutrition by RD follow up  Nutrition Goal Status: new  Communication of RD Recs: reviewed with RN    Reason for Assessment    Reason For Assessment: consult  Diagnosis: other (see comments)(Cardiogenic shock)  Relevant Medical History: dysarthria, short term memory loss  General Information Comments: Pt intubated, sedated, VA ECMO initiated overnight. Family discussions with medical teams ongoing, unable to obtain nutrition hx at this time. Pt to OR today for emergent AKA.   Nutrition Discharge Planning: unable to determine at this time      Nutrition/Diet History    Spiritual, Cultural Beliefs, Hindu Practices, Values that Affect Care: (EGNESIS at this time)  Factors Affecting Nutritional Intake: NPO, on mechanical ventilation    Anthropometrics    Temp: 98.1 °F (36.7 °C)  Height: 5' 9" (175.3 cm)  Height (inches): 69 in  Weight: 103.9 kg (229 lb)  Weight (lb): 229 lb  Ideal Body Weight (IBW), Male: 160 lb  % Ideal Body Weight, Male (lb): 143.13 lb  BMI (Calculated): 33.9  BMI Grade: 30 - 34.9- obesity - grade I       Lab/Procedures/Meds    Pertinent Labs Reviewed: reviewed  Pertinent Labs Comments: K 5.6, Ph 6.3, BUN 12, POCT Glu 127  Pertinent Medications Reviewed: reviewed  Pertinent Medications Comments: nimbex, propofol, norepi, epi, vasopressin, precedex,     Estimated/Assessed " Needs    Weight Used For Calorie Calculations: 103.9 kg (229 lb 0.9 oz)(to be updated s/p AKA)  Energy Calorie Requirements (kcal): 1142- 1454  Energy Need Method: Kcal/kg(11-14g/kg ABW)  Protein Requirements: 109-146g(1.5-2.0g/kg)  Weight Used For Protein Calculations: 72.7 kg (160 lb 4.4 oz)  Fluid Requirements (mL): 1mL/kcal or per MD     RDA Method (mL): 1142         Nutrition Prescription Ordered    Current Diet Order: NPO    Evaluation of Received Nutrient/Fluid Intake    Lipid Calories (kcals): 660 kcals(propofol @ 25mL/hr)  % Intake of Estimated Energy Needs: 0 - 25 %  % Meal Intake: NPO    Nutrition Risk    Level of Risk/Frequency of Follow-up: (f/u 2x/week)     Assessment and Plan     Nutrition Problem  Inadequate energy intake    Related to (etiology):   NPO    Signs and Symptoms (as evidenced by):   Pt receiving <85% EEN and EPN.     Intervention:  Collaboration of nutrition care    Nutrition Diagnosis Status:   New    Monitor and Evaluation    Food and Nutrient Intake: enteral nutrition intake  Food and Nutrient Adminstration: enteral and parenteral nutrition administration  Anthropometric Measurements: weight, weight change, body mass index  Biochemical Data, Medical Tests and Procedures: gastrointestinal profile, glucose/endocrine profile, electrolyte and renal panel, inflammatory profile, lipid profile  Nutrition-Focused Physical Findings: overall appearance        Nutrition Follow-Up    RD Follow-up?: Yes

## 2019-03-30 NOTE — PROCEDURES
"    Post Tandem and Transvenous Pacer Placement  Referring Physician: Berlin Calderon MD  Procedure: Placement-tandemheart percutaneous ventricular assist device (Left), Insertion, Pacemaker, Temporary Transvenous       Access: R IJ, R CFA, L CFV  No complications    See full report for further details    Intervention:   none  Closure device: retained devices and sheaths    Post Cath Exam:   BP (!) 87/70   Pulse (!) 125   Temp (!) 103.8 °F (39.9 °C) (Axillary)   Resp (!) 62   Ht 5' 9" (1.753 m)   Wt 103.9 kg (229 lb)   SpO2 (!) 76%   BMI 33.82 kg/m²   No unusual pain, hematoma, thrill or bruit at vascular access site.  Distal pulse present without signs of ischemia.    Recommendations:   - Routine post-cath care  - Cardiac rehab referral, Smoking cessation counseling, CTS consult, Follow-up with outpatient cardiologist      Signed:  Wesly Berg MD  [facility]   3/29/2019 7:56 PM    "

## 2019-03-30 NOTE — PLAN OF CARE
Problem: Adult Inpatient Plan of Care  Goal: Plan of Care Review  Outcome: Ongoing (interventions implemented as appropriate)  Patient remains intubated, A/C 50% O2 5 Peep. Nitric @ 20. ECMO and Impella devices remain in place. ECMO speed 3700, Flows 3.4-3.6. Impella P-1, Flow 0.8-0.9. Gtts: Epi, Levo, Vaso, Bicarb, Propofol & Heparin. Pt w/ continuous CRRT, UF @350cc/hr. No UOP this shift. Patient and spouse updated with plan of care.

## 2019-03-30 NOTE — PROGRESS NOTES
Received patient on VA ECMO this morning.  Patient is cannulated in the RFA with a 17FR cannula and RIJ with a 31FR cannula.  RPM and flows have fluctuated throughout shift based on pressure and volume needs.      Patient is currently on an RPM of 4200 to deliver a flow of 4.3 LPM.  Sweep is at a flow of 6 LPM at 100%.      Patient went to the OR today for an above the knee amputation of the Left leg.  4 packs of RBC's and one pack of FFP was given by the perfusionist Collin Valdovinos through the ECMO circuit during the procedure.      There were no complications while traveling to and from the OR.  Patient left the ICU at 1140 and returned to his room at 1530.  The ECMO travel checklist was completed.

## 2019-03-30 NOTE — ANESTHESIA PREPROCEDURE EVALUATION
"[facility]  Anesthesia Pre-Operative Evaluation         Patient Name: Subhash Ac  YOB: 1975  MRN: [id number]    SUBJECTIVE:     03/30/2019    Pre-operative evaluation for Procedure(s) (LRB):  LEFT ABOVE KNEE AMPUTATION REMOVAL OF IMPELLA 5.0 (Left)    Subhash Ac is a 43 y.o. male with no significant PMHx who is admitted on 3/29/19 for STEMI and taken for [facility] w PCI to LM-LAD, IABP placement and immediate removal followed by placement of Impella CP. Patient then placed on Tandem heart VAD with ECMO. He is currently intubated, sedated, and paralyzed on cisatracurium. He is also on norepinephrine, epinephrine, vasopressin, Jem, and CRRT. LLE has become more ischemic overnight. Vascular surgery consulted; per their notes: "Left leg is not viable on clinical exam. Following removal of Impella device, patient has high chance of reperfusion syndrome which will likely be fatal. At this time, Impella device cannot be removed and without removal of Impella device, reperfusion likely cannot be restored."    Patient now presents for the above procedure(s).      LDA:       Introducer 03/29/19 2015 left femoral vein (Active)   Number of days: 0            Percutaneous Central Line Insertion/Assessment - triple lumen  03/29/19 2015 left internal jugular (Active)   Number of days: 0            Trialysis (Dialysis) Catheter 03/29/19 2015 left internal jugular (Active)   Number of days: 0           18G Peripheral IV - Single Lumen 03/29/19 0039 Left Antecubital (Active)   Number of days: 1            Arterial Line 03/29/19 2015 Right Radial (Active)   Number of days: 0            Pacer Wires 03/29/19 2015 (Active)   Number of days: 0            VAD 03/29/19 0200 Impella (Active)   Number of days: 1            VAD 03/30/19 0000 Right ventricular assist device (Active)   Number of days: 0            NG/OG Tube 03/29/19 0300 Center mouth (Active)   Number of days: 1            Urethral Catheter " 03/29/19 (Active)   Number of days: 1       Previous airway:   7.5 ETT in place      Drips:   sodium chloride 0.9% 200 mL/hr at 03/30/19 0251    cisatracurium (NIMBEX) infusion 1 mcg/kg/min (03/30/19 1000)    dexmedetomidine (PRECEDEX) infusion Stopped (03/29/19 2200)    dextrose 5 % and 0.9 % NaCl with KCl 20 mEq 5 mL/hr (03/29/19 2119)    DOBUTamine Stopped (03/30/19 0000)    epinephrine infusion 0.08 mcg/kg/min (03/30/19 1000)    heparin (porcine) in dextrose 5% 500 mL (IMPELLA)      heparin (porcine) in 5 % dex 800 Units/hr (03/30/19 1000)    insulin (HUMAN R) infusion (adults) Stopped (03/29/19 2015)    niCARdipine Stopped (03/29/19 2015)    nitric oxide gas      norepinephrine bitartrate-D5W 0.04 mcg/kg/min (03/30/19 1000)    propofol 40 mcg/kg/min (03/30/19 1000)    sodium bicarbonate drip 10 mL/hr at 03/30/19 1000    vasopressin (PITRESSIN) infusion 0.04 Units/min (03/30/19 1000)       Patient Active Problem List   Diagnosis    Chest pain    ST elevation myocardial infarction (STEMI)    Cardiogenic shock    On mechanically assisted ventilation    JACOBY (acute kidney injury)    LFT elevation    Metabolic acidosis    ATN (acute tubular necrosis)    Acute MI    Lower limb ischemia       Review of patient's allergies indicates:  No Known Allergies    Current Inpatient Medications:   aspirin  81 mg Oral Daily    calcium chloride  1 g Intravenous Once    calcium gluconate IVPB  2 g Intravenous Once    calcium gluconate IVPB  2,000 mg Intravenous Once    ceFAZolin (ANCEF) IVPB  2 g Intravenous Q12H    clopidogrel  75 mg Oral Daily    fluconazole (DIFLUCAN) IVPB  400 mg Intravenous Q24H    hydrocortisone sodium succinate  100 mg Intravenous Q8H    mupirocin  1 g Nasal BID    pantoprazole  40 mg Intravenous BID    polyethylene glycol  17 g Oral Daily    sodium bicarbonate  50 mEq Intravenous Once    vancomycin 750 mg in dextrose 5 % 250 mL IVPB (ready to mix system)  750 mg  Intravenous Q24H       No current facility-administered medications on file prior to encounter.      No current outpatient medications on file prior to encounter.       Past Surgical History:   Procedure Laterality Date    arm surgery      right arm       Social History     Socioeconomic History    Marital status:      Spouse name: Not on file    Number of children: Not on file    Years of education: Not on file    Highest education level: Not on file   Occupational History    Not on file   Social Needs    Financial resource strain: Not on file    Food insecurity:     Worry: Not on file     Inability: Not on file    Transportation needs:     Medical: Not on file     Non-medical: Not on file   Tobacco Use    Smoking status: Not on file   Substance and Sexual Activity    Alcohol use: Not on file    Drug use: Not on file    Sexual activity: Not on file   Lifestyle    Physical activity:     Days per week: Not on file     Minutes per session: Not on file    Stress: Not on file   Relationships    Social connections:     Talks on phone: Not on file     Gets together: Not on file     Attends Jewish service: Not on file     Active member of club or organization: Not on file     Attends meetings of clubs or organizations: Not on file     Relationship status: Not on file    Intimate partner violence:     Fear of current or ex partner: Not on file     Emotionally abused: Not on file     Physically abused: Not on file     Forced sexual activity: Not on file   Other Topics Concern    Not on file   Social History Narrative    Not on file       OBJECTIVE:     Vital Signs Range (Last 24H):  Temp:  [36.5 °C (97.7 °F)-39.9 °C (103.8 °F)]   Pulse:  []   Resp:  [0-69]   BP: (68-70)/(0)   SpO2:  [35 %-100 %]   Arterial Line BP: ()/()       Significant Labs:  Lab Results   Component Value Date    WBC 19.69 (H) 03/30/2019    HGB 11.4 (L) 03/30/2019    HCT 31 (L) 03/30/2019     (L)  03/30/2019    CHOL 194 03/29/2019    TRIG 256 (H) 03/29/2019    HDL 25 (L) 03/29/2019    ALT 11,310 (H) 03/30/2019    AST 19,061 (H) 03/30/2019     03/30/2019    K 5.6 (H) 03/30/2019    K 5.6 (H) 03/30/2019    CL 98 03/30/2019    CREATININE 2.6 (H) 03/30/2019    BUN 12 03/30/2019    CO2 14 (L) 03/30/2019    INR 2.6 (H) 03/30/2019    HGBA1C 5.3 03/29/2019         Diagnostic Studies:  No relevant studies.      Cardiac Studies:  EKG:   No recent studies available.    2D Echo (3/30/2019):  · Severely decreased left ventricular systolic function. The estimated ejection fraction is 20% or less.  · Local segmental wall motion abnormalities.  · Grade I (mild) left ventricular diastolic dysfunction consistent with impaired relaxation.  · Normal left atrial pressure.  · Mildly to moderately reduced right ventricular systolic function.  · Mechanically ventilated; cannot use inferior caval vein diameter to estimate central venous pressure.  · IMPELLA well-positioned 4 cm into LV.      ASSESSMENT/PLAN:     Anesthesia Evaluation    I have reviewed the Patient Summary Reports.    I have reviewed the Nursing Notes.   I have reviewed the Medications.     Review of Systems  Anesthesia Hx:  No problems with previous Anesthesia Denies Hx of Anesthetic complications    Cardiovascular:  Coronary Artery Disease:  evidence of MI on ECG, evidence of MI on imaging study, hx of Percutaneous Coronary Intervention (PCI).  Congestive Heart Failure (CHF) , Acute Congestive Heart Failure   Renal/:  Kidney Function/Disease  Acute Renal Failure (ARF), Acute Kidney Injury        Physical Exam  General:  Well nourished    Airway/Jaw/Neck:  Airway Findings: Pre-Existing Airway Tube(s): Oral Endotracheal tube      Chest/Lungs:  Chest/Lungs Findings: Clear to auscultation  Intubated    Vent Mode: A/C  Oxygen Concentration (%):  () 51  Resp Rate Total:  (10 br/min-49 br/min) 10 br/min  Vt Set:  (300 mL-520 mL) 300 mL  PEEP/CPAP:  (5 cmH20-10  cmH20) 5 cmH20  Pressure Support:  (0 cmH20) 0 cmH20  Mean Airway Pressure:  (7 bhT40-57 cmH20) 7 cmH20     Heart/Vascular:  Heart Findings: Sounds: Quiet  Vascular Findings:  Vascular Access: Peripheral IV(s), Arterial Line(s), Existing Central Line(s)        Mental Status:  Mental Status Findings:  Unconscious         Anesthesia Plan  Type of Anesthesia, risks & benefits discussed:  Anesthesia Type:  general  Patient's Preference:   Intra-op Monitoring Plan: arterial line, central line, [place], cardiac output and standard ASA monitors  Intra-op Monitoring Plan Comments:   Post Op Pain Control Plan: multimodal analgesia, IV/PO Opioids PRN and per primary service following discharge from PACU  Post Op Pain Control Plan Comments:   Induction:   IV  Beta Blocker:  Patient is not currently on a Beta-Blocker (No further documentation required).       Informed Consent: Patient representative understands risks and agrees with Anesthesia plan.  Questions answered. Anesthesia consent signed with patient representative.  ASA Score: 5  emergent   Day of Surgery Review of History & Physical:    H&P update referred to the surgeon.         Ready For Surgery From Anesthesia Perspective.

## 2019-03-30 NOTE — PLAN OF CARE
Problem: Adult Inpatient Plan of Care  Goal: Plan of Care Review  Outcome: Ongoing (interventions implemented as appropriate)    See vital signs and assessments in flowsheets. See below for updates on today's progress.     Pulmonary: ETT 25 @ lips, A/C, rate 26, FiO2 100%, PEEP 7.5, . O2 sats not correlating throughout the day, MD notified. ABG O2 sats >96%.     Cardiovascular: Impella CP 3.5 on left groin, P5 without alarms during shift. EDP 0, CVP 10-12. ST, -140s, vasopressors titrated to maintain MAP >65. Bilateral radial pulses +1, right PT/DP pulses with doppler, left PT/DP pulses absent (Dr. Lyons notified).    Neurological: Withdraws to pain, sedated with Propofol and Precedex. Tmax 103.8    Gastrointestinal: OG tube maintained, no residual. No BM today.    Genitourinary: Trujillo catheter maintained, C/D/I. Total urine output during shift 23cc. Nephrology consulted for dialysis later today.    Skin/Bath: Diaphoretic, mottling on lower extremities, skin is cool. Dressing on right groin from stent placement, blood noted and marked on dressing, Dr. Lyons notified.     Infusions: Propofol, Precedex, Levo, Epi, Vasopressin, Aggrastat, Sodium Bicarb    Patient progressing towards goals as tolerated, plan of care communicated and reviewed with Subhash Ac and family. All concerns addressed. Will continue to monitor.

## 2019-03-30 NOTE — PROGRESS NOTES
Lmom on pt's cell to return call at office in regards to her lab. Pharmacist Renal Dose Adjustment Note    Subhash Ac is a 43 y.o. male being treated with the medication cefazolin    Patient Data:    Vital Signs (Most Recent):  Temp: 98.4 °F (36.9 °C) (03/30/19 0700)  Pulse: 99 (03/30/19 0800)  Resp: (!) 2 (03/30/19 0751)  BP: (!) 70/0 (03/30/19 0700)  SpO2: 100 % (03/30/19 0700)   Vital Signs (72h Range):  Temp:  [97 °F (36.1 °C)-103.8 °F (39.9 °C)]   Pulse:  []   Resp:  [2-69]   BP: ()/(0-70)   SpO2:  [35 %-100 %]   Arterial Line BP: ()/()      Recent Labs   Lab 03/29/19 2009 03/30/19  0015 03/30/19  0400   CREATININE 5.0* 3.5* 2.9*  2.9*     Serum creatinine: 2.9 mg/dL (H) 03/30/19 0400  Estimated creatinine clearance: 39 mL/min (A)    Cefazolin 2 g IV Q8H will be changed to cefazolin 2 g IV Q12H while on CRRT - will follow nephrology's plans and make adjustments if patient taken off CRRT.    Pharmacist's Name: Marci Cash  Pharmacist's Extension: [phone]

## 2019-03-30 NOTE — ASSESSMENT & PLAN NOTE
Avoid insulin stacking and hypoglycemia.  Estimated Creatinine Clearance: 51.4 mL/min (A) (based on SCr of 2.2 mg/dL (H)).  CRRT per nephrology.

## 2019-03-30 NOTE — NURSING
Upon arrival to shift,Pt on VA ECMO, Speed at 3600, flows 3.5-4, Impella in place, patient with TVP, VVI, paced at 100%, left leg mottled and unable to doppler pulses. Dr. Leblanc, Dr. Arias, and Dr. London at bedside. Vascular to bedside to assess as well.     Flows increased to 4200, epi, levo and vaso weaned. Epi at .08mcg/kg/min, vaso at .04units/m, levo at .04 mcg/kg/min.Heparin gtt increased to 800 units/h. 500 Albumin given. Labs monitored, 4g of calcium gluconate administered since 7am, 2 g calcium chloride given IV push.    Team discussed critical state of patients condition with family. Wife, patients parents and patients brother at bedside. Decision was made to take patient back the OR for a AKA as well as impella removal. Consents completed, anesthesia to bedside to transfer patient to OR.     Patient with MAP's in the low 70's, epi at .08mcg/kg/min, levo at .04mcg/kg/min, Vaso at .04units/min . Heparin gtt stopped, patients CRRT rinsed back, blood bank spoken with, Dialysis nurse spoken with, pt hooked up to portable monitor and bagged via bag valve mask per anesthesia upon transfer with stable hemodynamics.

## 2019-03-30 NOTE — PROGRESS NOTES
Pt admitted to SICU [id number] from Cath Lab. Tandem and Impella in Place. Pt connected to Nitric and Ventilator via RT. Dr. Marina and Dr. Montoya at bedside. Upon initial assessment, LLE DP and PT pulses absent, MD's aware, carolyn miller applied to lower extremities. Orders implemented at appropriate.

## 2019-03-30 NOTE — CONSULTS
[facility]  Endocrinology  Diabetes Consult Note    Consult Requested by: Berlin Calderon MD   Reason for admit: Cardiogenic shock    HISTORY OF PRESENT ILLNESS:  Reason for Consult: Management of Hyperglycemia     Surgical Procedure and Date: Left heart cath, percutaneous coronary intervention, Placement, IABP and removal; INSERTION, IMPELLA (N/A) 3/29/19; Left above knee amputation 3/30/19        HPI:   Patient is a 43 y.o. male with a diagnosis of STEMI and cardiogenic shock. Patient presented with diaphoresis and severe chest pain. Patient to cath lab; PCI, IABP (placed and removed), and placement of Impella. No personal history of DM per chart review. Endocrinology consulted for BG management.      Lab Results   Component Value Date    HGBA1C 5.3 03/29/2019     Of note post-operatively (3/30/19): placed on tandem heart VAD with ECMO overnight, CRRT, shock liver, and left leg ischemia which required emergent AKA.         Interval HPI:   Received in ICU. Intubated and sedated. In critical condition. VA ECMO. BG at goal without insulin. CRRT per nephrology. Hydrocortisone 100 mg x2.   Eating:   NPO  Hypoglycemia and intervention: No  Fever: No  TPN and/or TF: No    PMH, PSH, FH, SH reviewed       Review of Systems   Unable to obtain due to: Sedation,Intubation,Altered mental status,Critical illness. No ROS on file.     Current Medications and/or Treatments Impacting Glycemic Control  Immunotherapy:    Immunosuppressants     None        Steroids:   Hormones (From admission, onward)    Start     Stop Route Frequency Ordered    03/29/19 2330  hydrocortisone sodium succinate injection 100 mg      -- IV Every 8 hours 03/29/19 2319 03/29/19 2015  vasopressin (PITRESSIN) 0.2 Units/mL in dextrose 5 % 100 mL infusion      -- IV Continuous 03/29/19 2005 03/29/19 1148  vasopressin (PITRESSIN) 20 unit/mL injection     Note to Pharmacy:  Created by cabinet override    03/29 2608   03/29/19 1148         Pressors:    Autonomic Drugs (From admission, onward)    Start     Stop Route Frequency Ordered    03/29/19 2045  EPINEPHrine (ADRENALIN) 10 mg in sodium chloride 0.9% 250 mL infusion     Question Answer Comment   Titrate by: (in mcg/kg/min) 0.05    Titrate interval: (in minutes) 5    Titrate to maintain: (SBP or MAP or Cardiac Index) SBP    Maximum dose: (in mcg/kg/min) 2        -- IV Continuous 03/29/19 2044 03/29/19 2045  norepinephrine 16 mg in dextrose 5 % 250 mL infusion     Question Answer Comment   Titrate by: (in mcg/kg/min) 0.05    Titrate interval: (in minutes) 5    Titrate to maintain: (MAP or SBP) MAP    Greater than: (in mmHg) 65    Maximum dose: (in mcg/kg/min) 3        -- IV Continuous 03/29/19 2044 03/29/19 2040  cisatracurium (NIMBEX) 200 mg in dextrose 5 % 100 mL infusion     Question Answer Comment   Bolus over 1 minute (in mg): 0    Begin at (in mcg/kg/min): 2    Titrate by: (in mcg/kg/min) 1    Titrate interval: (in minutes) 5    To maintain a train-of-four of (TOF_/4): 0/4    Maximum dose: (in mcg/kg/min) 15        -- IV Continuous 03/29/19 2040 03/29/19 1938  norepinephrine 1 mg/mL injection     Note to Pharmacy:  Created by [employer] override    03/30 0744   03/29/19 1938    03/29/19 1901  norepinephrine 1 mg/mL injection     Note to Pharmacy:  Created by cabinet override    03/30 0714   03/29/19 1901    03/29/19 1655  norepinephrine 1 mg/mL injection     Note to Pharmacy:  Created by [employer] override    03/30 0459   03/29/19 1655    03/29/19 1148  norepinephrine 1 mg/mL injection     Note to Pharmacy:  Created by cabinet override    03/29 2359   03/29/19 1148    03/29/19 0558  EPINEPHrine (ADRENALIN) 1 mg/mL injection     Note to Pharmacy:  Created by [employer] In[employer] override    03/29 1814   03/29/19 0558        Hyperglycemia/Diabetes Medications:   Antihyperglycemics (From admission, onward)    Start     Stop Route Frequency Ordered    03/29/19 2015  insulin regular (Humulin R) 100  Units in sodium chloride 0.9% 100 mL infusion     Question:  Insulin rate changes (DO NOT MODIFY ANSWER)  Answer:  \\[email or web address]\[email or web address]\InsulinInfusions\CTS INSULIN [id number]    -- IV Continuous 03/29/19 2005             PHYSICAL EXAMINATION:  Vitals:    03/30/19 1010   BP:    Pulse: 99   Resp: (!) 8   Temp:      Body mass index is 33.82 kg/m².    Physical Exam   Constitutional: Well developed, well nourished. In critical condition.   ENT: External ears no masses with nose patent.  Neck: Supple; trachea midline.   Cardiovascular: paced/ VA ECMO,  Doppler pulse to RLE.   Lungs: Intubated on ventilator; lungs anterior bilaterally clear to auscultation.  Abdomen: Soft, no masses, no hernias Hypoactive bowel sounds.   MS: No clubbing or cyanosis of nails noted;  unable to assess gait.  Skin: No rashes, lesions, or ulcers; no nodules. Left AKA;dressing in place.   Psychiatric: GENESIS  Neurological: GENESIS.             Labs Reviewed and Include   Recent Labs   Lab 03/30/19  1530   GLU 85   CALCIUM 13.7*   ALBUMIN 2.5*   PROT 4.4*      K 5.5*  5.5*   CO2 17*      BUN 10   CREATININE 2.2*   ALKPHOS 113   ALT 7,850*   AST 15,336*   BILITOT 1.9*     Lab Results   Component Value Date    WBC 17.00 (H) 03/30/2019    HGB 11.5 (L) 03/30/2019    HCT 30 (L) 03/30/2019    MCV 88 03/30/2019    PLT 96 (L) 03/30/2019     No results for input(s): TSH, FREET4 in the last 168 hours.  Lab Results   Component Value Date    HGBA1C 5.3 03/29/2019       Nutritional status:   Body mass index is 33.82 kg/m².  Lab Results   Component Value Date    ALBUMIN 2.5 (L) 03/30/2019    ALBUMIN 3.3 (L) 03/30/2019    ALBUMIN 3.1 (L) 03/30/2019    ALBUMIN 3.1 (L) 03/30/2019     No results found for: PREALBUMIN    Estimated Creatinine Clearance: 51.4 mL/min (A) (based on SCr of 2.2 mg/dL (H)).    Accu-Checks  Recent Labs     03/29/19  2308 03/30/19  0016 03/30/19  0151 03/30/19  0313 03/30/19  0408 03/30/19  0731 03/30/19  0802  03/30/19  0916 03/30/19  1008 03/30/19  1533   POCTGLUCOSE 150* 128* 139* 133* 120* 119* 119* 127* 128* 94        ASSESSMENT and PLAN    * Cardiogenic shock  Managed per primary.   Optimize BG control; avoid hypoglycemia        Acute hyperglycemia  BG goal 140-180    Continue IV insulin infusion protocol; start IV insulin infusion as needed.   Requires intensive BG monitoring while on protocol         ST elevation myocardial infarction (STEMI)  Managed per primary.   S/p PCI        JACOBY (acute kidney injury)  Avoid insulin stacking and hypoglycemia.  Estimated Creatinine Clearance: 43.5 mL/min (A) (based on SCr of 2.6 mg/dL (H)).  CRRT per nephrology.       Lower limb ischemia  Emergent left AKA  Optimize BG for wound healing.             Plan discussed with  RN at bedside.     Latha Little NP  Endocrinology  [facility]

## 2019-03-30 NOTE — PLAN OF CARE
I have spoken with pts wife (in person) and mother (over the phone) and updated them on current clinical status. They were made aware that he is critically ill with multisystem organ failure and an ischemic leg. They understand that without intervention, there is little hope that pt will survive and would like to do everything possible to try to save him, including amputation of the leg.  Dr London has also spoken with his wife and decision made to proceed, understanding that even with surgery he may not survive.

## 2019-03-30 NOTE — PROGRESS NOTES
[facility]  Nephrology  Progress Note    Patient Name: Subhash Ac  MRN: [id number]  Admission Date: 3/29/2019  Hospital Length of Stay: 1 days  Attending Provider: Berlin Calderon MD   Primary Care Physician: Primary Doctor No  Principal Problem:<principal problem not specified>    Subjective:     HPI: Mr. Subhash Ac is a 44 yo male who was admitted ton 3/29 with chief complaint of CP and SOB, found to have STEMI.  He was urgently taken to the cath lab and found to have 100% thrombotic occlusion to the LM, now s/p PCI with RAQUEL with dilation.  An Impella was placed and patient was transferred to ICU for continued management of his cardiogenic shock.  Pressor requirements have continued to increase with rise in lactic acid overnight with continued decline of kidney function to the point of anuria.  Bicarb trending down to 12, ABG with acidemia with pH of 7.1.  Vent settings were adjusted and he was started on a bicarbonate gtt with improvement in his pH to 7.2.  He continues to require Epi/Levo/Vaso for BP support, yet remains hypotensive.  CTS and interventional cardiology both consulted for further help with perfusion, currently being evaluated for Tandem placement either via CT surgery vs. Cath lab.  Nephrology has been consulted for JACOBY in setting of cardiogenic shock.    Interval History: Patient evaluated bedside, critically ill, sedated on mechanical ventilation, on Tandem heart and ECMO.  Patient emergently transferred to surgery for low extremity amputation.    Review of patient's allergies indicates:  No Known Allergies  Current Facility-Administered Medications   Medication Frequency    0.9%  NaCl infusion (CRRT USE ONLY) Continuous    0.9%  NaCl infusion (for blood administration) Q24H PRN    0.9%  NaCl infusion (for blood administration) Q24H PRN    acetaminophen tablet 650 mg Q4H PRN    acetaminophen tablet 650 mg Q6H PRN    aspirin chewable tablet 81 mg Daily     bisacodyl suppository 10 mg Q12H PRN    calcium chloride 100 mg/mL (10 %) injection 1 g Once    calcium chloride 100 mg/mL (10 %) injection 2 g Once    calcium gluconate 2 g in dextrose 5 % 100 mL IVPB Once    calcium gluconate 2,000 mg in dextrose 5 % 100 mL IVPB Once    ceFAZolin injection 2 g Q12H    cisatracurium (NIMBEX) 200 mg in dextrose 5 % 100 mL infusion Continuous    clopidogrel tablet 75 mg Daily    dexmedetomidine (PRECEDEX) 400mcg/100mL 0.9% NaCL infusion Continuous    dextrose 5 % and 0.9 % NaCl with KCl 20 mEq infusion Continuous    dextrose 50% injection 12.5 g PRN    dextrose 50% injection 25 g PRN    DOBUTamine 500mg in D5W 250mL infusion (premix) (NON-TITRATING) Continuous    EPINEPHrine (ADRENALIN) 10 mg in sodium chloride 0.9% 250 mL infusion Continuous    fluconazole (DIFLUCAN) IVPB 400 mg 200 mL Q24H    heparin (porcine) 25,000 Units in dextrose 5 % 500 mL infusion Continuous    heparin 25,000 units in dextrose 5% 250 mL (100 units/mL) infusion (heparin infusion) Continuous    hydrocortisone sodium succinate injection 100 mg Q8H    insulin regular (Humulin R) 100 Units in sodium chloride 0.9% 100 mL infusion Continuous    magnesium sulfate 2g in water 50mL IVPB (premix) PRN    mupirocin 2 % ointment 1 g BID    niCARdipine 40 mg/200 mL infusion Continuous    nitric oxide gas Gas 10 ppm Continuous    norepinephrine 16 mg in dextrose 5 % 250 mL infusion Continuous    ondansetron disintegrating tablet 8 mg Q8H PRN    ondansetron injection 4 mg Q12H PRN    oxyCODONE immediate release tablet 5 mg Q4H PRN    oxyCODONE immediate release tablet Tab 10 mg Q4H PRN    pantoprazole injection 40 mg BID    polyethylene glycol packet 17 g Daily    potassium chloride 10 mEq in 100 mL IVPB PRN    And    potassium chloride 10 mEq in 100 mL IVPB PRN    And    potassium chloride 10 mEq in 100 mL IVPB PRN    potassium chloride 20 mEq in 100 mL IVPB (FOR CENTRAL LINE ADMINISTRATION  ONLY) PRN    And    potassium chloride 40 mEq in 100 mL IVPB (FOR CENTRAL LINE ADMINISTRATION ONLY) PRN    And    potassium chloride 20 mEq in 100 mL IVPB (FOR CENTRAL LINE ADMINISTRATION ONLY) PRN    propofol (DIPRIVAN) 10 mg/mL infusion Continuous    sodium bicarbonate 1 mEq/mL (8.4 %) 150 mEq in dextrose 5 % 1,000 mL infusion Continuous    sodium bicarbonate solution 50 mEq Once    sodium phosphate 15 mmol in dextrose 5 % 250 mL IVPB PRN    sodium phosphate 20.01 mmol in dextrose 5 % 250 mL IVPB PRN    sodium phosphate 30 mmol in dextrose 5 % 250 mL IVPB PRN    vancomycin 750 mg in dextrose 5 % 250 mL IVPB (ready to mix system) Q24H    vasopressin (PITRESSIN) 0.2 Units/mL in dextrose 5 % 100 mL infusion Continuous       Objective:     Vital Signs (Most Recent):  Temp: 98.8 °F (37.1 °C) (03/30/19 1100)  Pulse: 99 (03/30/19 1110)  Resp: 10 (03/30/19 1110)  BP: (!) 70/0 (03/30/19 0700)  SpO2: 99 % (03/30/19 1110)  O2 Device (Oxygen Therapy): ventilator (03/30/19 1110) Vital Signs (24h Range):  Temp:  [97.7 °F (36.5 °C)-103.8 °F (39.9 °C)] 98.8 °F (37.1 °C)  Pulse:  [] 99  Resp:  [0-69] 10  SpO2:  [35 %-100 %] 99 %  BP: (68-70)/(0) 70/0  Arterial Line BP: ()/() 69/65     Weight: 103.9 kg (229 lb) (03/29/19 1000)  Body mass index is 33.82 kg/m².  Body surface area is 2.25 meters squared.    I/O last 3 completed shifts:  In: 86725.4 [I.V.:32436.4; Blood:1147]  Out: 3666 [Urine:58; Drains:350; Other:3244; Blood:14]    Physical Exam   Constitutional: He appears well-developed. He appears ill. He is sedated and intubated.   HENT:   Head: Normocephalic and atraumatic.   Right Ear: External ear normal.   Left Ear: External ear normal.   Eyes: Conjunctivae are normal. Right eye exhibits no discharge. Left eye exhibits no discharge.   Cardiovascular: Tachycardia present.   Murmur heard.  Pulmonary/Chest: Tachypnea noted. He is intubated. He is in respiratory distress. He has rales.   Vent  transmitted breath sounds   Abdominal: He exhibits distension.   Musculoskeletal: He exhibits edema. He exhibits no deformity.   Skin: He is not diaphoretic. There is pallor.       Significant Labs:  CBC:   Recent Labs   Lab 03/30/19  0800  03/30/19  1057   WBC 19.69*  --   --    RBC 3.92*  --   --    HGB 11.4*  --   --    HCT 34.0*   < > 32*   *  --   --    MCV 87  --   --    MCH 29.1  --   --    MCHC 33.5  --   --     < > = values in this interval not displayed.     CMP:   Recent Labs   Lab 03/30/19  0800   *   CALCIUM 8.3*   ALBUMIN 3.3*   PROT 5.6*      K 5.6*  5.6*   CO2 14*   CL 98   BUN 12   CREATININE 2.6*   ALKPHOS 143*   ALT 11,310*   AST 19,061*   BILITOT 1.9*     All labs within the past 24 hours have been reviewed.     Significant Imaging:  CXR personally reviewed.    Assessment/Plan:     JACOBY (acute kidney injury)  Anuric JACOBY 2/2 Ischemic ATN from Cardiogenic Shock    44 yo male presenting to hospital with chief complaint of SOB/CP found to have STEMI.  Taken urgently to cathlab, found to have 100% occlusion to LM, now s/p PCI with RAQUEL.  Impella was placed for cardiogenic shock, noted to be in biventricular failure.  CTS/IC evaluation for possible Tandem placement.    Plan/Recommendations:  - Discuss with primary team at bedside, Dr. Tom, and my staff Dr. Horton the need for SLED treatment intraop as patient emergently being transferred for surgery  - Will provide SLED intraop, and will provide high Ca bath as requested by Dr. Tom and cardio team; RRT intraop to be managed by anesthesiology  - Patient critically ill, poor prognosis  - Will follow closely  - Strict I/Os and chart  - Once out of surgery and hemodynamically stable, will reevaluate for continued RRT / SLED treatment    ST elevation myocardial infarction (STEMI)  - Managed by primary team    Cardiogenic shock  - managed by primary team        Thank you for your consult. I will follow-up with patient. Please  contact us if you have any additional questions.    Jose Shetty MD  Nephrology  [facility]

## 2019-03-30 NOTE — ASSESSMENT & PLAN NOTE
43 y.o. male s/p STEMI and taken for LHC w PCI to LM-LAD, IABP placement and immediate removal followed by placement of Impella CP. Patient then placed on Tandem heart VAD with ECMO on 3/29.    Neuro:   - Intubated and sedated on Propofol gtt  - On Nimbex  - Off Precedex  - Wean sedation, Nimbex per CTS    Pulmonary:   - Intubated  - ECMO running  - Daily CXRs while intubated  - ABGs PRN  - BTs, DuoNebs    Cardiac:   - 3/28-3/29: code STEMI and taken for LHC w PCI to LM-LAD, IABP placement and immediate removal followed by placement of Impella CP. Continued heart failure then led to placement of Tandem heart VAD with ECMO.   - Epi, Norepi, Vaso gtt's  - NO at 10ppm  - Wean pressors per CTS  - Monitor chest tube output  - Anticoagulation per CTS    Renal:    - Anuric on CRRT at 200 UF rate  - Trend BUN/Cr  - Nephrology on board    ID:   - Tmax 103.8  - Leukocytosis 26 postop, continue to trend  - Trend WBC daily  - Ancef    Hem/Onc:   - H/H stable  - Transfuse per CTS  - heparin gtt    Endocrine:    - Insulin gtt for glucose control  - Endocrine consulted, appreciate assistance    Fluids/Electrolytes/Nutrition/GI:   - Replace electrolytes PRN  - mIVF per CTS  - Currently NPO, diet per CTS    PPx:  - DVT: heparin gtt, SCDs    DISPO:  - Continue SICU care  - Wean pressors, sedation, vent requirements per CTS    Primary: CTS
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
## 2019-03-30 NOTE — SUBJECTIVE & OBJECTIVE
Interval History: Patient evaluated bedside, critically ill, sedated on mechanical ventilation, on Tandem heart and ECMO.  Patient emergently transferred to surgery for low extremity amputation.    Review of patient's allergies indicates:  No Known Allergies  Current Facility-Administered Medications   Medication Frequency    0.9%  NaCl infusion (CRRT USE ONLY) Continuous    0.9%  NaCl infusion (for blood administration) Q24H PRN    0.9%  NaCl infusion (for blood administration) Q24H PRN    acetaminophen tablet 650 mg Q4H PRN    acetaminophen tablet 650 mg Q6H PRN    aspirin chewable tablet 81 mg Daily    bisacodyl suppository 10 mg Q12H PRN    calcium chloride 100 mg/mL (10 %) injection 1 g Once    calcium chloride 100 mg/mL (10 %) injection 2 g Once    calcium gluconate 2 g in dextrose 5 % 100 mL IVPB Once    calcium gluconate 2,000 mg in dextrose 5 % 100 mL IVPB Once    ceFAZolin injection 2 g Q12H    cisatracurium (NIMBEX) 200 mg in dextrose 5 % 100 mL infusion Continuous    clopidogrel tablet 75 mg Daily    dexmedetomidine (PRECEDEX) 400mcg/100mL 0.9% NaCL infusion Continuous    dextrose 5 % and 0.9 % NaCl with KCl 20 mEq infusion Continuous    dextrose 50% injection 12.5 g PRN    dextrose 50% injection 25 g PRN    DOBUTamine 500mg in D5W 250mL infusion (premix) (NON-TITRATING) Continuous    EPINEPHrine (ADRENALIN) 10 mg in sodium chloride 0.9% 250 mL infusion Continuous    fluconazole (DIFLUCAN) IVPB 400 mg 200 mL Q24H    heparin (porcine) 25,000 Units in dextrose 5 % 500 mL infusion Continuous    heparin 25,000 units in dextrose 5% 250 mL (100 units/mL) infusion (heparin infusion) Continuous    hydrocortisone sodium succinate injection 100 mg Q8H    insulin regular (Humulin R) 100 Units in sodium chloride 0.9% 100 mL infusion Continuous    magnesium sulfate 2g in water 50mL IVPB (premix) PRN    mupirocin 2 % ointment 1 g BID    niCARdipine 40 mg/200 mL infusion Continuous     nitric oxide gas Gas 10 ppm Continuous    norepinephrine 16 mg in dextrose 5 % 250 mL infusion Continuous    ondansetron disintegrating tablet 8 mg Q8H PRN    ondansetron injection 4 mg Q12H PRN    oxyCODONE immediate release tablet 5 mg Q4H PRN    oxyCODONE immediate release tablet Tab 10 mg Q4H PRN    pantoprazole injection 40 mg BID    polyethylene glycol packet 17 g Daily    potassium chloride 10 mEq in 100 mL IVPB PRN    And    potassium chloride 10 mEq in 100 mL IVPB PRN    And    potassium chloride 10 mEq in 100 mL IVPB PRN    potassium chloride 20 mEq in 100 mL IVPB (FOR CENTRAL LINE ADMINISTRATION ONLY) PRN    And    potassium chloride 40 mEq in 100 mL IVPB (FOR CENTRAL LINE ADMINISTRATION ONLY) PRN    And    potassium chloride 20 mEq in 100 mL IVPB (FOR CENTRAL LINE ADMINISTRATION ONLY) PRN    propofol (DIPRIVAN) 10 mg/mL infusion Continuous    sodium bicarbonate 1 mEq/mL (8.4 %) 150 mEq in dextrose 5 % 1,000 mL infusion Continuous    sodium bicarbonate solution 50 mEq Once    sodium phosphate 15 mmol in dextrose 5 % 250 mL IVPB PRN    sodium phosphate 20.01 mmol in dextrose 5 % 250 mL IVPB PRN    sodium phosphate 30 mmol in dextrose 5 % 250 mL IVPB PRN    vancomycin 750 mg in dextrose 5 % 250 mL IVPB (ready to mix system) Q24H    vasopressin (PITRESSIN) 0.2 Units/mL in dextrose 5 % 100 mL infusion Continuous       Objective:     Vital Signs (Most Recent):  Temp: 98.8 °F (37.1 °C) (03/30/19 1100)  Pulse: 99 (03/30/19 1110)  Resp: 10 (03/30/19 1110)  BP: (!) 70/0 (03/30/19 0700)  SpO2: 99 % (03/30/19 1110)  O2 Device (Oxygen Therapy): ventilator (03/30/19 1110) Vital Signs (24h Range):  Temp:  [97.7 °F (36.5 °C)-103.8 °F (39.9 °C)] 98.8 °F (37.1 °C)  Pulse:  [] 99  Resp:  [0-69] 10  SpO2:  [35 %-100 %] 99 %  BP: (68-70)/(0) 70/0  Arterial Line BP: ()/() 69/65     Weight: 103.9 kg (229 lb) (03/29/19 1000)  Body mass index is 33.82 kg/m².  Body surface area is 2.25  meters squared.    I/O last 3 completed shifts:  In: 38489.4 [I.V.:28965.4; Blood:1147]  Out: 3666 [Urine:58; Drains:350; Other:3244; Blood:14]    Physical Exam   Constitutional: He appears well-developed. He appears ill. He is sedated and intubated.   HENT:   Head: Normocephalic and atraumatic.   Right Ear: External ear normal.   Left Ear: External ear normal.   Eyes: Conjunctivae are normal. Right eye exhibits no discharge. Left eye exhibits no discharge.   Cardiovascular: Tachycardia present.   Murmur heard.  Pulmonary/Chest: Tachypnea noted. He is intubated. He is in respiratory distress. He has rales.   Vent transmitted breath sounds   Abdominal: He exhibits distension.   Musculoskeletal: He exhibits edema. He exhibits no deformity.   Skin: He is not diaphoretic. There is pallor.       Significant Labs:  CBC:   Recent Labs   Lab 03/30/19  0800  03/30/19  1057   WBC 19.69*  --   --    RBC 3.92*  --   --    HGB 11.4*  --   --    HCT 34.0*   < > 32*   *  --   --    MCV 87  --   --    MCH 29.1  --   --    MCHC 33.5  --   --     < > = values in this interval not displayed.     CMP:   Recent Labs   Lab 03/30/19  0800   *   CALCIUM 8.3*   ALBUMIN 3.3*   PROT 5.6*      K 5.6*  5.6*   CO2 14*   CL 98   BUN 12   CREATININE 2.6*   ALKPHOS 143*   ALT 11,310*   AST 19,061*   BILITOT 1.9*     All labs within the past 24 hours have been reviewed.     Significant Imaging:  CXR personally reviewed.

## 2019-03-30 NOTE — ANESTHESIA PROCEDURE NOTES
MALI    Diagnosis: CAD  Patient location during procedure: OR  Procedure start time: 3/30/2019 1:01 PM  Timeout: 3/30/2019 1:00 PM  Procedure end time: 3/30/2019 1:20 PM  Exam type: Baseline  Staffing  Anesthesiologist: Harley Norman MD  Performed: fellow   Preanesthetic Checklist  Completed: patient identified, surgical consent, pre-op evaluation, timeout performed, risks and benefits discussed, monitors and equipment checked, anesthesia consent given, oxygen available, suction available, hand hygiene performed and patient being monitored  Setup & Induction  Patient preparation: bite block inserted  Probe Insertion: easy  Exam: complete      Findings  Impression  Other Findings  EF <5%. SEC noted in LV with likely thrombus attached to ventricular side of AV. AV not opening. Pericardial and pleural effusions noted. RV function severely decreased.   Probe Removal      Exam     Left Heart  Left Atruim: normal and spontaneous echo contrast in appendage      LV Miscellaneous: SEC    LVAD:no  Estimated Ejection Fraction: < 25% severe (3)  Regional Wall Abnormalities: RWMA present            Right Heart  Right Ventricle Function: severely decreased    Intra Atrial Septum          Right Ventricle    Aortic Valve:  Morphology: trileaflet    Mitral Valve:  Morphology:normal    Tricuspid Valve:  Morphology: normal    Pulmonic Valve:  Morphology:normal          Effusions  Effusions: left pleural, right pleural and pericardial    Summary  Findings discussed with surgeon.    Other Findings   EF <5%. SEC noted in LV with likely thrombus attached to ventricular side of AV. AV not opening. Pericardial and pleural effusions noted. RV function severely decreased.

## 2019-03-30 NOTE — PROGRESS NOTES
Intra-Operative HD started per Nephrology orders, via Lt IJ CVC with lines reversed (due to sluggish Arterial/Red port) & secured. Adjustable Anesthesia orders verified & acknowledged with pre-treatment report received from Dr. London. Will monitor patient's treatment progress closely while on HD. See HD flowsheet for treatment details.
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
## 2019-03-30 NOTE — ASSESSMENT & PLAN NOTE
Avoid insulin stacking and hypoglycemia.  Estimated Creatinine Clearance: 43.5 mL/min (A) (based on SCr of 2.6 mg/dL (H)).  CRRT per nephrology.

## 2019-03-30 NOTE — ASSESSMENT & PLAN NOTE
BG goal 140-180    Continue IV insulin infusion protocol; start IV insulin infusion as needed.   Requires intensive BG monitoring while on protocol
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
## 2019-03-30 NOTE — ASSESSMENT & PLAN NOTE
BG goal 140-180    Discontinue IV insulin.   BG monitoring every 4 hours and low dose correction scale.

## 2019-03-30 NOTE — HPI
Reason for Consult: Management of Hyperglycemia     Surgical Procedure and Date: Left heart cath, percutaneous coronary intervention, Placement, IABP and removal; INSERTION, IMPELLA (N/A) 3/29/19; Left above knee amputation 3/30/19        HPI:   Patient is a 43 y.o. male with a diagnosis of STEMI and cardiogenic shock. Patient presented with diaphoresis and severe chest pain. Patient to cath lab; PCI, IABP (placed and removed), and placement of Impella. No personal history of DM per chart review. Endocrinology consulted for BG management.      Lab Results   Component Value Date    HGBA1C 5.3 03/29/2019     Of note post-operatively (3/30/19): placed on tandem heart VAD with ECMO overnight, CRRT, shock liver, and left leg ischemia which required emergent AKA.

## 2019-03-30 NOTE — HPI
43 year old male admitted for code STEMI and taken for [facility] w PCI to LM-LAD, IABP placement and immediate removal followed by placement of Impella CP. He received escalating doses of epinephrine without significant improvement of his cardiogenic shock. By the time of our encounter his SVO2 was 52 w calculated CO of 4.03 / CI 1.8, deteriorating renal function and hemolysis.

## 2019-03-30 NOTE — SIGNIFICANT EVENT
Pt placed on tandem life system (VA ECMO).    31fr Protek Duo cannula placed at 43cm in RIJV pulling from RA and PA.  Lot# [id number]  Exp: 4-  17fr arterial cannula returning to [facility]  Lot# [id number]  Exp: 12-7-2020    Tandem Life pump/oxygenator combo:  Lot# [id number]  Exp: 4-    Transferred to SICU at 2015.    Converted to Centrimag at 2350.

## 2019-03-30 NOTE — PROGRESS NOTES
Pt. Placed on full ECMO at 2350.  Was on Tandem with an Oxygenator.  Cannulas RIJ=31, RFA=17.  Dr. London placed pt. On ECMO.  Please see Drs. Notes and RN notes.  Collin was the Perfusionist and see his notes.  RPMs now are 3600 with a flow of 3.5 lpm.  100% O2.  Sweep 5.5 lpm.

## 2019-03-30 NOTE — HOSPITAL COURSE
Admitted on 3/29/19 for STEMI and taken for [facility] w PCI to LM-LAD, IABP placement and immediate removal followed by placement of Impella CP. Patient then placed on Tandem heart VAD with ECMO. Transferred to SICU for postop care.

## 2019-03-30 NOTE — ASSESSMENT & PLAN NOTE
Neuro:   - Intubated and sedated on Propofol gtt  - On Nimbex- off for some period overnight but restarted due to inability to ventilate  - Keep sedation currently     Pulmonary:   - Intubated  Vent Mode: A/C  Oxygen Concentration (%):  [] 51  Resp Rate Total:  [10 br/min-49 br/min] 10 br/min  Vt Set:  [300 mL-520 mL] 300 mL  PEEP/CPAP:  [5 cmH20-7.5 cmH20] 5 cmH20  Pressure Support:  [0 cmH20] 0 cmH20  Mean Airway Pressure:  [7 suN87-94 cmH20] 7 cmH20  - ECMO running- started overnight 3/29  - Daily CXRs while intubated  - ABGs PRN  - BTs, DuoNebs     Cardiac:  - Impella in place, ECMO   - Epi 0.06 , Norepi 0.04, Vaso 0.04, wean as tolerated  - NO at 18ppm  - Monitor chest tube output  - Anticoagulation per CTS     -Left leg ischemia from Impella catheter vs. Embolic event. Vascular consulted. Did not believed viable on clinical exam. No OR intervention per vascular surgery at this time.     Renal:    - Anuric on CRRT at 200 UF rate  - Trend BUN/Cr  - Nephrology following      ID:   - Afebrile with tax 103.8  - Leukocytosis 22 from 25  - Trend WBC daily  - Ancef, diflucan      Hem/Onc:   - H/H stable 11.3/34.3  - Has not required transfusion overnight   - heparin gtt increased to 500 this morning from 300     Endocrine:    - Insulin gtt for glucose control  - Endocrine consulted, appreciate assistance     Fluids/Electrolytes/Nutrition/GI:   - NPO  - Replace electrolytes PRN  - bicarb gtt overnight at 150  -Patient with shock liver- with AST and ALT increasing currently 15k and 9k respectively. Tbili     PPx:  - DVT: heparin gtt, SCDs     DISPO:  - Continue SICU care. Patient in multiorgan failure.   - To OR today for Class A LLE amputation likely AKA. Patient's family consented for procedure

## 2019-03-31 PROBLEM — I50.9 MULTI-ORGAN FAILURE WITH HEART FAILURE: Status: ACTIVE | Noted: 2019-01-01

## 2019-03-31 NOTE — SUBJECTIVE & OBJECTIVE
"Interval HPI:   Overnight events: Remains in ICU, intubated and sedated. ECMO. Hydrocortisone every 8 hours. BG below goal without insulin.   Eating:   NPO  Nausea: No  Hypoglycemia and intervention: No  Fever: No  TPN and/or TF: No    BP (!) 70/0 (BP Location: Right arm, Patient Position: Lying)   Pulse 63   Temp 98.9 °F (37.2 °C) (Oral)   Resp 10   Ht 5' 9" (1.753 m)   Wt 106 kg (233 lb 11 oz)   SpO2 100%   BMI 34.51 kg/m²     Labs Reviewed and Include    Recent Labs   Lab 03/31/19  0420 03/31/19  0759   *  114* 104  104   CALCIUM 9.1  9.1 10.4  10.4   ALBUMIN 3.0*  3.0* 2.9*  2.9*   PROT 5.0* 4.8*   *  135* 135*  135*   K 4.8  4.8 4.7  4.7  4.7   CO2 20*  20* 21*  21*     102 104  104   BUN 8  8 8  8   CREATININE 1.4  1.4 1.4  1.4   ALKPHOS 111 119   ALT 5,115*  --    AST 13,541*  --    BILITOT 4.4* 4.7*     Lab Results   Component Value Date    WBC 13.02 (H) 03/31/2019    HGB 10.0 (L) 03/31/2019    HCT 25 (L) 03/31/2019    MCV 86 03/31/2019    PLT 52 (L) 03/31/2019     No results for input(s): TSH, FREET4 in the last 168 hours.  Lab Results   Component Value Date    HGBA1C 5.3 03/29/2019       Nutritional status:   Body mass index is 34.51 kg/m².  Lab Results   Component Value Date    ALBUMIN 2.9 (L) 03/31/2019    ALBUMIN 2.9 (L) 03/31/2019    ALBUMIN 3.0 (L) 03/31/2019    ALBUMIN 3.0 (L) 03/31/2019     No results found for: PREALBUMIN    Estimated Creatinine Clearance: 81.6 mL/min (based on SCr of 1.4 mg/dL).    Accu-Checks  Recent Labs     03/30/19  1533 03/30/19  1724 03/30/19  1803 03/30/19  1912 03/30/19 2012 03/30/19  2211 03/30/19  2357 03/31/19  0357 03/31/19  0608 03/31/19  0759   POCTGLUCOSE 94 100 102 101 105 116* 94 124* 114* 107       Current Medications and/or Treatments Impacting Glycemic Control  Immunotherapy:    Immunosuppressants     None        Steroids:   Hormones (From admission, onward)    Start     Stop Route Frequency Ordered    03/29/19 2330 "  hydrocortisone sodium succinate injection 100 mg      -- IV Every 8 hours 03/29/19 2319 03/29/19 2015  vasopressin (PITRESSIN) 0.2 Units/mL in dextrose 5 % 100 mL infusion      -- IV Continuous 03/29/19 2005 03/29/19 1148  vasopressin (PITRESSIN) 20 unit/mL injection     Note to Pharmacy:  Created by cabinet override    03/29 2359 03/29/19 1148        Pressors:    Autonomic Drugs (From admission, onward)    Start     Stop Route Frequency Ordered    03/29/19 2045  EPINEPHrine (ADRENALIN) 10 mg in sodium chloride 0.9% 250 mL infusion     Question Answer Comment   Titrate by: (in mcg/kg/min) 0.05    Titrate interval: (in minutes) 5    Titrate to maintain: (SBP or MAP or Cardiac Index) SBP    Maximum dose: (in mcg/kg/min) 2        -- IV Continuous 03/29/19 2044 03/29/19 2045  norepinephrine 16 mg in dextrose 5 % 250 mL infusion     Question Answer Comment   Titrate by: (in mcg/kg/min) 0.05    Titrate interval: (in minutes) 5    Titrate to maintain: (MAP or SBP) MAP    Greater than: (in mmHg) 65    Maximum dose: (in mcg/kg/min) 3        -- IV Continuous 03/29/19 2044 03/29/19 2040  cisatracurium (NIMBEX) 200 mg in dextrose 5 % 100 mL infusion     Question Answer Comment   Bolus over 1 minute (in mg): 0    Begin at (in mcg/kg/min): 2    Titrate by: (in mcg/kg/min) 1    Titrate interval: (in minutes) 5    To maintain a train-of-four of (TOF_/4): 0/4    Maximum dose: (in mcg/kg/min) 15        -- IV Continuous 03/29/19 2040 03/29/19 1938  norepinephrine 1 mg/mL injection     Note to Pharmacy:  Created by cabinet override    03/30 0744   03/29/19 1938 03/29/19 1901  norepinephrine 1 mg/mL injection     Note to Pharmacy:  Created by cabinet override    03/30 0714   03/29/19 1901 03/29/19 1655  norepinephrine 1 mg/mL injection     Note to Pharmacy:  Created by cabinet override    03/30 0459   03/29/19 1655    03/29/19 1148  norepinephrine 1 mg/mL injection     Note to Pharmacy:  Created by cabinet  override    03/29 2359   03/29/19 1148    03/29/19 0558  EPINEPHrine (ADRENALIN) 1 mg/mL injection     Note to Pharmacy:  Created by cabinet override    03/29 1814   03/29/19 0558        Hyperglycemia/Diabetes Medications:   Antihyperglycemics (From admission, onward)    Start     Stop Route Frequency Ordered    03/29/19 2015  insulin regular (Humulin R) 100 Units in sodium chloride 0.9% 100 mL infusion     Question:  Insulin rate changes (DO NOT MODIFY ANSWER)  Answer:  [email or web address]    -- IV Continuous 03/29/19 2005

## 2019-03-31 NOTE — ASSESSMENT & PLAN NOTE
Avoid insulin stacking and hypoglycemia.  Estimated Creatinine Clearance: 87.9 mL/min (based on SCr of 1.3 mg/dL).  CRRT per nephrology.

## 2019-03-31 NOTE — PROGRESS NOTES
03/30/2019  Harley Valdovinos    Current provider:  Berlin Calderon MD      I, Harley Valdovinos, rounded on Subhash Ac to ensure all mechanical assist device settings (IABP or VAD) were appropriate and all parameters were within limits.  I was able to ensure all back up equipment was present, the staff had no issues, and the Perfusion Department daily rounding was complete.    11:57 PM

## 2019-03-31 NOTE — PLAN OF CARE
Problem: Adult Inpatient Plan of Care  Goal: Plan of Care Review  Outcome: Ongoing (interventions implemented as appropriate)  Patient remains intubated, A/C 40% O2 5 Peep. Nitric @ 3. VA ECMO remains in place. ECMO speed 4500, Flows 4-5. Gtts: Epi, Levo, Vaso, Propofol, Heparin & Acetylcysteine. Pt w/ continuous CRRT, UF titrated to maintain MAP >60. L AKA incision remains clean & dry, wound vac in placed, no drainage. Patient and spouse updated with plan of care.
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
## 2019-03-31 NOTE — ASSESSMENT & PLAN NOTE
Mr. Doe is a 43yoM admitted w STEMI underwent PCI to LM/LAD and developed cardiogenic shock, briefly on IABP, followed by Impella CP and high doses of epinephrine. Eventually progressing to multiple organ failure requiring Tandem Heart VAD with VA-ECMO on 03/29//19. Further complicated by developing ALI of the LLE s/p urgent Left AKA with CTS on 03/30/19. Since amputation improvement of his hemodynamic status with adequate ECMO flows ~ 5L and speed 4300. Clearing Lactic acid, LFTs and CPK down trending. Remained intubation, off sedation in the morning to evaluate for neurological recovery.       - CTS primary Dr. Worrell / Lita   - HTS will follow along, please call for any questions or concerns  - Wean off vasopressor support as tolerated   - Recommend continued optimization from his Cardiogenic shock standpoint  - Agree with holding sedation to evaluation neurological status  - Monitor Lactic, CMP, and CPK until normalizes   - Wean off Vent support as tolerated     Discussed with family at bedside.

## 2019-03-31 NOTE — SUBJECTIVE & OBJECTIVE
Interval History:   Emergently taken to LLE amputation for ALI, since improved HD ecmo flow ~5L on speed of 4300. Sedation turned off with minimal responsive.     Continuous Infusions:   sodium chloride 0.9% 200 mL/hr at 03/31/19 1139    calcium gluconate IVPB      cisatracurium (NIMBEX) infusion 1 mcg/kg/min (03/30/19 1129)    dexmedetomidine (PRECEDEX) infusion Stopped (03/29/19 2200)    dextrose 5 % and 0.9 % NaCl with KCl 20 mEq 5 mL/hr (03/29/19 2119)    DOBUTamine Stopped (03/30/19 0000)    epinephrine infusion 0.06 mcg/kg/min (03/31/19 1300)    heparin (porcine) in 5 % dex 1,700 Units/hr (03/31/19 1300)    niCARdipine Stopped (03/29/19 2015)    nitric oxide gas      norepinephrine bitartrate-D5W 0.08 mcg/kg/min (03/31/19 1300)    propofol 35 mcg/kg/min (03/31/19 1300)    vasopressin (PITRESSIN) infusion 0.05 Units/min (03/31/19 1300)     Scheduled Meds:   aspirin  81 mg Oral Daily    calcium gluconate IVPB  2,000 mg Intravenous Once    ceFAZolin (ANCEF) IVPB  2 g Intravenous Q12H    clopidogrel  75 mg Oral Daily    fluconazole (DIFLUCAN) IVPB  400 mg Intravenous Q24H    hydrocortisone sodium succinate  100 mg Intravenous Q8H    insulin aspart U-100  0-5 Units Subcutaneous Q4H    mupirocin  1 g Nasal BID    pantoprazole  40 mg Intravenous BID    polyethylene glycol  17 g Oral Daily    sodium bicarbonate  50 mEq Intravenous Once    vancomycin (VANCOCIN) IVPB (custom)  1,500 mg Intravenous Q24H     PRN Meds:sodium chloride, sodium chloride, sodium chloride, sodium chloride, sodium chloride, sodium chloride, sodium chloride, sodium chloride, sodium chloride, sodium chloride, acetaminophen, acetaminophen, bisacodyl, dextrose 50%, glucagon (human recombinant), magnesium sulfate IVPB, ondansetron, ondansetron, oxyCODONE, oxyCODONE, potassium chloride in water **AND** potassium chloride in water **AND** potassium chloride in water, potassium chloride in water **AND** potassium chloride in  water **AND** potassium chloride in water    Review of patient's allergies indicates:  No Known Allergies  Objective:     Vital Signs (Most Recent):  Temp: 98.4 °F (36.9 °C) (03/31/19 1100)  Pulse: 71 (03/31/19 1430)  Resp: 10 (03/31/19 1430)  BP: (!) 70/0 (03/30/19 0700)  SpO2: 100 % (03/31/19 1100) Vital Signs (24h Range):  Temp:  [98.2 °F (36.8 °C)-98.9 °F (37.2 °C)] 98.4 °F (36.9 °C)  Pulse:  [] 71  Resp:  [0-36] 10  SpO2:  [74 %-100 %] 100 %  Arterial Line BP: ()/() 71/67     Patient Vitals for the past 72 hrs (Last 3 readings):   Weight   03/31/19 0500 106 kg (233 lb 11 oz)   03/29/19 1000 103.9 kg (229 lb)   03/29/19 0612 104.3 kg (229 lb 15 oz)     Body mass index is 34.51 kg/m².      Intake/Output Summary (Last 24 hours) at 3/31/2019 1500  Last data filed at 3/31/2019 1400  Gross per 24 hour   Intake 8898 ml   Output 7413 ml   Net 1485 ml       Hemodynamic Parameters:      Physical Exam   Constitutional:   Cool to touch. Intubated and paralyzed   HENT:   Head: Normocephalic and atraumatic.   Cardiovascular:   Impella in place. Patient connected to ECMO circuit    Pulmonary/Chest: No respiratory distress.   Intubated, PEEP minimal vent settings now that on ECMO   Abdominal: He exhibits no distension.   Genitourinary:   Genitourinary Comments: [place] in place   Musculoskeletal:   Paralyzed.   LLE cool to touch. Anterior shin modeled    Skin:   Skin is cool to touch   Psychiatric:   sedated       Significant Labs:  CBC:  Recent Labs   Lab 03/31/19  0420  03/31/19  0759  03/31/19  1109 03/31/19  1213 03/31/19  1311   WBC 13.23*  --  13.02*  --   --  14.15*  --    RBC 3.29*  --  3.32*  --   --  3.47*  --    HGB 9.8*  --  10.0*  --   --  10.2*  --    HCT 28.9*   < > 28.6*   < > 26* 29.9* 27*   PLT 47*  --  52*  --   --  48*  --    MCV 88  --  86  --   --  86  --    MCH 29.8  --  30.1  --   --  29.4  --    MCHC 33.9  --  35.0  --   --  34.1  --     < > = values in this interval not displayed.      BNP:  Recent Labs   Lab 03/29/19  0052   *     CMP:  Recent Labs   Lab 03/31/19  0420 03/31/19  0759 03/31/19  1214   *  114* 104  104 96  96   CALCIUM 9.1  9.1 10.4  10.4 10.1  10.1   ALBUMIN 3.0*  3.0* 2.9*  2.9* 3.3*  3.3*   PROT 5.0* 4.8* 5.1*   *  135* 135*  135* 137  137   K 4.8  4.8 4.7  4.7  4.7 4.6  4.6  4.6  4.6   CO2 20*  20* 21*  21* 19*  19*     102 104  104 104  104   BUN 8  8 8  8 8  8   CREATININE 1.4  1.4 1.4  1.4 1.3  1.3   ALKPHOS 111 119 138*   ALT 5,115* 4,390* 3,895*   AST 13,541* 13,075* 12,197*   BILITOT 4.4* 4.7* 5.5*      Coagulation:   Recent Labs   Lab 03/31/19  0420 03/31/19  0759 03/31/19  1014 03/31/19  1214   INR 3.3* 3.3*  --  3.1*  3.1*   APTT 48.7* 47.2* 47.9*  --      LDH:  Recent Labs   Lab 03/29/19  0804 03/31/19  1300   LDH 3,101* 13,050*     Microbiology:  Microbiology Results (last 7 days)     Procedure Component Value Units Date/Time    Blood culture [[id number]]     Order Status:  Canceled Specimen:  Blood     Blood culture [[id number]]     Order Status:  Canceled Specimen:  Blood           I have reviewed all pertinent labs within the past 24 hours.    Estimated Creatinine Clearance: 87.9 mL/min (based on SCr of 1.3 mg/dL).    Diagnostic Results:  I have reviewed all pertinent imaging results/findings within the past 24 hours.

## 2019-03-31 NOTE — PROGRESS NOTES
CRRT daily checks done. On SLED continuous with uf rate at 350cc/hr. Orders verified and carried out.    Still on 2k/3ca bath

## 2019-03-31 NOTE — ANESTHESIA POSTPROCEDURE EVALUATION
Anesthesia Post Evaluation    Patient: Subhash Ac    Procedure(s) Performed: Procedure(s) (LRB):  Left heart cath (Left)  Percutaneous coronary intervention (N/A)  Placement, IABP  INSERTION, IMPELLA (N/A)  Removal, IABP    Final Anesthesia Type: general  Patient location during evaluation: ICU  Patient participation: No - Unable to Participate, Intubation  Level of consciousness: sedated  Pain management: adequate  Airway patency: patent  PONV status at discharge: No PONV  Anesthetic complications: no      Cardiovascular status: hemodynamically unstable  Respiratory status: intubated  Hydration status: euvolemic  Follow-up not needed.          Vitals Value Taken Time   BP 70/0 3/30/2019  7:00 AM   Temp 36.9 °C (98.4 °F) 3/31/2019  3:00 AM   Pulse 62 3/31/2019  6:19 AM   Resp 0 3/31/2019  6:19 AM   SpO2 100 % 3/31/2019  6:00 AM   Vitals shown include unvalidated device data.      No case tracking events are documented in the log.      Pain/Genevieve Score: No data recorded

## 2019-03-31 NOTE — ASSESSMENT & PLAN NOTE
BG goal 140-180.       Change BG monitoring every 6 hours and low dose correction scale.   No history of DM. BG remain below goal without insulin. Will sign off.     Call if with any questions or concerns; changes in nutrition (TPN, TF) which may elevate BG.

## 2019-03-31 NOTE — SUBJECTIVE & OBJECTIVE
Interval History/Significant Events:     Patient to OR yesterday for left AKA with removal of impella.  Intraoperative echo with severely depressed.      Follow-up For: Procedure(s) (LRB):  LEFT ABOVE KNEE AMPUTATION REMOVAL OF IMPELLA 5.0 (Left)  FASCIOTOMY 3 COMPARTMENT (Left)    Post-Operative Day: 1 Day Post-Op    Objective:     Vital Signs (Most Recent):  Temp: 98.9 °F (37.2 °C) (03/31/19 0700)  Pulse: 63 (03/31/19 0812)  Resp: (!) 0 (03/31/19 0812)  BP: (!) 70/0 (03/30/19 0700)  SpO2: 100 % (03/31/19 0600) Vital Signs (24h Range):  Temp:  [98.1 °F (36.7 °C)-98.9 °F (37.2 °C)] 98.9 °F (37.2 °C)  Pulse:  [] 63  Resp:  [0-33] 0  SpO2:  [74 %-100 %] 100 %  Arterial Line BP: ()/() 64/64     Weight: 106 kg (233 lb 11 oz)  Body mass index is 34.51 kg/m².      Intake/Output Summary (Last 24 hours) at 3/31/2019 0832  Last data filed at 3/31/2019 0800  Gross per 24 hour   Intake 39245 ml   Output 6331 ml   Net 4147 ml       Physical Exam   Constitutional:   Cool to touch. Intubated   HENT:   Head: Normocephalic and atraumatic.   Cardiovascular:   Tachycardic Patient connected to ECMO circuit    Pulmonary/Chest: No respiratory distress.   Intubated, PEEP minimal vent settings now that on ECMO   Abdominal: He exhibits no distension.   Genitourinary:   Genitourinary Comments: [place] in place   Musculoskeletal:   Left AKA with wound vac in place   Neurological:   Not moving extremities with sedation paused.  Will occasionally overbreath vent.    Skin:   Skin is cool to touch   Psychiatric:   sedated       Vents:  Vent Mode: A/C (03/31/19 0812)  Ventilator Initiated: Yes (03/29/19 0333)  Set Rate: 10 bmp (03/31/19 0812)  Vt Set: 300 mL (03/31/19 0812)  Pressure Support: 0 cmH20 (03/29/19 1341)  PEEP/CPAP: 5 cmH20 (03/31/19 0812)  Oxygen Concentration (%): 41 (03/31/19 0812)  Peak Airway Pressure: 42 cmH2O (03/31/19 0812)  Plateau Pressure: 20 cmH20 (03/31/19 0812)  Total Ve: 2.59 mL (03/31/19 0812)  F/VT  Ratio<105 (RSBI): (!) 0 (03/31/19 0812)    Lines/Drains/Airways     Central Venous Catheter Line                 Introducer 03/29/19 2015 left femoral vein 1 day         Percutaneous Central Line Insertion/Assessment - triple lumen  03/29/19 2015 left internal jugular 1 day         Trialysis (Dialysis) Catheter 03/29/19 2015 left internal jugular 1 day          Drain                 NG/OG Tube 03/29/19 0300 Center mouth 2 days          Airway                 Airway - Non-Surgical 03/29/19 0223 Endotracheal Tube 2 days          Arterial Line                 Arterial Line 03/29/19 2015 Right Radial 1 day          Line                 Pacer Wires 03/29/19 2015 1 day         VAD 03/30/19 0000 Right ventricular assist device 1 day          Peripheral Intravenous Line                 Peripheral IV - Single Lumen 03/29/19 0039 Left Antecubital 2 days                Significant Labs:    CBC/Anemia Profile:  Recent Labs   Lab 03/31/19  0000  03/31/19  0420  03/31/19  0709 03/31/19  0759 03/31/19  0803   WBC 12.87*  --  13.23*  --   --  13.02*  --    HGB 9.5*  --  9.8*  --   --  10.0*  --    HCT 27.0*   < > 28.9*   < > 24* 28.6* 25*   PLT 52*  --  47*  --   --  52*  --    MCV 85  --  88  --   --  86  --    RDW 14.5  --  14.7*  --   --  14.8*  --     < > = values in this interval not displayed.        Chemistries:  Recent Labs   Lab 03/30/19  2200 03/31/19  0000 03/31/19  0420     137 137  137 135*  135*   K 4.8  4.8 4.8  4.8 4.8  4.8     100 101  101 102  102   CO2 19*  19* 20*  20* 20*  20*   BUN 9  9 8  8 8  8   CREATININE 1.6*  1.6* 1.5*  1.5* 1.4  1.4   CALCIUM 9.6  9.6 9.2  9.2 9.1  9.1   ALBUMIN 3.2*  3.2* 3.2*  3.2* 3.0*  3.0*   PROT 4.8* 4.8* 5.0*   BILITOT 3.7* 4.0* 4.4*   ALKPHOS 101 100 111   ALT 6,121* 5,475* 5,115*   AST 14,318* 13,761* 13,541*   MG 2.0 2.1 2.2   PHOS 4.1 4.0  4.0 3.6  3.6       ABGs:   Recent Labs   Lab 03/31/19  0818   PH 7.433   PCO2 38.1   HCO3 25.5    POCSATURATED 100   BE 1     Cardiac Markers: No results for input(s): CKMB, TROPONINT, MYOGLOBIN in the last 48 hours.  Coagulation:   Recent Labs   Lab 03/31/19  0420   INR 3.3*   APTT 48.7*       Significant Imaging:  CXR   Endotracheal tube, left-sided central venous catheter, right ECMO catheters, feeding tube relatively stable.  There is severe consolidative opacities throughout the lungs bilaterally slightly increased from prior with near complete opacification.  Clinical correlation advised.  There is suggestion for slight locular peripheral opacities overlying the lungs right greater than left which may represent component of superimposed effusions.  There is no large pneumothorax.  Clinical correlation advised This report was flagged in Epic as abnormal.      Left lower extremity ultrasound  The left common femoral artery was not evaluated secondary to presence of an Impella catheter.  Profunda femoris artery was not evaluated as well secondary to presence of an overlying bandage holding the Impella device in place.  Minimal flow noted in the proximal and mid SFA demonstrating monophasic waveform and measuring 16 cm/sec and 13 cm/sec, respectively.  No flow was seen in the distal SFA, popliteal, PTA or KARAN.

## 2019-03-31 NOTE — PROGRESS NOTES
Patient remains on VA ECMO running at 4500 RPM's for a flow of approximately 4.7 LPM.  Sweep was maintained at 6.5 LPM @ 100%.   Cannula placement confirmed.  ABG's were stretched out to every 4 hours per Dr. London.    Patient had a run of V-Tach today that was self resolved.   Possible CT Scan tomorrow to check neuro status.

## 2019-03-31 NOTE — PT/OT/SLP PROGRESS
Occupational Therapy      Patient Name:  Subhash Ac   MRN:  [id number]     Admitted on 3/29/19 for STEMI and taken for [facility] w PCI to LM-LAD, IABP placement and immediate removal followed by placement of Impella CP. Patient then placed on Tandem heart VAD with ECMO. L AKA 3/30.     OT orders received and acknowledged. Pt remains intubated, sedated, and VA ECMO. Pt is not appropriate for therapy at this time. Will follow-up as appropriate.    Gabriela Schultz OTR/L  [phone]  3/31/2019

## 2019-03-31 NOTE — ASSESSMENT & PLAN NOTE
43 y.o. male s/p STEMI and taken for [facility] w PCI to LM-LAD, IABP placement and immediate removal followed by placement of Impella CP. Patient then placed on Tandem heart VAD with ECMO on 3/29.  Patient taken back to OR for left AKA with removal of impella    Neuro:   - Intubated and sedated on Propofol gtt  - Off of Nimbex  - BIS monitor with depressed activity off sedation      Pulmonary:   - Intubated  - ECMO running  - Daily CXRs while intubated  - ABGs PRN  - BTs, DuoNebs    Cardiac:   - 3/28-3/29: code STEMI and taken for [facility] w PCI to LM-LAD, IABP placement and immediate removal followed by placement of Impella CP. Continued heart failure then led to placement of Tandem heart VAD with ECMO.  Impella removed.  - Epi, Norepi, Vaso gtt's  - NO at 10ppm  - Wean pressors per CTS  - Monitor chest tube output  - Anticoagulation per CTS    Renal:    - Anuric on CRRT   - Trend BUN/Cr  - Nephrology on board    ID:   - Tmax 103.8  - Leukocytosis 26 postop, continue to trenddown currently 13  - Trend WBC daily  - Ancef    Hem/Onc:   - H/H stable  - Transfuse per CTS  - heparin gtt    Endocrine:    - Insulin gtt for glucose control  - Endocrine consulted, appreciate assistance    Fluids/Electrolytes/Nutrition/GI:   - Replace electrolytes PRN  - mIVF per CTS  - Currently NPO, diet per CTS  - LFTs indicative of shock liver, started on NAC    PPx:  - DVT: heparin gtt, SCDs  - PUP:  Protonix    DISPO:  - Continue SICU care  - Goals of care discussion     Primary: CTS

## 2019-03-31 NOTE — PROGRESS NOTES
[facility]  Critical Care - Surgery  Progress Note    Patient Name: Subhash Ac  MRN: [id number]  Admission Date: 3/29/2019  Hospital Length of Stay: 2 days  Code Status: Full Code  Attending Provider: Berlin Calderon MD  Primary Care Provider: Primary Doctor No   Principal Problem: Cardiogenic shock    Subjective:     Hospital/ICU Course:  Admitted on 3/29/19 for STEMI and taken for LHC w PCI to LM-LAD, IABP placement and immediate removal followed by placement of Impella CP. Patient then placed on Tandem heart VAD with ECMO. Transferred to SICU for postop care.         Interval History/Significant Events:     Patient to OR yesterday for left AKA with removal of impella.  Intraoperative echo with severely depressed.      Follow-up For: Procedure(s) (LRB):  LEFT ABOVE KNEE AMPUTATION REMOVAL OF IMPELLA 5.0 (Left)  FASCIOTOMY 3 COMPARTMENT (Left)    Post-Operative Day: 1 Day Post-Op    Objective:     Vital Signs (Most Recent):  Temp: 98.9 °F (37.2 °C) (03/31/19 0700)  Pulse: 63 (03/31/19 0812)  Resp: (!) 0 (03/31/19 0812)  BP: (!) 70/0 (03/30/19 0700)  SpO2: 100 % (03/31/19 0600) Vital Signs (24h Range):  Temp:  [98.1 °F (36.7 °C)-98.9 °F (37.2 °C)] 98.9 °F (37.2 °C)  Pulse:  [] 63  Resp:  [0-33] 0  SpO2:  [74 %-100 %] 100 %  Arterial Line BP: ()/() 64/64     Weight: 106 kg (233 lb 11 oz)  Body mass index is 34.51 kg/m².      Intake/Output Summary (Last 24 hours) at 3/31/2019 0832  Last data filed at 3/31/2019 0800  Gross per 24 hour   Intake 85174 ml   Output 6331 ml   Net 4147 ml       Physical Exam   Constitutional:   Cool to touch. Intubated   HENT:   Head: Normocephalic and atraumatic.   Cardiovascular:   Tachycardic Patient connected to ECMO circuit    Pulmonary/Chest: No respiratory distress.   Intubated, PEEP minimal vent settings now that on ECMO   Abdominal: He exhibits no distension.   Genitourinary:   Genitourinary Comments: Trujillo in place   Musculoskeletal:    Left AKA with wound vac in place   Neurological:   Not moving extremities with sedation paused.  Will occasionally overbreath vent.    Skin:   Skin is cool to touch   Psychiatric:   sedated       Vents:  Vent Mode: A/C (03/31/19 0812)  Ventilator Initiated: Yes (03/29/19 0333)  Set Rate: 10 bmp (03/31/19 0812)  Vt Set: 300 mL (03/31/19 0812)  Pressure Support: 0 cmH20 (03/29/19 1341)  PEEP/CPAP: 5 cmH20 (03/31/19 0812)  Oxygen Concentration (%): 41 (03/31/19 0812)  Peak Airway Pressure: 42 cmH2O (03/31/19 0812)  Plateau Pressure: 20 cmH20 (03/31/19 0812)  Total Ve: 2.59 mL (03/31/19 0812)  F/VT Ratio<105 (RSBI): (!) 0 (03/31/19 0812)    Lines/Drains/Airways     Central Venous Catheter Line                 Introducer 03/29/19 2015 left femoral vein 1 day         Percutaneous Central Line Insertion/Assessment - triple lumen  03/29/19 2015 left internal jugular 1 day         Trialysis (Dialysis) Catheter 03/29/19 2015 left internal jugular 1 day          Drain                 NG/OG Tube 03/29/19 0300 Center mouth 2 days          Airway                 Airway - Non-Surgical 03/29/19 0223 Endotracheal Tube 2 days          Arterial Line                 Arterial Line 03/29/19 2015 Right Radial 1 day          Line                 Pacer Wires 03/29/19 2015 1 day         VAD 03/30/19 0000 Right ventricular assist device 1 day          Peripheral Intravenous Line                 Peripheral IV - Single Lumen 03/29/19 0039 Left Antecubital 2 days                Significant Labs:    CBC/Anemia Profile:  Recent Labs   Lab 03/31/19  0000  03/31/19  0420  03/31/19  0709 03/31/19  0759 03/31/19  0803   WBC 12.87*  --  13.23*  --   --  13.02*  --    HGB 9.5*  --  9.8*  --   --  10.0*  --    HCT 27.0*   < > 28.9*   < > 24* 28.6* 25*   PLT 52*  --  47*  --   --  52*  --    MCV 85  --  88  --   --  86  --    RDW 14.5  --  14.7*  --   --  14.8*  --     < > = values in this interval not displayed.        Chemistries:  Recent Labs   Lab  03/30/19  2200 03/31/19  0000 03/31/19  0420     137 137  137 135*  135*   K 4.8  4.8 4.8  4.8 4.8  4.8     100 101  101 102  102   CO2 19*  19* 20*  20* 20*  20*   BUN 9  9 8  8 8  8   CREATININE 1.6*  1.6* 1.5*  1.5* 1.4  1.4   CALCIUM 9.6  9.6 9.2  9.2 9.1  9.1   ALBUMIN 3.2*  3.2* 3.2*  3.2* 3.0*  3.0*   PROT 4.8* 4.8* 5.0*   BILITOT 3.7* 4.0* 4.4*   ALKPHOS 101 100 111   ALT 6,121* 5,475* 5,115*   AST 14,318* 13,761* 13,541*   MG 2.0 2.1 2.2   PHOS 4.1 4.0  4.0 3.6  3.6       ABGs:   Recent Labs   Lab 03/31/19  0818   PH 7.433   PCO2 38.1   HCO3 25.5   POCSATURATED 100   BE 1     Cardiac Markers: No results for input(s): CKMB, TROPONINT, MYOGLOBIN in the last 48 hours.  Coagulation:   Recent Labs   Lab 03/31/19  0420   INR 3.3*   APTT 48.7*       Significant Imaging:  CXR   Endotracheal tube, left-sided central venous catheter, right ECMO catheters, feeding tube relatively stable.  There is severe consolidative opacities throughout the lungs bilaterally slightly increased from prior with near complete opacification.  Clinical correlation advised.  There is suggestion for slight locular peripheral opacities overlying the lungs right greater than left which may represent component of superimposed effusions.  There is no large pneumothorax.  Clinical correlation advised This report was flagged in Epic as abnormal.      Left lower extremity ultrasound  The left common femoral artery was not evaluated secondary to presence of an Impella catheter.  Profunda femoris artery was not evaluated as well secondary to presence of an overlying bandage holding the Impella device in place.  Minimal flow noted in the proximal and mid SFA demonstrating monophasic waveform and measuring 16 cm/sec and 13 cm/sec, respectively.  No flow was seen in the distal SFA, popliteal, PTA or KARAN.    Assessment/Plan:     ST elevation myocardial infarction (STEMI)  43 y.o. male s/p STEMI and taken for [facility] w  PCI to LM-LAD, IABP placement and immediate removal followed by placement of Impella CP. Patient then placed on Tandem heart VAD with ECMO on 3/29.  Patient taken back to OR for left AKA with removal of impella    Neuro:   - Intubated and sedated on Propofol gtt  - Off of Nimbex  - BIS monitor with depressed activity off sedation      Pulmonary:   - Intubated  - ECMO running  - Daily CXRs while intubated  - ABGs PRN  - BTs, DuoNebs    Cardiac:   - 3/28-3/29: code STEMI and taken for C w PCI to LM-LAD, IABP placement and immediate removal followed by placement of Impella CP. Continued heart failure then led to placement of Tandem heart VAD with ECMO.  Impella removed.  - Epi, Norepi, Vaso gtt's  - NO at 10ppm  - Wean pressors per CTS  - Monitor chest tube output  - Anticoagulation per CTS    Renal:    - Anuric on CRRT   - Trend BUN/Cr  - Nephrology on board    ID:   - Tmax 103.8  - Leukocytosis 26 postop, continue to trenddown currently 13  - Trend WBC daily  - Ancef    Hem/Onc:   - H/H stable  - Transfuse per CTS  - heparin gtt    Endocrine:    - Insulin gtt for glucose control  - Endocrine consulted, appreciate assistance    Fluids/Electrolytes/Nutrition/GI:   - Replace electrolytes PRN  - mIVF per CTS  - Currently NPO, diet per CTS  - LFTs indicative of shock liver, started on NAC    PPx:  - DVT: heparin gtt, SCDs  - PUP:  Protonix    DISPO:  - Continue SICU care  - Goals of care discussion     Primary: CTS         Critical care was time spent personally by me on the following activities: development of treatment plan with patient or surrogate and bedside caregivers, discussions with consultants, evaluation of patient's response to treatment, examination of patient, ordering and performing treatments and interventions, ordering and review of laboratory studies, ordering and review of radiographic studies, pulse oximetry, re-evaluation of patient's condition.  This critical care time did not overlap with that of  any other provider or involve time for any procedures.     Dmitriy Kelly MD  Critical Care - Surgery  [facility]

## 2019-03-31 NOTE — PROGRESS NOTES
"[facility]  Endocrinology  Progress Note    Admit Date: 3/29/2019     Reason for Consult: Management of Hyperglycemia     Surgical Procedure and Date: Left heart cath, percutaneous coronary intervention, Placement, IABP and removal; INSERTION, IMPELLA (N/A) 3/29/19; Left above knee amputation 3/30/19        HPI:   Patient is a 43 y.o. male with a diagnosis of STEMI and cardiogenic shock. Patient presented with diaphoresis and severe chest pain. Patient to cath lab; PCI, IABP (placed and removed), and placement of Impella. No personal history of DM per chart review. Endocrinology consulted for BG management.      Lab Results   Component Value Date    HGBA1C 5.3 03/29/2019     Of note post-operatively (3/30/19): placed on tandem heart VAD with ECMO overnight, CRRT, shock liver, and left leg ischemia which required emergent AKA.         Interval HPI:   Overnight events: Remains in ICU, intubated and sedated. ECMO. Hydrocortisone every 8 hours. BG below goal without insulin.   Eating:   NPO  Nausea: No  Hypoglycemia and intervention: No  Fever: No  TPN and/or TF: No    BP (!) 70/0 (BP Location: Right arm, Patient Position: Lying)   Pulse 63   Temp 98.9 °F (37.2 °C) (Oral)   Resp 10   Ht 5' 9" (1.753 m)   Wt 106 kg (233 lb 11 oz)   SpO2 100%   BMI 34.51 kg/m²      Labs Reviewed and Include    Recent Labs   Lab 03/31/19  0420 03/31/19  0759   *  114* 104  104   CALCIUM 9.1  9.1 10.4  10.4   ALBUMIN 3.0*  3.0* 2.9*  2.9*   PROT 5.0* 4.8*   *  135* 135*  135*   K 4.8  4.8 4.7  4.7  4.7   CO2 20*  20* 21*  21*     102 104  104   BUN 8  8 8  8   CREATININE 1.4  1.4 1.4  1.4   ALKPHOS 111 119   ALT 5,115*  --    AST 13,541*  --    BILITOT 4.4* 4.7*     Lab Results   Component Value Date    WBC 13.02 (H) 03/31/2019    HGB 10.0 (L) 03/31/2019    HCT 25 (L) 03/31/2019    MCV 86 03/31/2019    PLT 52 (L) 03/31/2019     No results for input(s): TSH, FREET4 in the last " 168 hours.  Lab Results   Component Value Date    HGBA1C 5.3 03/29/2019       Nutritional status:   Body mass index is 34.51 kg/m².  Lab Results   Component Value Date    ALBUMIN 2.9 (L) 03/31/2019    ALBUMIN 2.9 (L) 03/31/2019    ALBUMIN 3.0 (L) 03/31/2019    ALBUMIN 3.0 (L) 03/31/2019     No results found for: PREALBUMIN    Estimated Creatinine Clearance: 81.6 mL/min (based on SCr of 1.4 mg/dL).    Accu-Checks  Recent Labs     03/30/19  1533 03/30/19  1724 03/30/19  1803 03/30/19  1912 03/30/19 2012 03/30/19  2211 03/30/19  2357 03/31/19  0357 03/31/19  0608 03/31/19  0759   POCTGLUCOSE 94 100 102 101 105 116* 94 124* 114* 107       Current Medications and/or Treatments Impacting Glycemic Control  Immunotherapy:    Immunosuppressants     None        Steroids:   Hormones (From admission, onward)    Start     Stop Route Frequency Ordered    03/29/19 2330  hydrocortisone sodium succinate injection 100 mg      -- IV Every 8 hours 03/29/19 2319 03/29/19 2015  vasopressin (PITRESSIN) 0.2 Units/mL in dextrose 5 % 100 mL infusion      -- IV Continuous 03/29/19 2005 03/29/19 1148  vasopressin (PITRESSIN) 20 unit/mL injection     Note to Pharmacy:  Created by cabinet override    03/29 2359 03/29/19 1148        Pressors:    Autonomic Drugs (From admission, onward)    Start     Stop Route Frequency Ordered    03/29/19 2045  EPINEPHrine (ADRENALIN) 10 mg in sodium chloride 0.9% 250 mL infusion     Question Answer Comment   Titrate by: (in mcg/kg/min) 0.05    Titrate interval: (in minutes) 5    Titrate to maintain: (SBP or MAP or Cardiac Index) SBP    Maximum dose: (in mcg/kg/min) 2        -- IV Continuous 03/29/19 2044 03/29/19 2045  norepinephrine 16 mg in dextrose 5 % 250 mL infusion     Question Answer Comment   Titrate by: (in mcg/kg/min) 0.05    Titrate interval: (in minutes) 5    Titrate to maintain: (MAP or SBP) MAP    Greater than: (in mmHg) 65    Maximum dose: (in mcg/kg/min) 3        -- IV Continuous  03/29/19 2044 03/29/19 2040  cisatracurium (NIMBEX) 200 mg in dextrose 5 % 100 mL infusion     Question Answer Comment   Bolus over 1 minute (in mg): 0    Begin at (in mcg/kg/min): 2    Titrate by: (in mcg/kg/min) 1    Titrate interval: (in minutes) 5    To maintain a train-of-four of (TOF_/4): 0/4    Maximum dose: (in mcg/kg/min) 15        -- IV Continuous 03/29/19 2040 03/29/19 1938  norepinephrine 1 mg/mL injection     Note to Pharmacy:  Created by Achaogenide    03/30 0744   03/29/19 1938    03/29/19 1901  norepinephrine 1 mg/mL injection     Note to Pharmacy:  Created by LEHR override    03/30 0714   03/29/19 1901    03/29/19 1655  norepinephrine 1 mg/mL injection     Note to Pharmacy:  Created by LEHR override    03/30 0459   03/29/19 1655    03/29/19 1148  norepinephrine 1 mg/mL injection     Note to Pharmacy:  Created by LEHR override    03/29 2359   03/29/19 1148    03/29/19 0558  EPINEPHrine (ADRENALIN) 1 mg/mL injection     Note to Pharmacy:  Created by Achaogenide    03/29 1814   03/29/19 0558        Hyperglycemia/Diabetes Medications:   Antihyperglycemics (From admission, onward)    Start     Stop Route Frequency Ordered    03/29/19 2015  insulin regular (Humulin R) 100 Units in sodium chloride 0.9% 100 mL infusion     Question:  Insulin rate changes (DO NOT MODIFY ANSWER)  Answer:  [email or web address]    -- IV Continuous 03/29/19 2005          ASSESSMENT and PLAN    * Cardiogenic shock  Managed per primary.   Optimize BG control; avoid hypoglycemia        Acute hyperglycemia  BG goal 140-180    Discontinue IV insulin.   BG monitoring every 4 hours and low dose correction scale.       ST elevation myocardial infarction (STEMI)  Managed per primary.   S/p PCI        JACOBY (acute kidney injury)  Avoid insulin stacking and hypoglycemia.  Estimated Creatinine Clearance: 51.4 mL/min (A) (based on SCr of 2.2 mg/dL (H)).  CRRT per  nephrology.       Lower limb ischemia  Emergent left AKA  Optimize BG for wound healing.             Latha Little NP  Endocrinology  [facility]

## 2019-03-31 NOTE — NURSING
Patient returned to SICU from OR at 1545 per anesthesia post AKA and impella removal. Dr. London at bedside.Pt with little to no  pulsatility upon arrival to unit. VA ECMO speed 4200RPM with flows of 4-4.3. Patient is RIJ and Right Femoral cannulated.  Epi, levo, vaso, propofol,nimbex, heparin, bicarb gtts. Dialysis nurse at bedside CRRT reconnected. Patient currently paced VVI through the left fem TVP at 60bpm. TVP remains at 65cm. Pressors titrated to sustain MAP >60.     Heparin gtt increased to 600units/h to attain pTT goal of 50-70, bicarb gtt off, bicarb bath increased to 40 per nephrology. Ca bath increased to 3 per nephrology. 7g of calcium given in OR, 2g of calcium given upon arrival back to unit.     Nimbex gtt off, propofol titrated down. 750 Albumin given post arrival from OR.     Only venous flow is able to be dopplered at this time. Speed increased to 4400RPM per Dr. London.     Labs monitored every hour, CPK trending down after AKA operation.     At 1900, patient having increased pressor requirements, while giving Dr. London updates, patient went into VTach, 200mg of lidocaine administered, 1g of Mg administered. Pt converted back to HR of 70.     Family at bedside throughout day and spoken with by Dr. London and Dr. Arias about procedure and condition of patient. The family came to bedside upon patients arrival back from the OR and are coping as well as can be expected. Current POC and course discussed with wife and family, questions and concerns encouraged and addressed.  with family. Will continue to monitor.

## 2019-03-31 NOTE — PROGRESS NOTES
Patient currently on SLED for removal of uremic toxins and volume control.   I personally saw and examined the patient on SLED.  The patient is tolerating the treatment, see flow sheet for vitals and assessemts. I also reviewed the chart and current medication. The dialysis bath was adjusted. Patient remains on ECMO.     Volume management: Patient grossly positive over night (+3.8 liters) because of issues with hypotensive episodes over night. Remains on 3 pressors.   - will attempt to control the volume with increased UF as much as tolerated. Goal is to at least keep him even    (-450 as tolerated)  - electrolytes: He will remain on a 3 Ca bath - received multiple Ca iv boluses throughout the night. Never was hypocalcemic agree with boluses but would keep him in normal range.

## 2019-03-31 NOTE — PROGRESS NOTES
[facility]  Heart Transplant  Progress Note    Patient Name: Subhash Ac  MRN: [id number]  Admission Date: 3/29/2019  Hospital Length of Stay: 2 days  Attending Physician: Berlin Calderon MD  Primary Care Provider: Primary Doctor No  Principal Problem:Cardiogenic shock    Subjective:     Interval History:   Emergently taken to LLE amputation for ALI, since improved HD ecmo flow ~5L on speed of 4300. Sedation turned off with minimal responsive.     Continuous Infusions:   sodium chloride 0.9% 200 mL/hr at 03/31/19 1139    calcium gluconate IVPB      cisatracurium (NIMBEX) infusion 1 mcg/kg/min (03/30/19 1129)    dexmedetomidine (PRECEDEX) infusion Stopped (03/29/19 2200)    dextrose 5 % and 0.9 % NaCl with KCl 20 mEq 5 mL/hr (03/29/19 2119)    DOBUTamine Stopped (03/30/19 0000)    epinephrine infusion 0.06 mcg/kg/min (03/31/19 1300)    heparin (porcine) in 5 % dex 1,700 Units/hr (03/31/19 1300)    niCARdipine Stopped (03/29/19 2015)    nitric oxide gas      norepinephrine bitartrate-D5W 0.08 mcg/kg/min (03/31/19 1300)    propofol 35 mcg/kg/min (03/31/19 1300)    vasopressin (PITRESSIN) infusion 0.05 Units/min (03/31/19 1300)     Scheduled Meds:   aspirin  81 mg Oral Daily    calcium gluconate IVPB  2,000 mg Intravenous Once    ceFAZolin (ANCEF) IVPB  2 g Intravenous Q12H    clopidogrel  75 mg Oral Daily    fluconazole (DIFLUCAN) IVPB  400 mg Intravenous Q24H    hydrocortisone sodium succinate  100 mg Intravenous Q8H    insulin aspart U-100  0-5 Units Subcutaneous Q4H    mupirocin  1 g Nasal BID    pantoprazole  40 mg Intravenous BID    polyethylene glycol  17 g Oral Daily    sodium bicarbonate  50 mEq Intravenous Once    vancomycin (VANCOCIN) IVPB (custom)  1,500 mg Intravenous Q24H     PRN Meds:sodium chloride, sodium chloride, sodium chloride, sodium chloride, sodium chloride, sodium chloride, sodium chloride, sodium chloride, sodium chloride, sodium chloride,  acetaminophen, acetaminophen, bisacodyl, dextrose 50%, glucagon (human recombinant), magnesium sulfate IVPB, ondansetron, ondansetron, oxyCODONE, oxyCODONE, potassium chloride in water **AND** potassium chloride in water **AND** potassium chloride in water, potassium chloride in water **AND** potassium chloride in water **AND** potassium chloride in water    Review of patient's allergies indicates:  No Known Allergies  Objective:     Vital Signs (Most Recent):  Temp: 98.4 °F (36.9 °C) (03/31/19 1100)  Pulse: 71 (03/31/19 1430)  Resp: 10 (03/31/19 1430)  BP: (!) 70/0 (03/30/19 0700)  SpO2: 100 % (03/31/19 1100) Vital Signs (24h Range):  Temp:  [98.2 °F (36.8 °C)-98.9 °F (37.2 °C)] 98.4 °F (36.9 °C)  Pulse:  [] 71  Resp:  [0-36] 10  SpO2:  [74 %-100 %] 100 %  Arterial Line BP: ()/() 71/67     Patient Vitals for the past 72 hrs (Last 3 readings):   Weight   03/31/19 0500 106 kg (233 lb 11 oz)   03/29/19 1000 103.9 kg (229 lb)   03/29/19 0612 104.3 kg (229 lb 15 oz)     Body mass index is 34.51 kg/m².      Intake/Output Summary (Last 24 hours) at 3/31/2019 1500  Last data filed at 3/31/2019 1400  Gross per 24 hour   Intake 8898 ml   Output 7413 ml   Net 1485 ml       Hemodynamic Parameters:      Physical Exam   Constitutional:   Cool to touch. Intubated and paralyzed   HENT:   Head: Normocephalic and atraumatic.   Cardiovascular:   Impella in place. Patient connected to ECMO circuit    Pulmonary/Chest: No respiratory distress.   Intubated, PEEP minimal vent settings now that on ECMO   Abdominal: He exhibits no distension.   Genitourinary:   Genitourinary Comments: Trujillo in place   Musculoskeletal:   Paralyzed.   LLE cool to touch. Anterior shin modeled    Skin:   Skin is cool to touch   Psychiatric:   sedated       Significant Labs:  CBC:  Recent Labs   Lab 03/31/19  0420  03/31/19  0759  03/31/19  1109 03/31/19  1213 03/31/19  1311   WBC 13.23*  --  13.02*  --   --  14.15*  --    RBC 3.29*  --  3.32*   --   --  3.47*  --    HGB 9.8*  --  10.0*  --   --  10.2*  --    HCT 28.9*   < > 28.6*   < > 26* 29.9* 27*   PLT 47*  --  52*  --   --  48*  --    MCV 88  --  86  --   --  86  --    MCH 29.8  --  30.1  --   --  29.4  --    MCHC 33.9  --  35.0  --   --  34.1  --     < > = values in this interval not displayed.     BNP:  Recent Labs   Lab 03/29/19  0052   *     CMP:  Recent Labs   Lab 03/31/19  0420 03/31/19  0759 03/31/19  1214   *  114* 104  104 96  96   CALCIUM 9.1  9.1 10.4  10.4 10.1  10.1   ALBUMIN 3.0*  3.0* 2.9*  2.9* 3.3*  3.3*   PROT 5.0* 4.8* 5.1*   *  135* 135*  135* 137  137   K 4.8  4.8 4.7  4.7  4.7 4.6  4.6  4.6  4.6   CO2 20*  20* 21*  21* 19*  19*     102 104  104 104  104   BUN 8  8 8  8 8  8   CREATININE 1.4  1.4 1.4  1.4 1.3  1.3   ALKPHOS 111 119 138*   ALT 5,115* 4,390* 3,895*   AST 13,541* 13,075* 12,197*   BILITOT 4.4* 4.7* 5.5*      Coagulation:   Recent Labs   Lab 03/31/19  0420 03/31/19  0759 03/31/19  1014 03/31/19  1214   INR 3.3* 3.3*  --  3.1*  3.1*   APTT 48.7* 47.2* 47.9*  --      LDH:  Recent Labs   Lab 03/29/19  0804 03/31/19  1300   LDH 3,101* 13,050*     Microbiology:  Microbiology Results (last 7 days)     Procedure Component Value Units Date/Time    Blood culture [[id number]]     Order Status:  Canceled Specimen:  Blood     Blood culture [[id number]]     Order Status:  Canceled Specimen:  Blood           I have reviewed all pertinent labs within the past 24 hours.    Estimated Creatinine Clearance: 87.9 mL/min (based on SCr of 1.3 mg/dL).    Diagnostic Results:  I have reviewed all pertinent imaging results/findings within the past 24 hours.    Assessment and Plan:        Multi-organ failure with heart failure  Mr. Doe is a 43yoM admitted w STEMI underwent PCI to LM/LAD and developed cardiogenic shock, briefly on IABP, followed by Impella CP and high doses of epinephrine. Eventually progressing to multiple organ failure  requiring Tandem Heart VAD with VA-ECMO on 03/29//19. Further complicated by developing ALI of the LLE s/p urgent Left AKA with CTS on 03/30/19. Since amputation improvement of his hemodynamic status with adequate ECMO flows ~ 5L and speed 4300. Clearing Lactic acid, LFTs and CPK down trending. Remained intubation, off sedation in the morning to evaluate for neurological recovery.       - CTS primary Dr. Worrell / L[place]   - HTS will follow along, please call for any questions or concerns  - Wean off vasopressor support as tolerated   - Recommend continued optimization from his Cardiogenic shock standpoint  - Agree with holding sedation to evaluation neurological status  - Monitor Lactic, CMP, and CPK until normalizes   - Wean off Vent support as tolerated     Discussed with family at bedside.                      Marilyn Garcia MD  Heart Transplant  [facility]

## 2019-03-31 NOTE — ANESTHESIA POSTPROCEDURE EVALUATION
"Anesthesia Post Evaluation    Patient: Subhash Ac    Patient: Subhash Ac    Procedure(s) Performed: Procedure(s) (LRB):  Left heart cath (Left)  Percutaneous coronary intervention (N/A)  Placement, IABP  INSERTION, IMPELLA (N/A)  Removal, IABP    Patient location: ICU    Anesthesia Type: general    Transport from OR: Transported from OR intubated on 100% O2 by AMBU with assisted ventilation. Upon arrival to PACU/ICU, patient attached to ventilator and auscultated to confirm bilateral breath sounds and adequate TV. Continuous ECG monitoring in transport. Continuous SpO2 monitoring in transport. Continuos invasive BP monitoring in transport    Post pain: adequate analgesia    Post assessment: no apparent anesthetic complications    Vital signs course: patient hypotensive throughout - uptitrated norepinephrine to achieve MAP of 65 - goal met upon departure from ICU bedside.    Level of consciousness: sedated    Nausea/Vomiting: no nausea/vomiting    Complications: none    Transfer of care protocol was followed      Last vitals:   Visit Vitals  BP (!) 75/51 (BP Location: Left arm, Patient Position: Lying)   Pulse 62   Temp 36.1 °C (97 °F) (Oral)   Resp 14   Ht 5' 9" (1.753 m)   Wt 104.3 kg (230 lb)   SpO2 97%   BMI 33.97 kg/m²       "

## 2019-04-01 PROBLEM — I47.20 SUSTAINED VT (VENTRICULAR TACHYCARDIA): Status: ACTIVE | Noted: 2019-01-01

## 2019-04-01 PROBLEM — I63.531 ACUTE ISCHEMIC RIGHT PCA STROKE: Status: ACTIVE | Noted: 2019-01-01
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
## 2019-04-01 NOTE — ASSESSMENT & PLAN NOTE
-in the setting of transmural infarct with likely cause from fixed cardiac scar tissue from STEMI  -c/w lidocaine drip at 4 mg/min  -c/w amiodarone drip at 1 mg/min for 6 hours followed by 0.5 mg/min   -c/w current pacer settings  -monitor electrolytes and replete as needed, goal potassium >4 and magnesium >2  -keep defibrillator pads on patient  -c/w to monitor on telemetry   -EP team will continue to follow along

## 2019-04-01 NOTE — SUBJECTIVE & OBJECTIVE
Interval History/Significant Events:   POD2 s/p L AKA with CTS; vtach this AM. Continuing on ecmo, crrt and Remains on following gtts    sodium chloride 0.9% 200 mL/hr at 03/31/19 1139    amiodarone in dextrose 5% 1 mg/min (04/01/19 0705)    calcium gluconate IVPB      cisatracurium (NIMBEX) infusion 1 mcg/kg/min (03/30/19 1129)    dexmedetomidine (PRECEDEX) infusion Stopped (03/29/19 2200)    dextrose 5 % and 0.9 % NaCl with KCl 20 mEq 5 mL/hr (03/29/19 2119)    DOBUTamine Stopped (03/30/19 0000)    epinephrine infusion 0.06 mcg/kg/min (04/01/19 0600)    heparin (porcine) in 5 % dex 1,800 Units/hr (04/01/19 0600)    lidocaine 1 mg/min (04/01/19 0600)    niCARdipine Stopped (03/29/19 2015)    norepinephrine bitartrate-D5W 0.07 mcg/kg/min (04/01/19 0600)    propofol 25 mcg/kg/min (04/01/19 0600)    vasopressin (PITRESSIN) infusion 0.05 Units/min (04/01/19 0600)         Follow-up For: Procedure(s) (LRB):  LEFT ABOVE KNEE AMPUTATION REMOVAL OF IMPELLA 5.0 (Left)  FASCIOTOMY 3 COMPARTMENT (Left)    Post-Operative Day: 2 Days Post-Op    Objective:     Vital Signs (Most Recent):  Temp: 98.7 °F (37.1 °C) (04/01/19 0300)  Pulse: 86 (04/01/19 0700)  Resp: 10 (04/01/19 0700)  BP: (!) 78/71 (03/31/19 1500)  SpO2: 100 % (04/01/19 0700) Vital Signs (24h Range):  Temp:  [98.2 °F (36.8 °C)-98.7 °F (37.1 °C)] 98.7 °F (37.1 °C)  Pulse:  [] 86  Resp:  [0-36] 10  SpO2:  [99 %-100 %] 100 %  BP: (76-79)/(71-74) 78/71  Arterial Line BP: ()/() 63/59     Weight: 106 kg (233 lb 11 oz)  Body mass index is 34.51 kg/m².      Intake/Output Summary (Last 24 hours) at 4/1/2019 0707  Last data filed at 4/1/2019 0600  Gross per 24 hour   Intake 7751 ml   Output 9905 ml   Net -2154 ml       Physical Exam   Constitutional:   Intubated.   Off sedation- no appreciated neuro exam   HENT:   Head: Normocephalic and atraumatic.   Cardiovascular:   RRR. Patient connected to ECMO circuit    Pulmonary/Chest: No respiratory  distress.   Intubated, PEEP minimal vent settings now that on ECMO   Abdominal: He exhibits no distension.   Genitourinary:   Genitourinary Comments: [place] in place   Musculoskeletal:      L AKA stump intact. Prevena wound vac in place    Skin:   Skin is cool to touch          Vents:  Vent Mode: A/C (04/01/19 0508)  Ventilator Initiated: Yes (03/29/19 0333)  Set Rate: 10 bmp (04/01/19 0508)  Vt Set: 300 mL (04/01/19 0508)  Pressure Support: 0 cmH20 (03/29/19 1341)  PEEP/CPAP: 8 cmH20 (04/01/19 0508)  Oxygen Concentration (%): 40 (04/01/19 0700)  Peak Airway Pressure: 46 cmH2O (04/01/19 0508)  Plateau Pressure: 27 cmH20 (04/01/19 0508)  Total Ve: 2.63 mL (04/01/19 0508)  F/VT Ratio<105 (RSBI): (!) 18.87 (04/01/19 0508)    Lines/Drains/Airways     Central Venous Catheter Line                 Introducer 03/29/19 2015 left femoral vein 2 days         Percutaneous Central Line Insertion/Assessment - triple lumen  03/29/19 2015 left internal jugular 2 days         Trialysis (Dialysis) Catheter 03/29/19 2015 left internal jugular 2 days          Drain                 NG/OG Tube 03/29/19 0300 Center mouth 3 days          Airway                 Airway - Non-Surgical 03/29/19 0223 Endotracheal Tube 3 days          Arterial Line                 Arterial Line 03/29/19 2015 Right Radial 2 days          Line                 Pacer Wires 03/29/19 2015 2 days         VAD 03/30/19 0000 Right ventricular assist device 2 days          Peripheral Intravenous Line                 Peripheral IV - Single Lumen 03/29/19 0039 Left Antecubital 3 days                Significant Labs:    CBC/Anemia Profile:  Recent Labs   Lab 03/31/19 2001 03/31/19  2358  04/01/19  0312 04/01/19  0319 04/01/19  0404   WBC 15.12*  --  14.03*  --   --  13.73*  --    HGB 10.3*  --  9.8*  --   --  9.8*  --    HCT 29.8*   < > 28.4*   < > 26* 27.7* 25*   PLT 75*  --  59*  --   --  61*  --    MCV 88  --  87  --   --  87  --    RDW 15.4*  --  15.4*  --   --  15.4*   --     < > = values in this interval not displayed.        Chemistries:  Recent Labs   Lab 03/31/19 2002 03/31/19  2203 03/31/19  2358 04/01/19  0319     139 140 141  141 140  140   K 4.7  4.7  4.7  4.7 4.7 4.5  4.5  4.5  4.5 4.1  4.1  4.1     103 104 103  103 100  100   CO2 22*  22* 22* 23  23 24  24   BUN 8  8 8 8  8 8  8   CREATININE 1.3  1.3 1.3 1.3  1.3 1.3  1.3   CALCIUM 9.1  9.1 9.1 9.3  9.3 9.8  9.8   ALBUMIN 3.2*  3.2* 3.3* 3.6  3.6 3.4*  3.4*   PROT 4.6*  --  5.8* 5.9*   BILITOT 6.3*  --  6.5* 6.9*   ALKPHOS 151*  --  162* 167*   ALT 3,075*  --  2,490* 2,328*   AST 9,973*  --  8,019* 7,086*   MG 2.3 2.5 2.5 2.5  2.5   PHOS 3.4  3.4 2.9 2.7  2.7 2.8  2.8       ABGs:   Recent Labs   Lab 04/01/19  0404   PH 7.455*   PCO2 40.5   HCO3 28.5*   POCSATURATED 100   BE 5       Significant Imaging:  I have reviewed all pertinent imaging results/findings within the past 24 hours.

## 2019-04-01 NOTE — TELEPHONE ENCOUNTER
The patient will not quailfy for Pharmacy Patient Assistance due to having Medicaid.  If medicaid is not paying for the prescription a P/A might need to be done or the doctor will have to prescribe something on his formulary.   Patient was given a coupon for one month supply of Brilinta

## 2019-04-01 NOTE — ASSESSMENT & PLAN NOTE
Neuro:   - Intubated   -Sedation off to access neuro exam. No response at this time  - Head CT today to assess for CVA  - Off nimbex   - no pain medications     Pulmonary:   - Intubated  Vent Mode: A/C  Oxygen Concentration (%):  [39-41] 40  Resp Rate Total:  [10 br/min-42 br/min] 10 br/min  Vt Set:  [300 mL] 300 mL  PEEP/CPAP:  [5 cmH20-8 cmH20] 8 cmH20  Mean Airway Pressure:  [8.3 ueW62-75 cmH20] 11 cmH20  - ECMO running- started overnight 3/29  - Daily CXRs while intubated  - ABGs PRN  - BTs, DuoNebs     Cardiac:  - Impella removed in OR 3/30  - Epi, Norepi, Vaso, Lido gtts  - Jem  - Monitor chest tube output  - Anticoagulation: Heparin @ 1800 this morning, PTT goal 40-70      -Vtach this morning requiring Cardioversion x 2, converted back to NSR     Renal:    - Anuric on CRRT   - Trend BUN/Cr  - Nephrology following      ID:   - Afebrile   -Ischemic LLE- s/p L AKA on 3/30   - Leukocytosis decreasing   -lactate steady  - Trend WBC daily  - Ancef, diflucan, vanc     Hem/Onc:   - H/H stable  - 1uPRBC this morning. 4uPRBC intraoperatively.  - heparin gtt with PTT goal 50-70     Endocrine:    - Insulin gtt for glucose control  - Endocrine consulted, appreciate assistance     Fluids/Electrolytes/Nutrition/GI:   - NPO  - Replace electrolytes PRN  - bicarb bath with crrt   -replaced calcium  -Patient with shock liver- with AST and ALT elevated Will continued to monitor     PPx:  - DVT: heparin gtt, SCDs     DISPO:  - Continue SICU care. Poor prognosis. Will obtain head CT today and continue discussion with family

## 2019-04-01 NOTE — ASSESSMENT & PLAN NOTE
Mr. Doe is a 43yoM admitted w STEMI underwent PCI to LM/LAD and developed cardiogenic shock, briefly on IABP, followed by Impella CP and high doses of epinephrine. Eventually progressing to multiple organ failure requiring Tandem Heart VAD with VA-ECMO on 03/29//19. Further complicated by developing ALI of the LLE s/p urgent Left AKA with CTS on 03/30/19. Since amputation improvement of his hemodynamic status with adequate ECMO flows ~ 5L and speed 4300. Clearing Lactic acid, LFTs and CPK down trending. Remained intubation, off sedation in the morning to evaluate for neurological recovery.   - CTS primary Dr. Worrell / Lita   - HTS will follow along, please call for any questions or concerns  - Wean off vasopressor support as tolerated   - Recommend continued optimization from his Cardiogenic shock standpoint  - Agree with holding sedation to evaluation neurological status  - Monitor Lactic, CMP, and CPK until normalizes   - Wean off Vent support as tolerated

## 2019-04-01 NOTE — ASSESSMENT & PLAN NOTE
43 year old man admitted with STEMI and cardiogenic shock who has had a complicated course. Ischemic stroke found on imaging as mental status not improving after holding propofol. Etiology is likely cardioembolic. This is a critically ill patient, however this stroke is not contributing to his current mental status and prognosis would be good from a stroke perspective. His poor mentation may be related to lingering effects of propofol in the setting of multiorgan failure.    Antithrombotics for secondary stroke prevention:  On heparin infusion for ECMO; on aspirin and plavix for CAD/post PCI  Statins for secondary stroke prevention and hyperlipidemia, if present:   Statins: Atorvastatin- 40 mg daily  Aggressive risk factor modification: Obesity, CAD  Rehab efforts: Occupational Therapy, PT/OT/SLP to evaluate and treat, PM&R consult , if and when appropriate per primary team  Diagnostics ordered/pending: None - will defer any vascular imaging for now due to critical illness, and results would not   VTE prophylaxis: On heparin infusion  BP parameters: Infarct: No intervention, SBP <220

## 2019-04-01 NOTE — PROGRESS NOTES
Pt. Remains on VA Ecmo. Pt. Cannulated in the RFA with a 17 fr. Arterial cannula and RIJ with a 31 fr. Venous cannula. Pt. Is currently on an RPM of 4500 and 4.9 lpm of flow. Current sweep is 7.0 lpm and fio2 100%. Adjusted sweep according to ABG. Pt. Was transported to CT scan today without complication and cannula placement was confirmed after back in the SICU.

## 2019-04-01 NOTE — PROGRESS NOTES
EP Note     Notified by CTS fellow that pt having sustained monomorphic VT. Pt seen and assessed. EP fellow, Dr. Cali notified as well. Pt was bolused twice with amiodarone (150 mg) and started on continuous infusion. Lidocaine boluses given twice as well and drip up-titrated from 1 mg/min to 2 mg/min and eventually 4 mg/min. Sedation with propofol infusion increased. Given pt's LV thrombus, primary team apprehensive about cardioverting initially. However given pt's persistent MMVT it was discussed with Dr. London via EP attending, Dr. Keenan to proceed with synchronized cardioversion with delivery of 200 J x2. Each time patient went into sinus rhythm however very briefly and reverted to MMVT at slower rate 145 bpm. So pt's TVP settings were adjusted to over pace at 160 bpm and threshold adjusted (2 mA to 4 mA) to correct for over-sensing. Pt currently in NSR at 70 bpm. Etiology of MMVT likely from myocardial scar from patient's STEMI. Full EP consult note to follow.     Cami Delaney, PGY-4 (Cardiology Fellow)

## 2019-04-01 NOTE — ASSESSMENT & PLAN NOTE
43 y.o. male s/p STEMI and taken for [facility] w PCI to LM-LAD, IABP placement and immediate removal followed by placement of Impella CP. Patient then placed on Tandem heart VAD with ECMO on 3/29.  Patient taken back to OR for left AKA 3/30 with removal of impella    Neuro:   - Intubated and sedated on Propofol gtt  - Off of Nimbex previously, restarted   - BIS monitor with depressed activity off sedation      Pulmonary:   - Intubated  - ECMO running  - Daily CXRs while intubated  - ABGs PRN  - BTs, DuoNebs    Cardiac:   - 3/28-3/29: code STEMI and taken for [facility] w PCI to LM-LAD, IABP placement and immediate removal followed by placement of Impella CP. Continued heart failure then led to placement of Tandem heart VAD with ECMO.  Impella removed.  - Epi, Norepi, Vaso gtt's  - NO at 10ppm  - Wean pressors per CTS  - Monitor chest tube output  - Anticoagulation per CTS    Renal:    - Anuric on CRRT   - Trend BUN/Cr  - Nephrology on board    ID:   - Tmax 98.7  - Leukocytosis 26 postop, continue to trenddown currently 13  - Trend WBC daily  - Ancef    Hem/Onc:   - H/H stable  - Transfuse per CTS  - heparin gtt    Endocrine:    - Insulin gtt for glucose control  - Endocrine consulted, appreciate assistance    Fluids/Electrolytes/Nutrition/GI:   - Replace electrolytes PRN  - mIVF per CTS  - Currently NPO, diet per CTS  - LFTs indicative of shock liver, started on NAC    PPx:  - DVT: heparin gtt, SCDs  - PUP:  Protonix    DISPO:  - Continue SICU care  - Goals of care discussion per primary     Primary: CTS
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
## 2019-04-01 NOTE — ASSESSMENT & PLAN NOTE
Anuric JACOBY 2/2 Ischemic ATN from Cardiogenic Shock    42 yo male presenting to hospital with chief complaint of SOB/CP found to have STEMI.  Taken urgently to cathlab, found to have 100% occlusion to LM, now s/p PCI with RAQUEL.  Impella was placed for cardiogenic shock, noted to be in biventricular failure.  CTS/IC evaluation for possible Tandem placement.    Plan/Recommendations:  - Will continue with SLED for clearance and volume assistance, -450 cc/hr as tolerated by patient maintain MAP> 65. Bath adjusted to chem.   - Continues on mechanical ventilation with FiO2 40%  - Has been NET negative 2.1 L yesterday   - Continues on ECMO  - Will follow closely  - Strict I/Os and chart

## 2019-04-01 NOTE — CARE UPDATE
BG goal 140-180.       Change BG monitoring every 6 hours and low dose correction scale.   No history of DM. BG remain below goal without insulin. Will sign off.     Call with any BG/ endocrine related questions or concerns or changes in nutrition (TPN, TF) which may elevate BG.

## 2019-04-01 NOTE — PROGRESS NOTES
[facility]  Nephrology  Progress Note    Patient Name: Subhash Ac  MRN: [id number]  Admission Date: 3/29/2019  Hospital Length of Stay: 3 days  Attending Provider: Berlin Calderon MD   Primary Care Physician: Primary Doctor No  Principal Problem:Cardiogenic shock    Subjective:     HPI: Mr. Subhash Ac is a 42 yo male who was admitted ton 3/29 with chief complaint of CP and SOB, found to have STEMI.  He was urgently taken to the cath lab and found to have 100% thrombotic occlusion to the LM, now s/p PCI with RAQUEL with dilation.  An Impella was placed and patient was transferred to ICU for continued management of his cardiogenic shock.  Pressor requirements have continued to increase with rise in lactic acid overnight with continued decline of kidney function to the point of anuria.  Bicarb trending down to 12, ABG with acidemia with pH of 7.1.  Vent settings were adjusted and he was started on a bicarbonate gtt with improvement in his pH to 7.2.  He continues to require Epi/Levo/Vaso for BP support, yet remains hypotensive.  CTS and interventional cardiology both consulted for further help with perfusion, currently being evaluated for Tandem placement either via CT surgery vs. Cath lab.  Nephrology has been consulted for JACOBY in setting of cardiogenic shock.    Interval History:   Patient evaluated at bedside, continues on mechanical ventilation FiO2 40%, On SLED for clearance and volume removal. On ECMO. NET negative 2.7 L yesterday.     Review of patient's allergies indicates:  No Known Allergies  Current Facility-Administered Medications   Medication Frequency    0.9%  NaCl infusion (CRRT USE ONLY) Continuous    0.9%  NaCl infusion (for blood administration) Q24H PRN    acetaminophen tablet 650 mg Q4H PRN    acetaminophen tablet 650 mg Q6H PRN    amiodarone (CORDARONE) 50 mg/mL injection     amiodarone 360 mg/200 mL (1.8 mg/mL) infusion Continuous    amiodarone in dextrose 150  mg/100 mL (1.5 mg/mL) loading dose 150 mg Once    amiodarone in dextrose 150 mg/100 mL (1.5 mg/mL) loading dose 150 mg Once    aspirin chewable tablet 81 mg Daily    bisacodyl suppository 10 mg Q12H PRN    calcium gluconate 2 g in dextrose 5 % 100 mL IVPB Once    chlorhexidine 0.12 % solution 15 mL BID    clopidogrel tablet 75 mg Daily    dextrose 5 % and 0.9 % NaCl with KCl 20 mEq infusion Continuous    dextrose 50% injection 12.5 g PRN    EPINEPHrine (ADRENALIN) 10 mg in sodium chloride 0.9% 250 mL infusion Continuous    fluconazole (DIFLUCAN) IVPB 400 mg 200 mL Q24H    glucagon (human recombinant) injection 1 mg PRN    heparin 25,000 units in dextrose 5% 250 mL (100 units/mL) infusion (heparin infusion) Continuous    hydrocortisone sodium succinate injection 100 mg Q8H    insulin aspart U-100 pen 0-5 Units Q6H PRN    lidocaine 2000 mg in dextrose 5% 250 mL infusion Continuous    magnesium sulfate 2g in water 50mL IVPB (premix) PRN    magnesium sulfate 2g in water 50mL IVPB (premix) Once    mupirocin 2 % ointment 1 g BID    norepinephrine 16 mg in dextrose 5 % 250 mL infusion Continuous    ondansetron disintegrating tablet 8 mg Q8H PRN    ondansetron injection 4 mg Q12H PRN    oxyCODONE immediate release tablet 5 mg Q4H PRN    oxyCODONE immediate release tablet Tab 10 mg Q4H PRN    pantoprazole injection 40 mg BID    polyethylene glycol packet 17 g Daily    propofol (DIPRIVAN) 10 mg/mL infusion Continuous    [START ON 4/2/2019] vancomycin 1750 mg in 0.9% sodium chloride 500 mL IVPB Q24H    vasopressin (PITRESSIN) 0.2 Units/mL in dextrose 5 % 100 mL infusion Continuous       Objective:     Vital Signs (Most Recent):  Temp: 98.5 °F (36.9 °C) (04/01/19 0830)  Pulse: 72 (04/01/19 0900)  Resp: (!) 0 (04/01/19 0900)  BP: (!) 78/71 (03/31/19 1500)  SpO2: 100 % (04/01/19 0830)  O2 Device (Oxygen Therapy): ventilator (04/01/19 0857) Vital Signs (24h Range):  Temp:  [98.2 °F (36.8 °C)-98.7 °F  (37.1 °C)] 98.5 °F (36.9 °C)  Pulse:  [0-186] 72  Resp:  [0-36] 0  SpO2:  [99 %-100 %] 100 %  BP: (76-79)/(71-74) 78/71  Arterial Line BP: ()/() 77/73     Weight: 106 kg (233 lb 11 oz) (03/31/19 0500)  Body mass index is 34.51 kg/m².  Body surface area is 2.27 meters squared.    I/O last 3 completed shifts:  In: 12609 [I.V.:35570; Blood:733]  Out: 37410 [Other:54066; Blood:40]    Physical Exam   Constitutional: He appears ill. He is intubated.   HENT:   Head: Normocephalic and atraumatic.   Eyes: Right eye exhibits no discharge. Left eye exhibits no discharge.   Cardiovascular:   RRR. Patient connected to ECMO circuit    Pulmonary/Chest: He is intubated. No respiratory distress.   Abdominal: He exhibits no distension.   Genitourinary:   Genitourinary Comments: Trujillo in place   Musculoskeletal:   Left above knee amputation    Skin: Skin is warm.       Significant Labs:  ABGs:   Recent Labs   Lab 04/01/19  0804   PH 7.366   PCO2 48.1*   HCO3 27.5   POCSATURATED 74*   BE 2     BMP:   Recent Labs   Lab 04/01/19 0319   GLU 97  97     100   CO2 24  24   BUN 8  8   CREATININE 1.3  1.3   CALCIUM 9.8  9.8   MG 2.5  2.5     CBC:   Recent Labs   Lab 04/01/19  0721 04/01/19  0735   WBC 15.92*  --    RBC 3.24*  --    HGB 10.0*  --    HCT 28.6* 26*   PLT 60*  --    MCV 88  --    MCH 30.9  --    MCHC 35.0  --      CMP:   Recent Labs   Lab 04/01/19 0319   GLU 97  97   CALCIUM 9.8  9.8   ALBUMIN 3.4*  3.4*   PROT 5.9*     140   K 4.1  4.1  4.1   CO2 24  24     100   BUN 8  8   CREATININE 1.3  1.3   ALKPHOS 167*   ALT 2,328*   AST 7,086*   BILITOT 6.9*     All labs within the past 24 hours have been reviewed.     Assessment/Plan:     * Cardiogenic shock  - managed by primary team    JACOBY (acute kidney injury)  Anuric JACBOY 2/2 Ischemic ATN from Cardiogenic Shock    44 yo male presenting to hospital with chief complaint of SOB/CP found to have STEMI.  Taken urgently to cathlab, found to  have 100% occlusion to LM, now s/p PCI with RAQUEL.  Impella was placed for cardiogenic shock, noted to be in biventricular failure.  CTS/IC evaluation for possible Tandem placement.    Plan/Recommendations:  - Will continue with SLED for clearance and volume assistance, -450 cc/hr as tolerated by patient maintain MAP> 65. Bath adjusted to chem.   - Continues on mechanical ventilation with FiO2 40%  - Has been NET negative 2.1 L yesterday   - Continues on ECMO  - Will follow closely  - Strict I/Os and chart    ST elevation myocardial infarction (STEMI)  - Managed by primary team        Gordo Kerns  Nephrology  Fellow  [facility]    Pager [phone]

## 2019-04-01 NOTE — PLAN OF CARE
04/01/19 1536   Discharge Assessment   Assessment Type Discharge Planning Reassessment   Discharge Plan A Other  (TBD)   Patient/Family in Agreement with Plan unable to assess     The patient s/p % successful PCI w/IABP followed by Impella support. Now with tandem and ECHO. MODS. Medicaid pending. Dispo TBD.

## 2019-04-01 NOTE — PLAN OF CARE
Problem: Adult Inpatient Plan of Care  Goal: Plan of Care Review  Outcome: Ongoing (interventions implemented as appropriate)  Pt currently in NSR. HR: 58-75, MAP: 57-70. MAP goal 60-80. 1 unit PRBC given at 0830. CT of the head completed. CRRT UF @ 450, pt tolerating well. Pt remains on ventilator at 40% and 8 PEEP. Lung sounds are coarse bilaterally. Pupils are 2mm, equal, brisk. Pulses are obtained by doppler. Right hand is mottled, with greater than 3 secs capillary refill. Pt is not following commands, withdrawing to pain or moving any extremity.

## 2019-04-01 NOTE — PROGRESS NOTES
Sustained Vtach - pulseless - noted on monitor with rate in 200s, MAP currently stable 60's -70's.  Dr London notified, CTS resident notified, and charge nurse notified with resident and charge nurse to bedside. 100mg Lidocaine bolus ordered and administered, 150mg amiodarone bolus ordered and administered, Amiodarone maintenance infusion ordered. EP consulted and to come to bedside. Pads placed on patient and connected to code cart. Lidocaine gtt increased to 2mg/min. EP to bedside.

## 2019-04-01 NOTE — PT/OT/SLP PROGRESS
Physical Therapy not seen      Patient Name:  Subhash Ac   MRN:  [id number]    Patient not seen today secondary to (pt on hold 2nd to new orders needed since surgery. ). Will follow-up with new orders when medically stable. .    Elizabeth Cutler, PT

## 2019-04-01 NOTE — PROGRESS NOTES
[facility]  Heart Transplant  Progress Note    Patient Name: Subhash Ac  MRN: [id number]  Admission Date: 3/29/2019  Hospital Length of Stay: 3 days  Attending Physician: Berlin Calderon MD  Primary Care Provider: Primary Doctor No  Principal Problem:Cardiogenic shock    Subjective:     Interval History:   Had MMVT at night and received amiodarone and DCCV, followed by slow VT on 140 for which was paced at 160 then returned to sinus.  Transaminases are still on the 4K and 1K but trending down  CT head showed large stroke   Still intubated on VA ECMO    Continuous Infusions:   sodium chloride 0.9% 200 mL/hr at 04/01/19 1800    amiodarone in dextrose 5% 0.05 mg/min (04/01/19 1800)    dextrose 5 % and 0.9 % NaCl with KCl 20 mEq 5 mL/hr (03/29/19 2119)    epinephrine infusion 0.06 mcg/kg/min (04/01/19 1800)    heparin (porcine) in 5 % dex 1,400 Units/hr (04/01/19 1803)    lidocaine 4 mg/min (04/01/19 1800)    norepinephrine bitartrate-D5W 0.07 mcg/kg/min (04/01/19 1800)    propofol Stopped (04/01/19 0825)    vasopressin (PITRESSIN) infusion 0.05 Units/min (04/01/19 1800)     Scheduled Meds:   amiodarone in dextrose  150 mg Intravenous Once    aspirin  81 mg Oral Daily    calcium gluconate IVPB  2 g Intravenous Once    chlorhexidine  15 mL Mouth/Throat BID    clopidogrel  75 mg Oral Daily    fluconazole (DIFLUCAN) IVPB  400 mg Intravenous Q24H    hydrocortisone sodium succinate  100 mg Intravenous Q8H    mupirocin  1 g Nasal BID    pantoprazole  40 mg Intravenous BID    polyethylene glycol  17 g Oral Daily    [START ON 4/2/2019] vancomycin (VANCOCIN) IVPB  1,750 mg Intravenous Q24H     PRN Meds:sodium chloride, acetaminophen, acetaminophen, bisacodyl, dextrose 50%, glucagon (human recombinant), insulin aspart U-100, magnesium sulfate IVPB, ondansetron, ondansetron, oxyCODONE, oxyCODONE    Review of patient's allergies indicates:  No Known Allergies  Objective:     Vital Signs  (Most Recent):  Temp: 98 °F (36.7 °C) (04/01/19 1530)  Pulse: 60 (04/01/19 1815)  Resp: (!) 1 (04/01/19 1815)  BP: (!) 78/71 (03/31/19 1500)  SpO2: (!) 79 % (04/01/19 1445) Vital Signs (24h Range):  Temp:  [98 °F (36.7 °C)-98.7 °F (37.1 °C)] 98 °F (36.7 °C)  Pulse:  [0-186] 60  Resp:  [0-23] 1  SpO2:  [79 %-100 %] 79 %  Arterial Line BP: ()/(47-88) 63/63     Patient Vitals for the past 72 hrs (Last 3 readings):   Weight   03/31/19 0500 106 kg (233 lb 11 oz)     Body mass index is 34.51 kg/m².      Intake/Output Summary (Last 24 hours) at 4/1/2019 1830  Last data filed at 4/1/2019 1800  Gross per 24 hour   Intake 06464.17 ml   Output 9160 ml   Net 3748.17 ml       Physical Exam   Constitutional:   Cool to touch. Intubated and paralyzed   HENT:   Head: Normocephalic and atraumatic.   Cardiovascular:   Impella in place. Patient connected to ECMO circuit    Pulmonary/Chest: No respiratory distress.   Intubated, PEEP minimal vent settings now that on ECMO   Abdominal: He exhibits no distension.   Genitourinary:   Genitourinary Comments: Trujillo in place   Musculoskeletal:   Paralyzed.   LLE cool to touch. Anterior shin modeled    Skin:   Skin is cool to touch   Psychiatric:   sedated       Significant Labs:  CBC:  Recent Labs   Lab 04/01/19  0721  04/01/19  1156 04/01/19  1503 04/01/19  1558   WBC 15.92*  --  12.79* 13.12*  --    RBC 3.24*  --  3.22* 3.25*  --    HGB 10.0*  --  9.8* 10.0*  --    HCT 28.6*   < > 28.8* 28.8* 25*   PLT 60*  --  43* 60*  --    MCV 88  --  89 89  --    MCH 30.9  --  30.4 30.8  --    MCHC 35.0  --  34.0 34.7  --     < > = values in this interval not displayed.     BNP:  Recent Labs   Lab 03/29/19  0052   *     CMP:  Recent Labs   Lab 04/01/19  0721 04/01/19  1012 04/01/19  1503   GLU 93 98 112*   CALCIUM 9.8 10.2 10.5   ALBUMIN 3.3* 3.1* 3.1*   PROT 4.7* 4.5* 5.0*    138 138   K 4.0 4.1 3.9  3.9   CO2 21* 19* 19*    105 105   BUN 8 8 13   CREATININE 1.2 1.1 1.6*    ALKPHOS 159* 152* 164*   ALT 2,114* 1,934* 1,770*   AST 5,947* 5,100* 4,237*   BILITOT 7.3* 7.3* 7.9*      Coagulation:   Recent Labs   Lab 04/01/19  0319 04/01/19  0757 04/01/19  0806 04/01/19  1503 04/01/19  1637   INR 2.3* 2.1*  --  2.2*  --    APTT 78.2*  --  84.7* 78.9* 78.9*     LDH:  Recent Labs   Lab 03/31/19  1300 04/01/19  0319   LDH 13,050* 7,792*     Microbiology:  Microbiology Results (last 7 days)     Procedure Component Value Units Date/Time    Blood culture [[id number]]     Order Status:  Canceled Specimen:  Blood     Blood culture [[id number]]     Order Status:  Canceled Specimen:  Blood           I have reviewed all pertinent labs within the past 24 hours.    Estimated Creatinine Clearance: 71.4 mL/min (A) (based on SCr of 1.6 mg/dL (H)).    Diagnostic Results:  I have reviewed all pertinent imaging results/findings within the past 24 hours.    Assessment and Plan:     Multi-organ failure with heart failure  Mr. Doe is a 43yoM admitted w STEMI underwent PCI to LM/LAD and developed cardiogenic shock, briefly on IABP, followed by Impella CP and high doses of epinephrine. Eventually progressing to multiple organ failure requiring Tandem Heart VAD with VA-ECMO on 03/29//19. Further complicated by developing ALI of the LLE s/p urgent Left AKA with CTS on 03/30/19. Since amputation improvement of his hemodynamic status with adequate ECMO flows ~ 5L and speed 4300. Clearing Lactic acid, LFTs and CPK down trending. Remained intubation, off sedation in the morning to evaluate for neurological recovery.   - CTS primary Dr. Worrell / Lita   - HTS will follow along, please call for any questions or concerns  - Wean off vasopressor support as tolerated   - Recommend continued optimization from his Cardiogenic shock standpoint  - Agree with holding sedation to evaluation neurological status  - Monitor Lactic, CMP, and CPK until normalizes   - Wean off Vent support as tolerated           Kareem Collins  Jami Butler MD  Heart Transplant  [facility]

## 2019-04-01 NOTE — EICU
Called into patient's room via eElert for cardioversion. Patient continues with VTACH; cardioversion 200J x2 shocks at 0748. Patient remains in VTACH; rate slowed. MD changed sensitivity on pacemaker to 1mV. Will continue to monitor.

## 2019-04-01 NOTE — PROGRESS NOTES
Daily checks done    Calcium Bath changed to 3.5mmol/L as ordered. Still on 2.0k bath    Currently on UF rateof  450cc/hr

## 2019-04-01 NOTE — HPI
Mr. Ac is a 43 year old man admitted on 3/29/19 with an NSTEMI after 1 week of chest pain. He was taken to the cath lab for successful PCI. Since admission he has been intubated and sedated on propofol. His admission has been complicated by cardiogenic shock, JACOBY secondary to ATN requiring CRRT, Vtach requiring amiodarone infusion and cardioversion, and left ischemic limb requiring above knee amputation. He was placed on ECMO on 3/29/19.    On 4/1/19 his propofol was held around 0755, and he showed little improvement in his mental status. A CT head was ordered and revealed a right PCA infarct for which we are consulted. He is on heparin infusion for ECMO.    The family has been heavily involved in his care, and express wishes to continue full care.
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
## 2019-04-01 NOTE — PROGRESS NOTES
Dr. London at bedside. Updated on labs, mottled right hand, pulses detectable with doppler and no change in neuro status. Pt will not withdraw to pain, move extremities or follow any commands.

## 2019-04-01 NOTE — SUBJECTIVE & OBJECTIVE
Interval History:   Patient evaluated at bedside, continues on mechanical ventilation FiO2 40%, On SLED for clearance and volume removal. On ECMO. NET negative 2.7 L yesterday.     Review of patient's allergies indicates:  No Known Allergies  Current Facility-Administered Medications   Medication Frequency    0.9%  NaCl infusion (CRRT USE ONLY) Continuous    0.9%  NaCl infusion (for blood administration) Q24H PRN    acetaminophen tablet 650 mg Q4H PRN    acetaminophen tablet 650 mg Q6H PRN    amiodarone (CORDARONE) 50 mg/mL injection     amiodarone 360 mg/200 mL (1.8 mg/mL) infusion Continuous    amiodarone in dextrose 150 mg/100 mL (1.5 mg/mL) loading dose 150 mg Once    amiodarone in dextrose 150 mg/100 mL (1.5 mg/mL) loading dose 150 mg Once    aspirin chewable tablet 81 mg Daily    bisacodyl suppository 10 mg Q12H PRN    calcium gluconate 2 g in dextrose 5 % 100 mL IVPB Once    chlorhexidine 0.12 % solution 15 mL BID    clopidogrel tablet 75 mg Daily    dextrose 5 % and 0.9 % NaCl with KCl 20 mEq infusion Continuous    dextrose 50% injection 12.5 g PRN    EPINEPHrine (ADRENALIN) 10 mg in sodium chloride 0.9% 250 mL infusion Continuous    fluconazole (DIFLUCAN) IVPB 400 mg 200 mL Q24H    glucagon (human recombinant) injection 1 mg PRN    heparin 25,000 units in dextrose 5% 250 mL (100 units/mL) infusion (heparin infusion) Continuous    hydrocortisone sodium succinate injection 100 mg Q8H    insulin aspart U-100 pen 0-5 Units Q6H PRN    lidocaine 2000 mg in dextrose 5% 250 mL infusion Continuous    magnesium sulfate 2g in water 50mL IVPB (premix) PRN    magnesium sulfate 2g in water 50mL IVPB (premix) Once    mupirocin 2 % ointment 1 g BID    norepinephrine 16 mg in dextrose 5 % 250 mL infusion Continuous    ondansetron disintegrating tablet 8 mg Q8H PRN    ondansetron injection 4 mg Q12H PRN    oxyCODONE immediate release tablet 5 mg Q4H PRN    oxyCODONE immediate release tablet Tab  10 mg Q4H PRN    pantoprazole injection 40 mg BID    polyethylene glycol packet 17 g Daily    propofol (DIPRIVAN) 10 mg/mL infusion Continuous    [START ON 4/2/2019] vancomycin 1750 mg in 0.9% sodium chloride 500 mL IVPB Q24H    vasopressin (PITRESSIN) 0.2 Units/mL in dextrose 5 % 100 mL infusion Continuous       Objective:     Vital Signs (Most Recent):  Temp: 98.5 °F (36.9 °C) (04/01/19 0830)  Pulse: 72 (04/01/19 0900)  Resp: (!) 0 (04/01/19 0900)  BP: (!) 78/71 (03/31/19 1500)  SpO2: 100 % (04/01/19 0830)  O2 Device (Oxygen Therapy): ventilator (04/01/19 0857) Vital Signs (24h Range):  Temp:  [98.2 °F (36.8 °C)-98.7 °F (37.1 °C)] 98.5 °F (36.9 °C)  Pulse:  [0-186] 72  Resp:  [0-36] 0  SpO2:  [99 %-100 %] 100 %  BP: (76-79)/(71-74) 78/71  Arterial Line BP: ()/() 77/73     Weight: 106 kg (233 lb 11 oz) (03/31/19 0500)  Body mass index is 34.51 kg/m².  Body surface area is 2.27 meters squared.    I/O last 3 completed shifts:  In: 23470 [I.V.:25041; Blood:733]  Out: 66062 [Other:31917; Blood:40]    Physical Exam   Constitutional: He appears ill. He is intubated.   HENT:   Head: Normocephalic and atraumatic.   Eyes: Right eye exhibits no discharge. Left eye exhibits no discharge.   Cardiovascular:   RRR. Patient connected to ECMO circuit    Pulmonary/Chest: He is intubated. No respiratory distress.   Abdominal: He exhibits no distension.   Genitourinary:   Genitourinary Comments: Trujillo in place   Musculoskeletal:   Left above knee amputation    Skin: Skin is warm.       Significant Labs:  ABGs:   Recent Labs   Lab 04/01/19  0804   PH 7.366   PCO2 48.1*   HCO3 27.5   POCSATURATED 74*   BE 2     BMP:   Recent Labs   Lab 04/01/19  0319   GLU 97  97     100   CO2 24  24   BUN 8  8   CREATININE 1.3  1.3   CALCIUM 9.8  9.8   MG 2.5  2.5     CBC:   Recent Labs   Lab 04/01/19  0721 04/01/19  0735   WBC 15.92*  --    RBC 3.24*  --    HGB 10.0*  --    HCT 28.6* 26*   PLT 60*  --    MCV 88  --     MCH 30.9  --    MCHC 35.0  --      CMP:   Recent Labs   Lab 04/01/19  0319   GLU 97  97   CALCIUM 9.8  9.8   ALBUMIN 3.4*  3.4*   PROT 5.9*     140   K 4.1  4.1  4.1   CO2 24  24     100   BUN 8  8   CREATININE 1.3  1.3   ALKPHOS 167*   ALT 2,328*   AST 7,086*   BILITOT 6.9*     All labs within the past 24 hours have been reviewed.

## 2019-04-01 NOTE — PT/OT/SLP PROGRESS
Occupational Therapy      Patient Name:  Subhash Ac   MRN:  [id number]    Patient not seen today secondary to not appropriate for therapy at this time as pt s/p VA ECMO and LE amputation since placement of OT/PT orders. Will follow-up with new MD orders.    EBEN Fair  4/1/2019
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
## 2019-04-01 NOTE — PROGRESS NOTES
[facility]  Critical Care - Surgery  Progress Note    Patient Name: Subhash Ac  MRN: [id number]  Admission Date: 3/29/2019  Hospital Length of Stay: 3 days  Code Status: Full Code  Attending Provider: Berlin Calderon MD  Primary Care Provider: Primary Doctor No   Principal Problem: Cardiogenic shock    Subjective:     Hospital/ICU Course:  Admitted on 3/29/19 for STEMI and taken for [facility] w PCI to LM-LAD, IABP placement and immediate removal followed by placement of Impella CP. Patient then placed on Tandem heart VAD with ECMO. Transferred to SICU for postop care.         Interval History/Significant Events:   POD2 s/p L AKA with CTS; vtach this AM. Continuing on ecmo, crrt and Remains on following gtts    sodium chloride 0.9% 200 mL/hr at 03/31/19 1139    amiodarone in dextrose 5% 1 mg/min (04/01/19 0705)    calcium gluconate IVPB      cisatracurium (NIMBEX) infusion 1 mcg/kg/min (03/30/19 1129)    dexmedetomidine (PRECEDEX) infusion Stopped (03/29/19 2200)    dextrose 5 % and 0.9 % NaCl with KCl 20 mEq 5 mL/hr (03/29/19 2119)    DOBUTamine Stopped (03/30/19 0000)    epinephrine infusion 0.06 mcg/kg/min (04/01/19 0600)    heparin (porcine) in 5 % dex 1,800 Units/hr (04/01/19 0600)    lidocaine 1 mg/min (04/01/19 0600)    niCARdipine Stopped (03/29/19 2015)    norepinephrine bitartrate-D5W 0.07 mcg/kg/min (04/01/19 0600)    propofol 25 mcg/kg/min (04/01/19 0600)    vasopressin (PITRESSIN) infusion 0.05 Units/min (04/01/19 0600)         Follow-up For: Procedure(s) (LRB):  LEFT ABOVE KNEE AMPUTATION REMOVAL OF IMPELLA 5.0 (Left)  FASCIOTOMY 3 COMPARTMENT (Left)    Post-Operative Day: 2 Days Post-Op    Objective:     Vital Signs (Most Recent):  Temp: 98.7 °F (37.1 °C) (04/01/19 0300)  Pulse: 86 (04/01/19 0700)  Resp: 10 (04/01/19 0700)  BP: (!) 78/71 (03/31/19 1500)  SpO2: 100 % (04/01/19 0700) Vital Signs (24h Range):  Temp:  [98.2 °F (36.8 °C)-98.7 °F (37.1 °C)] 98.7 °F (37.1  °C)  Pulse:  [] 86  Resp:  [0-36] 10  SpO2:  [99 %-100 %] 100 %  BP: (76-79)/(71-74) 78/71  Arterial Line BP: ()/() 63/59     Weight: 106 kg (233 lb 11 oz)  Body mass index is 34.51 kg/m².      Intake/Output Summary (Last 24 hours) at 4/1/2019 0707  Last data filed at 4/1/2019 0600  Gross per 24 hour   Intake 7751 ml   Output 9905 ml   Net -2154 ml       Physical Exam   Constitutional:   Intubated.   Off sedation- no appreciated neuro exam   HENT:   Head: Normocephalic and atraumatic.   Cardiovascular:   RRR. Patient connected to ECMO circuit    Pulmonary/Chest: No respiratory distress.   Intubated, PEEP minimal vent settings now that on ECMO   Abdominal: He exhibits no distension.   Genitourinary:   Genitourinary Comments: Trujillo in place   Musculoskeletal:      L AKA stump intact. Prevena wound vac in place    Skin:   Skin is cool to touch          Vents:  Vent Mode: A/C (04/01/19 0508)  Ventilator Initiated: Yes (03/29/19 0333)  Set Rate: 10 bmp (04/01/19 0508)  Vt Set: 300 mL (04/01/19 0508)  Pressure Support: 0 cmH20 (03/29/19 1341)  PEEP/CPAP: 8 cmH20 (04/01/19 0508)  Oxygen Concentration (%): 40 (04/01/19 0700)  Peak Airway Pressure: 46 cmH2O (04/01/19 0508)  Plateau Pressure: 27 cmH20 (04/01/19 0508)  Total Ve: 2.63 mL (04/01/19 0508)  F/VT Ratio<105 (RSBI): (!) 18.87 (04/01/19 0508)    Lines/Drains/Airways     Central Venous Catheter Line                 Introducer 03/29/19 2015 left femoral vein 2 days         Percutaneous Central Line Insertion/Assessment - triple lumen  03/29/19 2015 left internal jugular 2 days         Trialysis (Dialysis) Catheter 03/29/19 2015 left internal jugular 2 days          Drain                 NG/OG Tube 03/29/19 0300 Center mouth 3 days          Airway                 Airway - Non-Surgical 03/29/19 0223 Endotracheal Tube 3 days          Arterial Line                 Arterial Line 03/29/19 2015 Right Radial 2 days          Line                 Pacer Wires  03/29/19 2015 2 days         VAD 03/30/19 0000 Right ventricular assist device 2 days          Peripheral Intravenous Line                 Peripheral IV - Single Lumen 03/29/19 0039 Left Antecubital 3 days                Significant Labs:    CBC/Anemia Profile:  Recent Labs   Lab 03/31/19 2001 03/31/19 2358 04/01/19 0312 04/01/19 0319 04/01/19  0404   WBC 15.12*  --  14.03*  --   --  13.73*  --    HGB 10.3*  --  9.8*  --   --  9.8*  --    HCT 29.8*   < > 28.4*   < > 26* 27.7* 25*   PLT 75*  --  59*  --   --  61*  --    MCV 88  --  87  --   --  87  --    RDW 15.4*  --  15.4*  --   --  15.4*  --     < > = values in this interval not displayed.        Chemistries:  Recent Labs   Lab 03/31/19 2002 03/31/19 2203 03/31/19 2358 04/01/19 0319     139 140 141  141 140  140   K 4.7  4.7  4.7  4.7 4.7 4.5  4.5  4.5  4.5 4.1  4.1  4.1     103 104 103  103 100  100   CO2 22*  22* 22* 23  23 24  24   BUN 8  8 8 8  8 8  8   CREATININE 1.3  1.3 1.3 1.3  1.3 1.3  1.3   CALCIUM 9.1  9.1 9.1 9.3  9.3 9.8  9.8   ALBUMIN 3.2*  3.2* 3.3* 3.6  3.6 3.4*  3.4*   PROT 4.6*  --  5.8* 5.9*   BILITOT 6.3*  --  6.5* 6.9*   ALKPHOS 151*  --  162* 167*   ALT 3,075*  --  2,490* 2,328*   AST 9,973*  --  8,019* 7,086*   MG 2.3 2.5 2.5 2.5  2.5   PHOS 3.4  3.4 2.9 2.7  2.7 2.8  2.8       ABGs:   Recent Labs   Lab 04/01/19  0404   PH 7.455*   PCO2 40.5   HCO3 28.5*   POCSATURATED 100   BE 5       Significant Imaging:  I have reviewed all pertinent imaging results/findings within the past 24 hours.    Assessment/Plan:     ST elevation myocardial infarction (STEMI)  43 y.o. male s/p STEMI and taken for [facility] w PCI to LM-LAD, IABP placement and immediate removal followed by placement of Impella CP. Patient then placed on Tandem heart VAD with ECMO on 3/29.  Patient taken back to OR for left AKA 3/30 with removal of impella    Neuro:   - Intubated and sedated on Propofol gtt  - Off of Nimbex previously,  restarted   - BIS monitor with depressed activity off sedation      Pulmonary:   - Intubated  - ECMO running  - Daily CXRs while intubated  - ABGs PRN  - BTs, DuoNebs    Cardiac:   - 3/28-3/29: code STEMI and taken for [facility] w PCI to LM-LAD, IABP placement and immediate removal followed by placement of Impella CP. Continued heart failure then led to placement of Tandem heart VAD with ECMO.  Impella removed.  - Epi, Norepi, Vaso gtt's  - NO at 10ppm  - Wean pressors per CTS  - Monitor chest tube output  - Anticoagulation per CTS    Renal:    - Anuric on CRRT   - Trend BUN/Cr  - Nephrology on board    ID:   - Tmax 98.7  - Leukocytosis 26 postop, continue to trenddown currently 13  - Trend WBC daily  - Ancef    Hem/Onc:   - H/H stable  - Transfuse per CTS  - heparin gtt    Endocrine:    - Insulin gtt for glucose control  - Endocrine consulted, appreciate assistance    Fluids/Electrolytes/Nutrition/GI:   - Replace electrolytes PRN  - mIVF per CTS  - Currently NPO, diet per CTS  - LFTs indicative of shock liver, started on NAC    PPx:  - DVT: heparin gtt, SCDs  - PUP:  Protonix    DISPO:  - Continue SICU care  - Goals of care discussion per primary     Primary: CTS        Critical secondary to Patient has a condition that poses threat to life and bodily function: Acute Myocardial Infarction     Critical care was time spent personally by me on the following activities: development of treatment plan with patient or surrogate and bedside caregivers, discussions with consultants, evaluation of patient's response to treatment, examination of patient, ordering and performing treatments and interventions, ordering and review of laboratory studies, ordering and review of radiographic studies, pulse oximetry, re-evaluation of patient's condition.  This critical care time did not overlap with that of any other provider or involve time for any procedures.     Belem Segura MD  Critical Care - Surgery  [facility]  [place]

## 2019-04-01 NOTE — PROGRESS NOTES
Dr. London at bedside and updated on all vital signs, neuro status, gtts and lab results. Orders received to administer 1 unit of PRBC and obtain a Ct of the head.

## 2019-04-01 NOTE — PLAN OF CARE
Problem: Adult Inpatient Plan of Care  Goal: Plan of Care Review  Outcome: Ongoing (interventions implemented as appropriate)  Pt remains on VA ecmo at Speed of 4500 RPM, Flows 4.8-4.9, Sweep at 6.5. Pt remains intubated on A/C 40% and 8 of PEEP. Propofol off. Pt maintained on Epi, Vaso, Levo, Lidocaine, Amiodarone, and Heparin gtts. Pt on CRRT overnight with a UF at 150 and tolerating well, pt net negative last 24hrs. Dopplerable pulses present on upper and lower extremity. Pt not following commands, no gag or cough present on ventilator, not withdrawing to pain. Pt does have pupil response 3 bilateral brisk and even. Pt wife updated on pt current status and plan of care by myself and Dr. Coley this morning. See flow sheet for details and assessments.

## 2019-04-01 NOTE — PLAN OF CARE
"Problem: Spiritual Distress Risk or Actual  Goal: Spiritual Wellbeing    Intervention: Promote Spiritual Wellbeing  F - Kyra and Belief: [employer].  Pt's dad expressed consolation in knowing his son recently became a [employer], implying that regardless what happens with pt's physical condition his soul "was saved."     I - Importance: Yes    C - Community: Dad mentioned pt's Jewish and  are involved in pt's care.     A - Address in Care: Introduced and offered pastoral support. Pt's wife and dad present. Offered support with reflective listening, prayer, and spiritual assessment. Family relying on their kyra for strength, and made aware of 's presence as needed.  will continue to follow.           "

## 2019-04-01 NOTE — PROGRESS NOTES
Dr. Warner with CTS/Transplant notified and aware of following lab value:    Results for JENAE HANSON (MRN [id number]) as of 4/1/2019 04:22   Ref. Range 4/1/2019 03:19   Hemoglobin Latest Ref Range: 14.0 - 18.0 g/dL 9.8 (L)   Hematocrit Latest Ref Range: 40.0 - 54.0 % 27.7 (L)     No new orders to transfuse PRBCs at this time. Pt currently hemodynamically stable, will continue to monitor.

## 2019-04-01 NOTE — PROGRESS NOTES
Propofol gtt turned off for neuro assessement. Pt in sinus rhythm. Pt not withdrawing to pain or stimuli, not moving any extremity or following any commands.

## 2019-04-01 NOTE — ASSESSMENT & PLAN NOTE
Neuro:   - Intubated   -Sedation held to access neuro exam. No response at this time  - Off nimbex   - no pain medications     Pulmonary:   - Intubated  Vent Mode: A/C  Oxygen Concentration (%):  [] 40  Resp Rate Total:  [10 br/min-45 br/min] 10 br/min  Vt Set:  [300 mL] 300 mL  PEEP/CPAP:  [5 cmH20-8 cmH20] 8 cmH20  Mean Airway Pressure:  [7.4 oiS13-23 cmH20] 11 cmH20  - ECMO running- started overnight 3/29  - Daily CXRs while intubated  - ABGs PRN  - BTs, DuoNebs     Cardiac:  - Impella removed in OR 3/30  - Epi, Norepi, Vaso, wean as tolerated  - NO at 13ppm  - Monitor chest tube output  - Anticoagulation per CTS      -Vtach overnight requiring lidocaine and mag, converted back to NSR     Renal:    - Anuric on CRRT at 200 UF rate  - Trend BUN/Cr  - Nephrology following      ID:   - Afebrile   -Ischemic LLE- s/p L AKA on 3/30   - Leukocytosis decreasing   -lactate decreasing 4-5 (from >12)  - Trend WBC daily  - Ancef, diflucan, vanc     Hem/Onc:   - H/H stable   - 1uPRBC overnight. 4uPRBC intraoperatively.  - heparin gtt with PTT goal 50-70     Endocrine:    - Insulin gtt for glucose control  - Endocrine consulted, appreciate assistance     Fluids/Electrolytes/Nutrition/GI:   - NPO  - Replace electrolytes PRN  - bicarb bath with crrt   -replaced calcium  -Patient with shock liver- with AST and ALT starting to downtrend. Will continued to monitor     PPx:  - DVT: heparin gtt, SCDs     DISPO:  - Continue SICU care

## 2019-04-01 NOTE — HPI
43 year old male who was admitted on 3/29/19 with delayed STEMI and fulminant cardiogenic shock. Patient was taken to the cath lab emergently and found to have 100% ostial LM lesion that was successfully underwent PCI and stented while on IABP support. This was followed by immediate removal of IABP and placement of Impella CP. He was then admitted to the CCU for close monitoring and further care. He continued to become hemodynamically unstable despite escalating doses of pressors. CTS was consulted and patient went for [facility] ECMO placement. He developed acute renal failure requiring dialysis, shock liver and severe rhabdomyolysis with ultimate amputation of the left lower extremity. He was found to have LV thrombus for which anticoagulation with heparin drip was started. On 4/1/2019 at 6:45 AM, pt went into MMVT with HR >200's. He was bolused with amiodarone and lidocaine and started on continous infusions. His sedation with propofol was increased. EP was emergently consulted and per discussion with the primary team it was decided to cardiovert patient with synchronized delivery of 200 Joules twice that temporarily put him in NSR and ultimately slow VT. A temporary pacer wire was placed when he was placed on ECMO, the pacer settings were adjusted to over pace him out of VT and currently patient is in NSR.
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
## 2019-04-01 NOTE — PROGRESS NOTES
Decision made by Dr. Keenan with EP at bedside to increase lidocaine gtt to 4mg/min.  The decision was then made to electrically cardiovert the patient. The patient was cardioverted @ 200J x 2. With both shocks the patients rate dropped to 50's - 60's sinus rhythm with a subsequent rise in rate back to the 200's. The patient's pacer setting were then adjusted and changed to VVI with a rate of 50 at 10mA with a sensitivity of 1.0. The patient then returned Sinus rhythm with a rate in the 70s. MAP markedly decreased at this time. Levophed rate intermittently increased during episode of hypotension, 1g of Calcium Chloride ordered and administered with improvement in MAP now to 60s. Current gtts - Epi @ 0.06, Vaso @ 0.05, Levophed @ 0.07, Amiodarone @ 1mg/min, Lidocaine @ 4mg/min, Heparin @ 1,800 units/hr, Propofol currently off.

## 2019-04-01 NOTE — SUBJECTIVE & OBJECTIVE
Interval History:   Had MMVT at night and received amiodarone and DCCV, followed by slow VT on 140 for which was paced at 160 then returned to sinus.  Transaminases are still on the 4K and 1K but trending down  CT head showed large stroke   Still intubated on [facility] ECMO    Continuous Infusions:   sodium chloride 0.9% 200 mL/hr at 04/01/19 1800    amiodarone in dextrose 5% 0.05 mg/min (04/01/19 1800)    dextrose 5 % and 0.9 % NaCl with KCl 20 mEq 5 mL/hr (03/29/19 2119)    epinephrine infusion 0.06 mcg/kg/min (04/01/19 1800)    heparin (porcine) in 5 % dex 1,400 Units/hr (04/01/19 1803)    lidocaine 4 mg/min (04/01/19 1800)    norepinephrine bitartrate-D5W 0.07 mcg/kg/min (04/01/19 1800)    propofol Stopped (04/01/19 0825)    vasopressin (PITRESSIN) infusion 0.05 Units/min (04/01/19 1800)     Scheduled Meds:   amiodarone in dextrose  150 mg Intravenous Once    aspirin  81 mg Oral Daily    calcium gluconate IVPB  2 g Intravenous Once    chlorhexidine  15 mL Mouth/Throat BID    clopidogrel  75 mg Oral Daily    fluconazole (DIFLUCAN) IVPB  400 mg Intravenous Q24H    hydrocortisone sodium succinate  100 mg Intravenous Q8H    mupirocin  1 g Nasal BID    pantoprazole  40 mg Intravenous BID    polyethylene glycol  17 g Oral Daily    [START ON 4/2/2019] vancomycin (VANCOCIN) IVPB  1,750 mg Intravenous Q24H     PRN Meds:sodium chloride, acetaminophen, acetaminophen, bisacodyl, dextrose 50%, glucagon (human recombinant), insulin aspart U-100, magnesium sulfate IVPB, ondansetron, ondansetron, oxyCODONE, oxyCODONE    Review of patient's allergies indicates:  No Known Allergies  Objective:     Vital Signs (Most Recent):  Temp: 98 °F (36.7 °C) (04/01/19 1530)  Pulse: 60 (04/01/19 1815)  Resp: (!) 1 (04/01/19 1815)  BP: (!) 78/71 (03/31/19 1500)  SpO2: (!) 79 % (04/01/19 1445) Vital Signs (24h Range):  Temp:  [98 °F (36.7 °C)-98.7 °F (37.1 °C)] 98 °F (36.7 °C)  Pulse:  [0-186] 60  Resp:  [0-23] 1  SpO2:  [79 %-100  %] 79 %  Arterial Line BP: ()/(47-88) 63/63     Patient Vitals for the past 72 hrs (Last 3 readings):   Weight   03/31/19 0500 106 kg (233 lb 11 oz)     Body mass index is 34.51 kg/m².      Intake/Output Summary (Last 24 hours) at 4/1/2019 1830  Last data filed at 4/1/2019 1800  Gross per 24 hour   Intake 49336.17 ml   Output 9160 ml   Net 3748.17 ml       Physical Exam   Constitutional:   Cool to touch. Intubated and paralyzed   HENT:   Head: Normocephalic and atraumatic.   Cardiovascular:   Impella in place. Patient connected to ECMO circuit    Pulmonary/Chest: No respiratory distress.   Intubated, PEEP minimal vent settings now that on ECMO   Abdominal: He exhibits no distension.   Genitourinary:   Genitourinary Comments: [place] in place   Musculoskeletal:   Paralyzed.   LLE cool to touch. Anterior shin modeled    Skin:   Skin is cool to touch   Psychiatric:   sedated       Significant Labs:  CBC:  Recent Labs   Lab 04/01/19  0721  04/01/19  1156 04/01/19  1503 04/01/19  1558   WBC 15.92*  --  12.79* 13.12*  --    RBC 3.24*  --  3.22* 3.25*  --    HGB 10.0*  --  9.8* 10.0*  --    HCT 28.6*   < > 28.8* 28.8* 25*   PLT 60*  --  43* 60*  --    MCV 88  --  89 89  --    MCH 30.9  --  30.4 30.8  --    MCHC 35.0  --  34.0 34.7  --     < > = values in this interval not displayed.     BNP:  Recent Labs   Lab 03/29/19  0052   *     CMP:  Recent Labs   Lab 04/01/19  0721 04/01/19  1012 04/01/19  1503   GLU 93 98 112*   CALCIUM 9.8 10.2 10.5   ALBUMIN 3.3* 3.1* 3.1*   PROT 4.7* 4.5* 5.0*    138 138   K 4.0 4.1 3.9  3.9   CO2 21* 19* 19*    105 105   BUN 8 8 13   CREATININE 1.2 1.1 1.6*   ALKPHOS 159* 152* 164*   ALT 2,114* 1,934* 1,770*   AST 5,947* 5,100* 4,237*   BILITOT 7.3* 7.3* 7.9*      Coagulation:   Recent Labs   Lab 04/01/19  0319 04/01/19  0757 04/01/19  0806 04/01/19  1503 04/01/19  1637   INR 2.3* 2.1*  --  2.2*  --    APTT 78.2*  --  84.7* 78.9* 78.9*     LDH:  Recent Labs   Lab  03/31/19  1300 04/01/19  0319   LDH 13,050* 7,792*     Microbiology:  Microbiology Results (last 7 days)     Procedure Component Value Units Date/Time    Blood culture [[id number]]     Order Status:  Canceled Specimen:  Blood     Blood culture [[id number]]     Order Status:  Canceled Specimen:  Blood           I have reviewed all pertinent labs within the past 24 hours.    Estimated Creatinine Clearance: 71.4 mL/min (A) (based on SCr of 1.6 mg/dL (H)).    Diagnostic Results:  I have reviewed all pertinent imaging results/findings within the past 24 hours.

## 2019-04-02 NOTE — PROGRESS NOTES
Dr. London at bedside and updated on vitals, labs, neuro status, gtts and CVP. Orders recei to give platelets, start tube feed, decrease lidocaine 3mg/min

## 2019-04-02 NOTE — PROGRESS NOTES
[facility]  Critical Care - Surgery  Progress Note    Patient Name: Subhash Ac  MRN: [id number]  Admission Date: 3/29/2019  Hospital Length of Stay: 4 days  Code Status: Full Code  Attending Provider: Berlin Calderon MD  Primary Care Provider: Primary Doctor No   Principal Problem: Cardiogenic shock    Subjective:     Hospital/ICU Course:  Admitted on 3/29/19 for STEMI and taken for [facility] w PCI to LM-LAD, IABP placement and immediate removal followed by placement of Impella CP. Patient then placed on Tandem heart VAD with ECMO. Transferred to SICU for postop care.         Interval History/Significant Events:   POD3 s/p L AKA; levo and vaso gtts turned off overnight, only remaining on epi at 0.06 currently. Pt exhibiting facial grimacing to oral stimulation- no peripheral withdrawal to pain and pupils remain reactive. Off sedation and paralysis. Transfused 1 unit per CTS this AM     Follow-up For: Procedure(s) (LRB):  LEFT ABOVE KNEE AMPUTATION REMOVAL OF IMPELLA 5.0 (Left)  FASCIOTOMY 3 COMPARTMENT (Left)  REPAIR, ARTERY, FEMORAL  AMPUTATION, ABOVE KNEE    Post-Operative Day: 3 Days Post-Op    Objective:     Vital Signs (Most Recent):  Temp: 98.5 °F (36.9 °C) (04/02/19 0623)  Pulse: 78 (04/02/19 0708)  Resp: (!) 36 (04/02/19 0708)  BP: (!) 78/71 (03/31/19 1500)  SpO2: 100 % (04/02/19 0708) Vital Signs (24h Range):  Temp:  [98 °F (36.7 °C)-99 °F (37.2 °C)] 98.5 °F (36.9 °C)  Pulse:  [0-186] 78  Resp:  [0-38] 36  SpO2:  [79 %-100 %] 100 %  Arterial Line BP: ()/() 85/78     Weight: 106 kg (233 lb 11 oz)  Body mass index is 34.51 kg/m².      Intake/Output Summary (Last 24 hours) at 4/2/2019 0721  Last data filed at 4/2/2019 0700  Gross per 24 hour   Intake 7471.54 ml   Output 9142 ml   Net -1670.46 ml       Physical Exam   Constitutional:   Cool to touch. Intubated; facial grimace with stimulation     HENT:   Head: Normocephalic and atraumatic.   Cardiovascular:   Patient connected to  ECMO circuit    Pulmonary/Chest: No respiratory distress.   Intubated, PEEP minimal vent settings now that on ECMO   Abdominal: He exhibits no distension.   Genitourinary:   Genitourinary Comments: [place] in place   Musculoskeletal:   S/p L AKA     Skin:   Skin is cool to touch       Vents:  Vent Mode: A/C (04/02/19 0708)  Ventilator Initiated: Yes (03/29/19 0333)  Set Rate: 10 bmp (04/02/19 0708)  Vt Set: 300 mL (04/02/19 0708)  Pressure Support: 0 cmH20 (03/29/19 1341)  PEEP/CPAP: 8 cmH20 (04/02/19 0708)  Oxygen Concentration (%): 40 (04/02/19 0708)  Peak Airway Pressure: 32 cmH2O (04/02/19 0708)  Plateau Pressure: 27 cmH20 (04/02/19 0708)  Total Ve: 1.7 mL (04/02/19 0708)  F/VT Ratio<105 (RSBI): (P) 734.69 (04/02/19 0708)    Lines/Drains/Airways     Central Venous Catheter Line                 ECMO Cannula 03/29/19 1800 right femoral artery 3 days         ECMO Cannula 03/29/19 1800 right internal jugular 3 days         Introducer 03/29/19 2015 left femoral vein 3 days         Percutaneous Central Line Insertion/Assessment - triple lumen  03/29/19 2015 left internal jugular 3 days         Trialysis (Dialysis) Catheter 03/29/19 2015 left internal jugular 3 days          Drain                 NG/OG Tube 03/29/19 0300 Center mouth 4 days          Airway                 Airway - Non-Surgical 03/29/19 0223 Endotracheal Tube 4 days          Arterial Line                 Arterial Line 03/29/19 2015 Right Radial 3 days          Line                 Pacer Wires 03/29/19 2015 3 days         VAD 03/30/19 0000 Right ventricular assist device 3 days          Peripheral Intravenous Line                 Peripheral IV - Single Lumen 03/29/19 0039 Left Antecubital 4 days                Significant Labs:    CBC/Anemia Profile:  Recent Labs   Lab 04/01/19  1933  04/02/19  0001 04/02/19  0105 04/02/19  0340 04/02/19  0409   WBC 14.51*  --  15.41*  --  16.16*  --    HGB 9.9*  --  9.8*  --  9.6*  --    HCT 29.4*   < > 29.5* 27* 29.6*  27*   PLT 49*  --  30*  --  29*  --    MCV 87  --  88  --  88  --    RDW 15.7*  --  15.7*  --  15.8*  --     < > = values in this interval not displayed.        Chemistries:  Recent Labs   Lab 04/01/19  1933 04/01/19  2137 04/01/19  2345 04/02/19  0340 04/02/19  0345    138 137 137  137  --    K 3.7  3.7 3.8 4.5  4.5  4.5 4.1  4.1  4.1 4.1    106 106 107  107  --    CO2 24 23 22* 21*  21*  --    BUN 10 10 10 9  9  --    CREATININE 1.2 1.1 1.1 1.1  1.1  --    CALCIUM 10.1 10.3 10.2 10.0  10.0  --    ALBUMIN 3.2* 3.1* 3.1* 3.0*  3.0*  --    PROT 4.8*  --  4.5* 4.3*  --    BILITOT 8.9*  --  9.3* 9.8*  --    ALKPHOS 175*  --  174* 170*  --    ALT 1,644*  --  1,575* 1,492*  --    AST 3,574*  --  3,230* 2,795*  --    MG 2.1 1.9 2.0 2.1 1.9   PHOS 2.3* 2.3* 2.3* 2.0*  2.0*  --        Significant Imaging:  I have reviewed all pertinent imaging results/findings within the past 24 hours.    Assessment/Plan:     ST elevation myocardial infarction (STEMI)  43 y.o. male s/p STEMI and taken for [facility] w PCI to LM-LAD, IABP placement and immediate removal followed by placement of Impella CP. Patient then placed on Tandem heart VAD with ECMO on 3/29.  Patient taken back to OR for left AKA 3/30 with removal of impella    Neuro:   - remains intubated, off sedation and paralytic   - facial grimaces noted with stimulation; no peripheral withdrawal to pain  - CT demonstrated recent right PCA infarct 4/1      Pulmonary:   - Intubated  - ECMO running  - Daily CXRs while intubated  - ABGs PRN  - BTs, DuoNebs    Cardiac:   - 3/28-3/29: code STEMI and taken for [facility] w PCI to LM-LAD, IABP placement and immediate removal followed by placement of Impella CP. Continued heart failure then led to placement of Tandem heart VAD with ECMO.  Impella removed.  - Epi, Norepi, Vaso gtt's, currently only on Epi at 0.06  - NO at 10ppm  - Wean pressors per CTS  - Monitor chest tube output  - Anticoagulation per CTS    Renal:    - Anuric  on CRRT   - Trend BUN/Cr  - Nephrology on board    ID:   - afebrile   - Leukocytosis 26 postop, today slightly increased to 16  - Trend WBC daily  - post op ancef completed  - continuing on fluconazole  - started on vanc 4/2    Hem/Onc:   - H/H stable  - Transfuse per CTS, receiving 1 unit 4/2 with H/H of 9/29  - plts 29, decreasing  - heparin gtt    Endocrine:    - Insulin gtt for glucose control  - Endocrine consulted, appreciate assistance    Fluids/Electrolytes/Nutrition/GI:   - Replace electrolytes PRN  - mIVF per CTS  - Currently NPO, diet per CTS  - LFTs indicative of shock liver, started on NAC, LFTs trending down    PPx:  - DVT: heparin gtt, SCDs  - PUP:  Protonix    DISPO:  - Continue SICU care  - Goals of care discussion per primary     Primary: CTS        Critical secondary to Patient has a condition that poses threat to life and bodily function: Acute Myocardial Infarction     Critical care was time spent personally by me on the following activities: development of treatment plan with patient or surrogate and bedside caregivers, discussions with consultants, evaluation of patient's response to treatment, examination of patient, ordering and performing treatments and interventions, ordering and review of laboratory studies, ordering and review of radiographic studies, pulse oximetry, re-evaluation of patient's condition.  This critical care time did not overlap with that of any other provider or involve time for any procedures.     Belem Segura MD  Critical Care - Surgery  [facility]

## 2019-04-02 NOTE — PROGRESS NOTES
Pt will respond to pain with only facial grimaces, when face or mouth is cleaned or touched. Facial grimaces are not consistent. Pt does not respond to pain or stimuli with any extremity. Pt will not follow any commands. Pupils are reactive and brisk.

## 2019-04-02 NOTE — PLAN OF CARE
Problem: Adult Inpatient Plan of Care  Goal: Plan of Care Review  Outcome: Ongoing (interventions implemented as appropriate)  VSS throughout shift, ex BP increased with adjusting for xray. Turned off Levo and Vaso, MAP 78. Pupils remained equal, round, and reactive to light. Facial grimaces noted when cleansing face and oral swabs. Bit down on oral suction. No extremity movement, does not withdraw from pain. H&H continued to trend down, infused one unit PRBC. Notified MD of platelet count, no replacement ordered at this time. CRRT ran throughout the night. Assessed radial, posterior tibial, and dorsalis pedis pulses with doppler. All but DP are pulsatile. Will continue to monitor and implement.

## 2019-04-02 NOTE — PROGRESS NOTES
[facility]  Vascular Neurology  Comprehensive Stroke Center  Progress Note    Assessment/Plan:     * Cardiogenic shock  -- on ECMO    Acute ischemic right PCA stroke  43 year old man admitted with STEMI and cardiogenic shock who has had a complicated course. Ischemic stroke found on imaging as mental status not improving after holding propofol. Etiology is likely cardioembolic. This is a critically ill patient, however this stroke is not contributing to his current mental status and prognosis would be good from a stroke perspective. His poor mentation may be related to lingering effects of propofol in the setting of multiorgan failure.    04/02: Neuro exam remains poor- GCS 3, not completely explained by R PCA infarct. Patient currently on ECMO and CRRT. Recommend continuing current antithrombotic regimen.    Antithrombotics for secondary stroke prevention:  On heparin infusion for ECMO; on aspirin and plavix for CAD/post PCI  Statins for secondary stroke prevention and hyperlipidemia, if present:   Statins: Atorvastatin- 40 mg daily  Aggressive risk factor modification: Obesity, CAD  Rehab efforts: Occupational Therapy, PT/OT/SLP to evaluate and treat, PM&R consult , if and when appropriate per primary team  Diagnostics ordered/pending: None - will defer any vascular imaging for now due to critical illness, and results would not   VTE prophylaxis: On heparin infusion  BP parameters: Infarct: No intervention, SBP <220        Multi-organ failure with heart failure  -- on CRRT   -- LFTs improving  -- management per primary    Lower limb ischemia  -- post left AKA    LFT elevation  -- markedly elevated, trending down    JACOBY (acute kidney injury)  -- due to ATN - nephrology following  -- on CRRT    ST elevation myocardial infarction (STEMI)  -- post successful PCI         04/02/19: Neuro exam remains poor- GCS 3, not completely explained by R PCA infarct. Patient currently on ECMO  and CRRT. Recommend continuing current antithrombotic regimen.    STROKE DOCUMENTATION        NIH Scale:  1a. Level of Consciousness: 3-->Responds only with reflex motor or autonomic effects or totally unresponsive, flaccid, and areflexic  1b. LOC Questions: 2-->Answers neither question correctly  1c. LOC Commands: 2-->Performs neither task correctly  2. Best Gaze: 0-->Normal  3. Visual: 3-->Bilateral hemianopia (blind including cortical blindness)  4. Facial Palsy: 0-->Normal symmetrical movements  5a. Motor Arm, Left: 4-->No movement  5b. Motor Arm, Right: 4-->No movement  6a. Motor Leg, Left: (UN) Amputation or joint fusion  6b. Motor Leg, Right: 4-->No movement  7. Limb Ataxia: 0-->Absent  8. Sensory: 0-->Normal, no sensory loss  9. Best Language: 3-->Mute, global aphasia, no usable speech or auditory comprehension  10. Dysarthria: (UN) Intubated or other physical barrier  11. Extinction and Inattention (formerly Neglect): 0-->No abnormality  Total (NIH Stroke Scale): 25       Modified Whitfield Score: 0  Christopher Coma Scale:    ABCD2 Score:    [id number] Score:   HAS -BLED Score:   ICH Score:   Hunt & Flores Classification:      Hemorrhagic change of an Ischemic Stroke: Does this patient have an ischemic stroke with hemorrhagic changes? No     Neurologic Chief Complaint: altered mental status    Subjective:     Interval History: Patient is seen for follow-up neurological assessment and treatment recommendations: Neuro exam remains poor- GCS 3, not completely explained by R PCA infarct. Patient currently on ECMO and CRRT. Recommend continuing current antithrombotic regimen.    HPI, Past Medical, Family, and Social History remains the same as documented in the initial encounter.     Review of Systems   Constitutional: Positive for activity change and fatigue.   Eyes: Positive for visual disturbance.   Gastrointestinal: Negative for diarrhea and vomiting.   Genitourinary: Positive for decreased urine volume.    Neurological: Positive for weakness.     Scheduled Meds:   aspirin  81 mg Oral Daily    chlorhexidine  15 mL Mouth/Throat BID    clopidogrel  75 mg Oral Daily    fluconazole (DIFLUCAN) IVPB  400 mg Intravenous Q24H    hydrocortisone sodium succinate  100 mg Intravenous Q8H    mupirocin  1 g Nasal BID    pantoprazole  40 mg Intravenous BID    polyethylene glycol  17 g Oral Daily    vancomycin (VANCOCIN) IVPB  1,000 mg Intravenous Q24H     Continuous Infusions:   amiodarone in dextrose 5% 0.5 mg/min (04/02/19 1400)    dextrose 5 % and 0.9 % NaCl with KCl 20 mEq 5 mL/hr (03/29/19 2119)    epinephrine infusion 0.06 mcg/kg/min (04/02/19 1400)    heparin (porcine) in 5 % dex 1,000 Units/hr (04/02/19 1400)    lidocaine 4 mg/min (04/02/19 1400)    norepinephrine bitartrate-D5W Stopped (04/02/19 0600)    vasopressin (PITRESSIN) infusion Stopped (04/02/19 1300)     PRN Meds:sodium chloride, sodium chloride, acetaminophen, acetaminophen, bisacodyl, dextrose 50%, glucagon (human recombinant), insulin aspart U-100, ondansetron, ondansetron, oxyCODONE, oxyCODONE    Objective:     Vital Signs (Most Recent):  Temp: 99 °F (37.2 °C) (04/02/19 1100)  Pulse: 94 (04/02/19 1500)  Resp: 13 (04/02/19 1330)  BP: (!) 78/71 (03/31/19 1500)  SpO2: 100 % (04/02/19 1215)  BP Location: Right arm    Vital Signs Range (Last 24H):  Temp:  [98 °F (36.7 °C)-99 °F (37.2 °C)]   Pulse:  [0-94]   Resp:  [0-42]   SpO2:  [93 %-100 %]   Arterial Line BP: ()/()   BP Location: Right arm    Physical Exam   Constitutional: He appears ill. He is intubated.   HENT:   Head: Normocephalic and atraumatic.   Right Ear: External ear normal.   Left Ear: External ear normal.   Nose: Nose normal.   Cardiovascular: Normal rate.   Pulmonary/Chest: He is intubated.   Neurological: He is unresponsive. GCS eye subscore is 1. GCS verbal subscore is 1. GCS motor subscore is 1.   Skin: Skin is warm and dry.       Neurological Exam:   LOC:  comatose  Attention Span: poor  Language: Intubated  Articulation: Untestable due to intubation  Orientation: Untestable due to intubation  Visual Fields: no BTT bilateral  Pupils (CN II, III): PERRL  Facial Sensation (CN V): Corneal reflex depressed Bilateral  Facial Movement (CN VII): Unable to test   Motor: Arm left  Plegia 0/5  Leg left  Plegia 0/5  Arm right  Plegia 0/5  Leg right Plegia 0/5      Laboratory:  CMP:   Recent Labs   Lab 04/02/19  0920 04/02/19  1054   CALCIUM 10.2 13.6*   ALBUMIN 3.0*  --    PROT 4.5*  --     136   K 4.0  4.0 3.9   CO2 21* 22*    107   BUN 9 9   CREATININE 1.0 1.0   ALKPHOS 179*  --    ALT 1,372*  --    AST 2,375*  --    BILITOT 10.5*  --      BMP:   Recent Labs   Lab 04/02/19  1054      K 3.9      CO2 22*   BUN 9   CREATININE 1.0   CALCIUM 13.6*     CBC:   Recent Labs   Lab 04/02/19  1054  04/02/19  1412   WBC 18.82*  --   --    RBC 3.41*  --   --    HGB 9.7*  --   --    HCT 30.3*   < > 29*   PLT 51*  --   --    MCV 89  --   --    MCH 28.4  --   --    MCHC 32.0  --   --     < > = values in this interval not displayed.     Lipid Panel:   Recent Labs   Lab 03/29/19  0955 04/02/19  0345   CHOL 194  --    LDLCALC 117.8  --    HDL 25*  --    TRIG 256* 160*     Coagulation:   Recent Labs   Lab 04/02/19  0920   INR 1.8*   APTT 63.7*     Platelet Aggregation Study: No results for input(s): PLTAGG, PLTAGINTERP, PLTAGREGLACO, ADPPLTAGGREG in the last 168 hours.  Hgb A1C:   Recent Labs   Lab 03/29/19  0630   HGBA1C 5.3     TSH: No results for input(s): TSH in the last 168 hours.    Diagnostic Results     Brain Imaging     CT head w/o contrast 04/01/19      Large recent right PCA distribution infarct as above.    Mild chronic ischemic change    Vessel Imaging     Cardiac Imaging     TTE 03/29/19  · Severely decreased left ventricular systolic function. The estimated ejection fraction is 20% or less.  · Local segmental wall motion abnormalities.  · Grade I (mild) left  ventricular diastolic dysfunction consistent with impaired relaxation.  · Normal left atrial pressure.  · Mildly to moderately reduced right ventricular systolic function.  · Mechanically ventilated; cannot use inferior caval vein diameter to estimate central venous pressure.  · IMPELLA well-positioned 4 cm into LV.    Left Ventricle Severe decreased ejection fraction at 20%. Normal left ventricular cavity size. Normal wall thickness observed. Grade I (mild) left ventricular diastolic dysfunction consistent with impaired relaxation. Normal left atrial pressure. Segmental wall motion abnormalities(see wall scoring diagram).   Right Ventricle The ejection fraction is mildly to moderately reduced.   Left Atrium Normal atrial size. .   Right Atrium Normal atrial size.   Aortic Valve An IMPELLA is 4 cm into the LVOT.   Mitral Valve Normal valve structure. No stenosis. No regurgitation. Normal leaflet mobility.   Tricuspid Valve The tricuspid valve is normal. No stenosis. No regurgitation.   Pulmonic Valve Normal valve structure. No stenosis. No regurgitation.   IVC/SVC Mechanically ventilated; cannot use inferior caval vein diameter to estimate central venous pressure.   Ascending Aorta The aortic root and ascending aorta are normal in size.   Pericardium No pericardial effusion. Pericardium is normal.             Mayra Mitchell NP  [facility] Stroke [facility]  Department of Vascular Neurology   [facility]

## 2019-04-02 NOTE — PROGRESS NOTES
[facility]  Cardiac Electrophysiology  Progress Note    Admission Date: 3/29/2019  Code Status: Full Code   Attending Physician: Berlin Calderon MD   Expected Discharge Date: 4/10/2019  Principal Problem:Cardiogenic shock    Subjective:     Interval History: Yesterday, patient was weaned off sedation. Head CT scan was done that showed large area of hypoattenuation in the paramedian right occipital lobe extending into the parietal lobe, compatible with acute or early subacute PCA distribution infarct. The infarct measures up to 7.3 x 3.7 cm in maximal transverse dimension with localized mass effect results in sulcal and ventricular effacement, but there is no significant midline shift and no associated hemorrhage. Vascular Neurology was consulted.     This AM, patient weaned off levophed and vasopressin. Lidocaine drip decreased to 3 mg/min. Amiodarone drip at 0.5 mg/min. Labs notable for drop in platelet count. Patient is grimacing to painful stimuli. Review of his telemetry did not show any VT/VF events. He remains in NSR.     Review of Systems   Unable to perform ROS: intubated     Objective:     Vital Signs (Most Recent):  Temp: 98.9 °F (37.2 °C) (04/02/19 0905)  Pulse: 79 (04/02/19 1000)  Resp: 15 (04/02/19 1000)  BP: (!) 78/71 (03/31/19 1500)  SpO2: 99 % (04/02/19 1000) Vital Signs (24h Range):  Temp:  [98 °F (36.7 °C)-99 °F (37.2 °C)] 98.9 °F (37.2 °C)  Pulse:  [55-80] 79  Resp:  [0-42] 15  SpO2:  [79 %-100 %] 99 %  Arterial Line BP: ()/() 71/64     Weight: 106 kg (233 lb 11 oz)  Body mass index is 34.51 kg/m².     SpO2: 99 %  O2 Device (Oxygen Therapy): ventilator    Physical Exam   HENT:   ETT in place   Cardiovascular: Normal rate and regular rhythm.   VA ECMO circuit in place   Pulmonary/Chest:   Rhonchi throughout lung fields   Abdominal: Soft. He exhibits no distension. There is no tenderness.   Musculoskeletal:   Left AKA    Neurological:   Grimacing to pain    Skin:  Skin is warm.     Significant Labs: All pertinent lab results from the last 24 hours have been reviewed.    Significant Imaging: Echocardiogram:   Transthoracic echo (TTE) complete (Cupid Only):   Results for orders placed or performed during the hospital encounter of 03/29/19   Transthoracic echo (TTE) complete (Cupid Only)   Result Value Ref Range    STJ 2.41 cm    IVS 1.06 0.6 - 1.1 cm    LA size 4.02 cm    Left Atrium Major Axis 3.96 cm    Left Atrium Minor Axis 4.79 cm    LVIDD 5.18 3.5 - 6.0 cm    LVIDS 4.53 (A) 2.1 - 4.0 cm    LVOT diameter 2.03 cm    PW 1.02 0.6 - 1.1 cm    MV Peak A Angel 0.26 m/s    E wave decelartion time 81.67 msec    MV Peak E Angel 0.31 m/s    RA Major Axis 3.43 cm    RA Width 2.04 cm    RVDD 2.47 cm    Sinus 2.98 cm    TAPSE 1.06 cm    LA WIDTH 3.00 cm    LV Diastolic Volume 128.37 mL    LV Systolic Volume 93.99 mL    FS 13 %    LA volume 44.44 cm3    LV mass 203.33 g    Left Ventricle Relative Wall Thickness 0.39 cm    E/A ratio 1.19     LVOT area 3.23 cm2    LV Systolic Volume Index 43.0 mL/m2    LV Diastolic Volume Index 58.67 mL/m2    LA Volume Index 20.3 mL/m2    LV Mass Index 92.9 g/m2    BSA 2.25 m2     Assessment and Plan:     VT (ventricular tachycardia)  -in the setting of transmural infarct with likely cause from fixed cardiac scar tissue from STEMI  -c/w lidocaine drip at 3 mg/min   -c/w amiodarone drip at 0.5 mg/min   -c/w current pacer settings  -monitor electrolytes and replete as needed, goal potassium >4 and magnesium >2  -keep defibrillator pads on patient  -c/w to monitor on telemetry   -EP team will continue to follow along     Caim Delaney MD  Cardiac Electrophysiology  [facility]

## 2019-04-02 NOTE — SUBJECTIVE & OBJECTIVE
Neurologic Chief Complaint: altered mental status    Subjective:     Interval History: Patient is seen for follow-up neurological assessment and treatment recommendations: Neuro exam remains poor- GCS 3, not completely explained by R PCA infarct. Patient currently on ECMO and CRRT. Recommend continuing current antithrombotic regimen.    HPI, Past Medical, Family, and Social History remains the same as documented in the initial encounter.     Review of Systems   Constitutional: Positive for activity change and fatigue.   Eyes: Positive for visual disturbance.   Gastrointestinal: Negative for diarrhea and vomiting.   Genitourinary: Positive for decreased urine volume.   Neurological: Positive for weakness.     Scheduled Meds:   aspirin  81 mg Oral Daily    chlorhexidine  15 mL Mouth/Throat BID    clopidogrel  75 mg Oral Daily    fluconazole (DIFLUCAN) IVPB  400 mg Intravenous Q24H    hydrocortisone sodium succinate  100 mg Intravenous Q8H    mupirocin  1 g Nasal BID    pantoprazole  40 mg Intravenous BID    polyethylene glycol  17 g Oral Daily    vancomycin (VANCOCIN) IVPB  1,000 mg Intravenous Q24H     Continuous Infusions:   amiodarone in dextrose 5% 0.5 mg/min (04/02/19 1400)    dextrose 5 % and 0.9 % NaCl with KCl 20 mEq 5 mL/hr (03/29/19 2119)    epinephrine infusion 0.06 mcg/kg/min (04/02/19 1400)    heparin (porcine) in 5 % dex 1,000 Units/hr (04/02/19 1400)    lidocaine 4 mg/min (04/02/19 1400)    norepinephrine bitartrate-D5W Stopped (04/02/19 0600)    vasopressin (PITRESSIN) infusion Stopped (04/02/19 1300)     PRN Meds:sodium chloride, sodium chloride, acetaminophen, acetaminophen, bisacodyl, dextrose 50%, glucagon (human recombinant), insulin aspart U-100, ondansetron, ondansetron, oxyCODONE, oxyCODONE    Objective:     Vital Signs (Most Recent):  Temp: 99 °F (37.2 °C) (04/02/19 1100)  Pulse: 94 (04/02/19 1500)  Resp: 13 (04/02/19 1330)  BP: (!) 78/71 (03/31/19 1500)  SpO2: 100 % (04/02/19  1215)  BP Location: Right arm    Vital Signs Range (Last 24H):  Temp:  [98 °F (36.7 °C)-99 °F (37.2 °C)]   Pulse:  [0-94]   Resp:  [0-42]   SpO2:  [93 %-100 %]   Arterial Line BP: ()/()   BP Location: Right arm    Physical Exam   Constitutional: He appears ill. He is intubated.   HENT:   Head: Normocephalic and atraumatic.   Right Ear: External ear normal.   Left Ear: External ear normal.   Nose: Nose normal.   Cardiovascular: Normal rate.   Pulmonary/Chest: He is intubated.   Neurological: He is unresponsive. GCS eye subscore is 1. GCS verbal subscore is 1. GCS motor subscore is 1.   Skin: Skin is warm and dry.       Neurological Exam:   LOC: comatose  Attention Span: poor  Language: Intubated  Articulation: Untestable due to intubation  Orientation: Untestable due to intubation  Visual Fields: no BTT bilateral  Pupils (CN II, III): PERRL  Facial Sensation (CN V): Corneal reflex depressed Bilateral  Facial Movement (CN VII): Unable to test   Motor: Arm left  Plegia 0/5  Leg left  Plegia 0/5  Arm right  Plegia 0/5  Leg right Plegia 0/5      Laboratory:  CMP:   Recent Labs   Lab 04/02/19  0920 04/02/19  1054   CALCIUM 10.2 13.6*   ALBUMIN 3.0*  --    PROT 4.5*  --     136   K 4.0  4.0 3.9   CO2 21* 22*    107   BUN 9 9   CREATININE 1.0 1.0   ALKPHOS 179*  --    ALT 1,372*  --    AST 2,375*  --    BILITOT 10.5*  --      BMP:   Recent Labs   Lab 04/02/19  1054      K 3.9      CO2 22*   BUN 9   CREATININE 1.0   CALCIUM 13.6*     CBC:   Recent Labs   Lab 04/02/19  1054  04/02/19  1412   WBC 18.82*  --   --    RBC 3.41*  --   --    HGB 9.7*  --   --    HCT 30.3*   < > 29*   PLT 51*  --   --    MCV 89  --   --    MCH 28.4  --   --    MCHC 32.0  --   --     < > = values in this interval not displayed.     Lipid Panel:   Recent Labs   Lab 03/29/19  0955 04/02/19  0345   CHOL 194  --    LDLCALC 117.8  --    HDL 25*  --    TRIG 256* 160*     Coagulation:   Recent Labs   Lab 04/02/19  0920    INR 1.8*   APTT 63.7*     Platelet Aggregation Study: No results for input(s): PLTAGG, PLTAGINTERP, PLTAGREGLACO, ADPPLTAGGREG in the last 168 hours.  Hgb A1C:   Recent Labs   Lab 03/29/19  0630   HGBA1C 5.3     TSH: No results for input(s): TSH in the last 168 hours.    Diagnostic Results     Brain Imaging     CT head w/o contrast 04/01/19      Large recent right PCA distribution infarct as above.    Mild chronic ischemic change    Vessel Imaging     Cardiac Imaging     TTE 03/29/19  · Severely decreased left ventricular systolic function. The estimated ejection fraction is 20% or less.  · Local segmental wall motion abnormalities.  · Grade I (mild) left ventricular diastolic dysfunction consistent with impaired relaxation.  · Normal left atrial pressure.  · Mildly to moderately reduced right ventricular systolic function.  · Mechanically ventilated; cannot use inferior caval vein diameter to estimate central venous pressure.  · IMPELLA well-positioned 4 cm into LV.    Left Ventricle Severe decreased ejection fraction at 20%. Normal left ventricular cavity size. Normal wall thickness observed. Grade I (mild) left ventricular diastolic dysfunction consistent with impaired relaxation. Normal left atrial pressure. Segmental wall motion abnormalities(see wall scoring diagram).   Right Ventricle The ejection fraction is mildly to moderately reduced.   Left Atrium Normal atrial size. .   Right Atrium Normal atrial size.   Aortic Valve An IMPELLA is 4 cm into the LVOT.   Mitral Valve Normal valve structure. No stenosis. No regurgitation. Normal leaflet mobility.   Tricuspid Valve The tricuspid valve is normal. No stenosis. No regurgitation.   Pulmonic Valve Normal valve structure. No stenosis. No regurgitation.   IVC/SVC Mechanically ventilated; cannot use inferior caval vein diameter to estimate central venous pressure.   Ascending Aorta The aortic root and ascending aorta are normal in size.   Pericardium No  pericardial effusion. Pericardium is normal.

## 2019-04-02 NOTE — PROGRESS NOTES
Patient currently on SLED for removal of uremic toxins and volume control. I personally saw and examined the patient on SLED.  The patient is tolerating the treatment, see SLED flow sheet for vitals and assessemts. I also reviewed the chart and current medication. The dialysis bath was adjusted.    Volume: Neg.: 1.7 liter from yd, will continue his UF as tolerated up to 350-500 cc/hr - patient has a total white out of his lungs and is significantly volume overloaded.   Would reduce UF again if increasing pressor requirements.     Electrolytes: continue 3K and 3.5 calcium bath. Will need to increase K in the bath if his serum K falls below 4.0mg/dl. Will also decrease Ca bath if calcium level increases above 1.45 mmol/l.     Patient's neuro status not favorable: [facility] neurology is following.

## 2019-04-02 NOTE — SUBJECTIVE & OBJECTIVE
Interval History:   CT head showed large stroke   Still intubated on [facility] ECMO  Transaminases improving    Continuous Infusions:   amiodarone in dextrose 5% 0.5 mg/min (04/02/19 1400)    dextrose 5 % and 0.9 % NaCl with KCl 20 mEq 5 mL/hr (03/29/19 2119)    epinephrine infusion 0.06 mcg/kg/min (04/02/19 1400)    heparin (porcine) in 5 % dex 1,000 Units/hr (04/02/19 1400)    lidocaine 4 mg/min (04/02/19 1400)    norepinephrine bitartrate-D5W Stopped (04/02/19 0600)    vasopressin (PITRESSIN) infusion Stopped (04/02/19 1300)     Scheduled Meds:   aspirin  81 mg Oral Daily    chlorhexidine  15 mL Mouth/Throat BID    clopidogrel  75 mg Oral Daily    fluconazole (DIFLUCAN) IVPB  400 mg Intravenous Q24H    hydrocortisone sodium succinate  100 mg Intravenous Q8H    mupirocin  1 g Nasal BID    pantoprazole  40 mg Intravenous BID    polyethylene glycol  17 g Oral Daily    vancomycin (VANCOCIN) IVPB  1,000 mg Intravenous Q24H     PRN Meds:sodium chloride, sodium chloride, acetaminophen, acetaminophen, bisacodyl, dextrose 50%, glucagon (human recombinant), insulin aspart U-100, ondansetron, ondansetron, oxyCODONE, oxyCODONE    Review of patient's allergies indicates:  No Known Allergies  Objective:     Vital Signs (Most Recent):  Temp: 99 °F (37.2 °C) (04/02/19 1100)  Pulse: 94 (04/02/19 1500)  Resp: 13 (04/02/19 1330)  BP: (!) 78/71 (03/31/19 1500)  SpO2: 100 % (04/02/19 1215) Vital Signs (24h Range):  Temp:  [98.2 °F (36.8 °C)-99 °F (37.2 °C)] 99 °F (37.2 °C)  Pulse:  [0-94] 94  Resp:  [0-42] 13  SpO2:  [93 %-100 %] 100 %  Arterial Line BP: ()/() 84/77     Patient Vitals for the past 72 hrs (Last 3 readings):   Weight   03/31/19 0500 106 kg (233 lb 11 oz)     Body mass index is 34.51 kg/m².      Intake/Output Summary (Last 24 hours) at 4/2/2019 1531  Last data filed at 4/2/2019 1400  Gross per 24 hour   Intake 5924.54 ml   Output 75908 ml   Net -4857.46 ml       Physical Exam   Constitutional:    Cool to touch. Intubated and paralyzed   HENT:   Head: Normocephalic and atraumatic.   Cardiovascular:   Impella in place. Patient connected to ECMO circuit    Pulmonary/Chest: No respiratory distress.   Intubated, PEEP minimal vent settings now that on ECMO   Abdominal: He exhibits no distension.   Genitourinary:   Genitourinary Comments: [place] in place   Musculoskeletal:   Paralyzed.   LLE cool to touch. Anterior shin modeled    Skin:   Skin is cool to touch   Psychiatric:   sedated       Significant Labs:  CBC:  Recent Labs   Lab 04/02/19  0001  04/02/19  0340  04/02/19  1054 04/02/19  1059 04/02/19  1412   WBC 15.41*  --  16.16*  --  18.82*  --   --    RBC 3.35*  --  3.36*  --  3.41*  --   --    HGB 9.8*  --  9.6*  --  9.7*  --   --    HCT 29.5*   < > 29.6*   < > 30.3* 30* 29*   PLT 30*  --  29*  --  51*  --   --    MCV 88  --  88  --  89  --   --    MCH 29.3  --  28.6  --  28.4  --   --    MCHC 33.2  --  32.4  --  32.0  --   --     < > = values in this interval not displayed.     BNP:  Recent Labs   Lab 03/29/19  0052   *     CMP:  Recent Labs   Lab 04/01/19  2345 04/02/19  0340 04/02/19  0345 04/02/19  0920 04/02/19  1054   GLU 92 93  93  --  96 116*   CALCIUM 10.2 10.0  10.0  --  10.2 13.6*   ALBUMIN 3.1* 3.0*  3.0*  --  3.0*  --    PROT 4.5* 4.3*  --  4.5*  --     137  137  --  137 136   K 4.5  4.5  4.5 4.1  4.1  4.1 4.1 4.0  4.0 3.9   CO2 22* 21*  21*  --  21* 22*    107  107  --  106 107   BUN 10 9  9  --  9 9   CREATININE 1.1 1.1  1.1  --  1.0 1.0   ALKPHOS 174* 170*  --  179*  --    ALT 1,575* 1,492*  --  1,372*  --    AST 3,230* 2,795*  --  2,375*  --    BILITOT 9.3* 9.8*  --  10.5*  --       Coagulation:   Recent Labs   Lab 04/01/19  2345 04/02/19  0340 04/02/19  0920   INR 1.9* 1.9* 1.8*   APTT 92.8* 76.7* 63.7*     LDH:  Recent Labs   Lab 03/31/19  1300 04/01/19  0319 04/02/19  0345   LDH 13,050* 7,792* 3,206*     Microbiology:  Microbiology Results (last 7 days)      Procedure Component Value Units Date/Time    Blood culture [[id number]]     Order Status:  Canceled Specimen:  Blood     Blood culture [[id number]]     Order Status:  Canceled Specimen:  Blood           I have reviewed all pertinent labs within the past 24 hours.    Estimated Creatinine Clearance: 114.2 mL/min (based on SCr of 1 mg/dL).    Diagnostic Results:  I have reviewed all pertinent imaging results/findings within the past 24 hours.

## 2019-04-02 NOTE — SUBJECTIVE & OBJECTIVE
Interval History: VT yesterday and shocked x 2. Overnight no issues, but this morning developed bradycardia. Followed by EP now. Following their recs. Transfusing plts. Sutured thigh ECMO line in place. Grimacing to pain.      Medications:  Continuous Infusions:   amiodarone in dextrose 5% 0.5 mg/min (04/02/19 1100)    dextrose 5 % and 0.9 % NaCl with KCl 20 mEq 5 mL/hr (03/29/19 2119)    epinephrine infusion 0.06 mcg/kg/min (04/02/19 1100)    heparin (porcine) in 5 % dex 1,000 Units/hr (04/02/19 1100)    lidocaine 4 mg/min (04/02/19 1100)    norepinephrine bitartrate-D5W Stopped (04/02/19 0600)    vasopressin (PITRESSIN) infusion 0.02 Units/min (04/02/19 1130)     Scheduled Meds:   aspirin  81 mg Oral Daily    chlorhexidine  15 mL Mouth/Throat BID    clopidogrel  75 mg Oral Daily    fluconazole (DIFLUCAN) IVPB  400 mg Intravenous Q24H    hydrocortisone sodium succinate  100 mg Intravenous Q8H    magnesium sulfate IVPB  1 g Intravenous Once    mupirocin  1 g Nasal BID    pantoprazole  40 mg Intravenous BID    polyethylene glycol  17 g Oral Daily    vancomycin (VANCOCIN) IVPB  1,000 mg Intravenous Q24H     PRN Meds:sodium chloride, sodium chloride, acetaminophen, acetaminophen, bisacodyl, dextrose 50%, glucagon (human recombinant), insulin aspart U-100, ondansetron, ondansetron, oxyCODONE, oxyCODONE     Objective:     Vital Signs (Most Recent):  Temp: 99 °F (37.2 °C) (04/02/19 1100)  Pulse: 79 (04/02/19 1145)  Resp: 12 (04/02/19 1145)  BP: (!) 78/71 (03/31/19 1500)  SpO2: 100 % (04/02/19 1145) Vital Signs (24h Range):  Temp:  [98 °F (36.7 °C)-99 °F (37.2 °C)] 99 °F (37.2 °C)  Pulse:  [55-80] 79  Resp:  [0-42] 12  SpO2:  [79 %-100 %] 100 %  Arterial Line BP: ()/() 75/65     Weight: 106 kg (233 lb 11 oz)  Body mass index is 34.51 kg/m².    SpO2: 100 %  O2 Device (Oxygen Therapy): ventilator    Intake/Output - Last 3 Shifts       03/31 0700 - 04/01 0659 04/01 0700 - 04/02 0659 04/02 0700 -  04/03 0659    I.V. (mL/kg) 7751 (73.1) 6941.5 (65.5) 661 (6.2)    Blood  530     Other       Total Intake(mL/kg) 7751 (73.1) 7471.5 (70.5) 661 (6.2)    Urine (mL/kg/hr)       Other 90376 9109 2354    Blood 13 9 3    Total Output 19244 9118 2357    Net -2355 -1646.5 -1696                 Lines/Drains/Airways     Central Venous Catheter Line                 ECMO Cannula 03/29/19 1800 right femoral artery 3 days         ECMO Cannula 03/29/19 1800 right internal jugular 3 days         Introducer 03/29/19 2015 left femoral vein 3 days         Percutaneous Central Line Insertion/Assessment - triple lumen  03/29/19 2015 left internal jugular 3 days         Trialysis (Dialysis) Catheter 03/29/19 2015 left internal jugular 3 days          Drain                 NG/OG Tube 03/29/19 0300 Center mouth 4 days          Airway                 Airway - Non-Surgical 03/29/19 0223 Endotracheal Tube 4 days          Arterial Line                 Arterial Line 03/29/19 2015 Right Radial 3 days          Line                 Pacer Wires 03/29/19 2015 3 days         VAD 03/30/19 0000 Right ventricular assist device 3 days          Peripheral Intravenous Line                 Peripheral IV - Single Lumen 03/29/19 0039 Left Antecubital 4 days                Physical Exam   Constitutional:   Intubated.   Off sedation- no appreciated neuro exam   HENT:   Head: Normocephalic and atraumatic.   Cardiovascular:   RRR. Patient connected to ECMO circuit    Pulmonary/Chest: No respiratory distress.   Intubated, PEEP minimal vent settings now that on ECMO   Abdominal: He exhibits no distension.   Genitourinary:   Genitourinary Comments: [place] in place   Musculoskeletal:      L AKA stump intact. Prevena wound vac in place    Skin:   Skin is cool to touch       Significant Labs:  ABGs:   Recent Labs   Lab 04/02/19  1059   PH 7.389   PCO2 42.3   PO2 293*   HCO3 25.6   POCSATURATED 100   BE 1     CBC:   Recent Labs   Lab 04/02/19  1054 04/02/19  1059   WBC  18.82*  --    RBC 3.41*  --    HGB 9.7*  --    HCT 30.3* 30*   PLT 51*  --    MCV 89  --    MCH 28.4  --    MCHC 32.0  --      CMP:   Recent Labs   Lab 04/02/19  0920 04/02/19  1054   GLU 96 116*   CALCIUM 10.2 13.6*   ALBUMIN 3.0*  --    PROT 4.5*  --     136   K 4.0  4.0 3.9   CO2 21* 22*    107   BUN 9 9   CREATININE 1.0 1.0   ALKPHOS 179*  --    ALT 1,372*  --    AST 2,375*  --    BILITOT 10.5*  --      Coagulation:   Recent Labs   Lab 04/02/19 0920   INR 1.8*   APTT 63.7*     Lactic Acid: No results for input(s): LACTATE in the last 48 hours.  All pertinent labs from the last 24 hours have been reviewed.    Significant Diagnostics:  I have reviewed all pertinent imaging results/findings within the past 24 hours. CT head shows large right occipital ischemic stroke.

## 2019-04-02 NOTE — SUBJECTIVE & OBJECTIVE
Interval History: Yesterday, patient was weaned off sedation. Head CT scan was done that showed large area of hypoattenuation in the paramedian right occipital lobe extending into the parietal lobe, compatible with acute or early subacute PCA distribution infarct. The infarct measures up to 7.3 x 3.7 cm in maximal transverse dimension with localized mass effect results in sulcal and ventricular effacement, but there is no significant midline shift and no associated hemorrhage. Vascular Neurology was consulted.     This AM, patient weaned off levophed and vasopressin. Lidocaine drip decreased to 3 mg/min. Amiodarone drip at 0.5 mg/min. Labs notable for drop in platelet count. Patient is grimacing to painful stimuli. Review of his telemetry did not show any VT/VF events. He remains in NSR.     Review of Systems   Unable to perform ROS: intubated     Objective:     Vital Signs (Most Recent):  Temp: 98.9 °F (37.2 °C) (04/02/19 0905)  Pulse: 79 (04/02/19 1000)  Resp: 15 (04/02/19 1000)  BP: (!) 78/71 (03/31/19 1500)  SpO2: 99 % (04/02/19 1000) Vital Signs (24h Range):  Temp:  [98 °F (36.7 °C)-99 °F (37.2 °C)] 98.9 °F (37.2 °C)  Pulse:  [55-80] 79  Resp:  [0-42] 15  SpO2:  [79 %-100 %] 99 %  Arterial Line BP: ()/() 71/64     Weight: 106 kg (233 lb 11 oz)  Body mass index is 34.51 kg/m².     SpO2: 99 %  O2 Device (Oxygen Therapy): ventilator    Physical Exam   HENT:   ETT in place   Cardiovascular: Normal rate and regular rhythm.   VA ECMO circuit in place   Pulmonary/Chest:   Rhonchi throughout lung fields   Abdominal: Soft. He exhibits no distension. There is no tenderness.   Musculoskeletal:   Left AKA    Neurological:   Grimacing to pain    Skin: Skin is warm.     Significant Labs: All pertinent lab results from the last 24 hours have been reviewed.    Significant Imaging: Echocardiogram:   Transthoracic echo (TTE) complete (Cupid Only):   Results for orders placed or performed during the hospital encounter  of 03/29/19   Transthoracic echo (TTE) complete (Cupid Only)   Result Value Ref Range    STJ 2.41 cm    IVS 1.06 0.6 - 1.1 cm    LA size 4.02 cm    Left Atrium Major Axis 3.96 cm    Left Atrium Minor Axis 4.79 cm    LVIDD 5.18 3.5 - 6.0 cm    LVIDS 4.53 (A) 2.1 - 4.0 cm    LVOT diameter 2.03 cm    PW 1.02 0.6 - 1.1 cm    MV Peak A Angel 0.26 m/s    E wave decelartion time 81.67 msec    MV Peak E Angel 0.31 m/s    RA Major Axis 3.43 cm    RA Width 2.04 cm    RVDD 2.47 cm    Sinus 2.98 cm    TAPSE 1.06 cm    LA WIDTH 3.00 cm    LV Diastolic Volume 128.37 mL    LV Systolic Volume 93.99 mL    FS 13 %    LA volume 44.44 cm3    LV mass 203.33 g    Left Ventricle Relative Wall Thickness 0.39 cm    E/A ratio 1.19     LVOT area 3.23 cm2    LV Systolic Volume Index 43.0 mL/m2    LV Diastolic Volume Index 58.67 mL/m2    LA Volume Index 20.3 mL/m2    LV Mass Index 92.9 g/m2    BSA 2.25 m2

## 2019-04-02 NOTE — HOSPITAL COURSE
04/02/19: Neuro exam remains poor- GCS 3, not completely explained by R PCA infarct. Patient currently on ECMO and CRRT. Recommend continuing current antithrombotic regimen.

## 2019-04-02 NOTE — ASSESSMENT & PLAN NOTE
-in the setting of transmural infarct with likely cause from fixed cardiac scar tissue from STEMI  -c/w lidocaine drip at 3 mg/min   -c/w amiodarone drip at 0.5 mg/min   -c/w current pacer settings  -monitor electrolytes and replete as needed, goal potassium >4 and magnesium >2  -keep defibrillator pads on patient  -c/w to monitor on telemetry   -EP team will continue to follow along

## 2019-04-02 NOTE — PROGRESS NOTES
Dr. London at bedside and updated on gtts, rate, lab results, CVP, pt had facial grimacing once while touching face and cleaning mouth. Lita assessed ecmo catheter sites and left aka stump noted to be larger than yesterday, but still warm, soft to touch and no drainage output.

## 2019-04-02 NOTE — SUBJECTIVE & OBJECTIVE
Interval History/Significant Events:   POD3 s/p L AKA; levo and vaso gtts turned off overnight, only remaining on epi at 0.06 currently. Pt exhibiting facial grimacing to oral stimulation- no peripheral withdrawal to pain and pupils remain reactive. Off sedation and paralysis. Transfused 1 unit per CTS this AM     Follow-up For: Procedure(s) (LRB):  LEFT ABOVE KNEE AMPUTATION REMOVAL OF IMPELLA 5.0 (Left)  FASCIOTOMY 3 COMPARTMENT (Left)  REPAIR, ARTERY, FEMORAL  AMPUTATION, ABOVE KNEE    Post-Operative Day: 3 Days Post-Op    Objective:     Vital Signs (Most Recent):  Temp: 98.5 °F (36.9 °C) (04/02/19 0623)  Pulse: 78 (04/02/19 0708)  Resp: (!) 36 (04/02/19 0708)  BP: (!) 78/71 (03/31/19 1500)  SpO2: 100 % (04/02/19 0708) Vital Signs (24h Range):  Temp:  [98 °F (36.7 °C)-99 °F (37.2 °C)] 98.5 °F (36.9 °C)  Pulse:  [0-186] 78  Resp:  [0-38] 36  SpO2:  [79 %-100 %] 100 %  Arterial Line BP: ()/() 85/78     Weight: 106 kg (233 lb 11 oz)  Body mass index is 34.51 kg/m².      Intake/Output Summary (Last 24 hours) at 4/2/2019 0721  Last data filed at 4/2/2019 0700  Gross per 24 hour   Intake 7471.54 ml   Output 9142 ml   Net -1670.46 ml       Physical Exam   Constitutional:   Cool to touch. Intubated; facial grimace with stimulation     HENT:   Head: Normocephalic and atraumatic.   Cardiovascular:   Patient connected to ECMO circuit    Pulmonary/Chest: No respiratory distress.   Intubated, PEEP minimal vent settings now that on ECMO   Abdominal: He exhibits no distension.   Genitourinary:   Genitourinary Comments: [place] in place   Musculoskeletal:   S/p L AKA     Skin:   Skin is cool to touch       Vents:  Vent Mode: A/C (04/02/19 0708)  Ventilator Initiated: Yes (03/29/19 0333)  Set Rate: 10 bmp (04/02/19 0708)  Vt Set: 300 mL (04/02/19 0708)  Pressure Support: 0 cmH20 (03/29/19 1341)  PEEP/CPAP: 8 cmH20 (04/02/19 0708)  Oxygen Concentration (%): 40 (04/02/19 0708)  Peak Airway Pressure: 32 cmH2O (04/02/19  0708)  Plateau Pressure: 27 cmH20 (04/02/19 0708)  Total Ve: 1.7 mL (04/02/19 0708)  F/VT Ratio<105 (RSBI): (P) 734.69 (04/02/19 0708)    Lines/Drains/Airways     Central Venous Catheter Line                 ECMO Cannula 03/29/19 1800 right femoral artery 3 days         ECMO Cannula 03/29/19 1800 right internal jugular 3 days         Introducer 03/29/19 2015 left femoral vein 3 days         Percutaneous Central Line Insertion/Assessment - triple lumen  03/29/19 2015 left internal jugular 3 days         Trialysis (Dialysis) Catheter 03/29/19 2015 left internal jugular 3 days          Drain                 NG/OG Tube 03/29/19 0300 Center mouth 4 days          Airway                 Airway - Non-Surgical 03/29/19 0223 Endotracheal Tube 4 days          Arterial Line                 Arterial Line 03/29/19 2015 Right Radial 3 days          Line                 Pacer Wires 03/29/19 2015 3 days         VAD 03/30/19 0000 Right ventricular assist device 3 days          Peripheral Intravenous Line                 Peripheral IV - Single Lumen 03/29/19 0039 Left Antecubital 4 days                Significant Labs:    CBC/Anemia Profile:  Recent Labs   Lab 04/01/19 1933 04/02/19  0001 04/02/19  0105 04/02/19  0340 04/02/19  0409   WBC 14.51*  --  15.41*  --  16.16*  --    HGB 9.9*  --  9.8*  --  9.6*  --    HCT 29.4*   < > 29.5* 27* 29.6* 27*   PLT 49*  --  30*  --  29*  --    MCV 87  --  88  --  88  --    RDW 15.7*  --  15.7*  --  15.8*  --     < > = values in this interval not displayed.        Chemistries:  Recent Labs   Lab 04/01/19  1933 04/01/19  2137 04/01/19  2345 04/02/19  0340 04/02/19 0345    138 137 137  137  --    K 3.7  3.7 3.8 4.5  4.5  4.5 4.1  4.1  4.1 4.1    106 106 107  107  --    CO2 24 23 22* 21*  21*  --    BUN 10 10 10 9  9  --    CREATININE 1.2 1.1 1.1 1.1  1.1  --    CALCIUM 10.1 10.3 10.2 10.0  10.0  --    ALBUMIN 3.2* 3.1* 3.1* 3.0*  3.0*  --    PROT 4.8*  --  4.5* 4.3*  --     BILITOT 8.9*  --  9.3* 9.8*  --    ALKPHOS 175*  --  174* 170*  --    ALT 1,644*  --  1,575* 1,492*  --    AST 3,574*  --  3,230* 2,795*  --    MG 2.1 1.9 2.0 2.1 1.9   PHOS 2.3* 2.3* 2.3* 2.0*  2.0*  --        Significant Imaging:  I have reviewed all pertinent imaging results/findings within the past 24 hours.

## 2019-04-02 NOTE — PROGRESS NOTES
Pt remains on VA ecmo. RPM 4400 for effective flow 4.8 lpm, sweep 7 lpm, FiO2 100%. Cannula placement confirmed this morning. Pt abg's now Q6 per Dr. Tom.

## 2019-04-02 NOTE — PROGRESS NOTES
[facility]  Heart Transplant  Progress Note    Patient Name: Subhash Ac  MRN: [id number]  Admission Date: 3/29/2019  Hospital Length of Stay: 4 days  Attending Physician: Berlin Calderon MD  Primary Care Provider: Primary Doctor No  Principal Problem:Cardiogenic shock    Subjective:     Interval History:   CT head showed large stroke   Still intubated on VA ECMO  Transaminases improving  Grimacing to oral stimulation    Continuous Infusions:   amiodarone in dextrose 5% 0.5 mg/min (04/02/19 1400)    dextrose 5 % and 0.9 % NaCl with KCl 20 mEq 5 mL/hr (03/29/19 2119)    epinephrine infusion 0.06 mcg/kg/min (04/02/19 1400)    heparin (porcine) in 5 % dex 1,000 Units/hr (04/02/19 1400)    lidocaine 4 mg/min (04/02/19 1400)    norepinephrine bitartrate-D5W Stopped (04/02/19 0600)    vasopressin (PITRESSIN) infusion Stopped (04/02/19 1300)     Scheduled Meds:   aspirin  81 mg Oral Daily    chlorhexidine  15 mL Mouth/Throat BID    clopidogrel  75 mg Oral Daily    fluconazole (DIFLUCAN) IVPB  400 mg Intravenous Q24H    hydrocortisone sodium succinate  100 mg Intravenous Q8H    mupirocin  1 g Nasal BID    pantoprazole  40 mg Intravenous BID    polyethylene glycol  17 g Oral Daily    vancomycin (VANCOCIN) IVPB  1,000 mg Intravenous Q24H     PRN Meds:sodium chloride, sodium chloride, acetaminophen, acetaminophen, bisacodyl, dextrose 50%, glucagon (human recombinant), insulin aspart U-100, ondansetron, ondansetron, oxyCODONE, oxyCODONE    Review of patient's allergies indicates:  No Known Allergies  Objective:     Vital Signs (Most Recent):  Temp: 99 °F (37.2 °C) (04/02/19 1100)  Pulse: 94 (04/02/19 1500)  Resp: 13 (04/02/19 1330)  BP: (!) 78/71 (03/31/19 1500)  SpO2: 100 % (04/02/19 1215) Vital Signs (24h Range):  Temp:  [98.2 °F (36.8 °C)-99 °F (37.2 °C)] 99 °F (37.2 °C)  Pulse:  [0-94] 94  Resp:  [0-42] 13  SpO2:  [93 %-100 %] 100 %  Arterial Line BP: ()/() 84/77      Patient Vitals for the past 72 hrs (Last 3 readings):   Weight   03/31/19 0500 106 kg (233 lb 11 oz)     Body mass index is 34.51 kg/m².      Intake/Output Summary (Last 24 hours) at 4/2/2019 1531  Last data filed at 4/2/2019 1400  Gross per 24 hour   Intake 5924.54 ml   Output 89514 ml   Net -4857.46 ml       Physical Exam   Constitutional:   Cool to touch. Intubated and paralyzed   HENT:   Head: Normocephalic and atraumatic.   Cardiovascular:   Impella in place. Patient connected to ECMO circuit    Pulmonary/Chest: No respiratory distress.   Intubated, PEEP minimal vent settings now that on ECMO   Abdominal: He exhibits no distension.   Genitourinary:   Genitourinary Comments: Trujillo in place   Musculoskeletal:   Paralyzed.   LLE cool to touch. Anterior shin modeled    Skin:   Skin is cool to touch   Psychiatric:   sedated       Significant Labs:  CBC:  Recent Labs   Lab 04/02/19  0001  04/02/19  0340  04/02/19  1054 04/02/19  1059 04/02/19  1412   WBC 15.41*  --  16.16*  --  18.82*  --   --    RBC 3.35*  --  3.36*  --  3.41*  --   --    HGB 9.8*  --  9.6*  --  9.7*  --   --    HCT 29.5*   < > 29.6*   < > 30.3* 30* 29*   PLT 30*  --  29*  --  51*  --   --    MCV 88  --  88  --  89  --   --    MCH 29.3  --  28.6  --  28.4  --   --    MCHC 33.2  --  32.4  --  32.0  --   --     < > = values in this interval not displayed.     BNP:  Recent Labs   Lab 03/29/19  0052   *     CMP:  Recent Labs   Lab 04/01/19  2345 04/02/19  0340 04/02/19  0345 04/02/19  0920 04/02/19  1054   GLU 92 93  93  --  96 116*   CALCIUM 10.2 10.0  10.0  --  10.2 13.6*   ALBUMIN 3.1* 3.0*  3.0*  --  3.0*  --    PROT 4.5* 4.3*  --  4.5*  --     137  137  --  137 136   K 4.5  4.5  4.5 4.1  4.1  4.1 4.1 4.0  4.0 3.9   CO2 22* 21*  21*  --  21* 22*    107  107  --  106 107   BUN 10 9  9  --  9 9   CREATININE 1.1 1.1  1.1  --  1.0 1.0   ALKPHOS 174* 170*  --  179*  --    ALT 1,575* 1,492*  --  1,372*  --    AST 3,230*  2,795*  --  2,375*  --    BILITOT 9.3* 9.8*  --  10.5*  --       Coagulation:   Recent Labs   Lab 04/01/19  2345 04/02/19  0340 04/02/19  0920   INR 1.9* 1.9* 1.8*   APTT 92.8* 76.7* 63.7*     LDH:  Recent Labs   Lab 03/31/19  1300 04/01/19  0319 04/02/19  0345   LDH 13,050* 7,792* 3,206*     Microbiology:  Microbiology Results (last 7 days)     Procedure Component Value Units Date/Time    Blood culture [[id number]]     Order Status:  Canceled Specimen:  Blood     Blood culture [[id number]]     Order Status:  Canceled Specimen:  Blood           I have reviewed all pertinent labs within the past 24 hours.    Estimated Creatinine Clearance: 114.2 mL/min (based on SCr of 1 mg/dL).    Diagnostic Results:  I have reviewed all pertinent imaging results/findings within the past 24 hours.    Assessment and Plan:     43 year old male admitted for code STEMI and taken for [facility] w PCI to LM-LAD, IABP placement and immediate removal followed by placement of Impella CP. He received escalating doses of epinephrine without significant improvement of his cardiogenic shock. By the time of our encounter his SVO2 was 52 w calculated CO of 4.03 / CI 1.8, deteriorating renal function and hemolysis.       * Cardiogenic shock  42 YO M admitted w STEMI underwent PCI to LM/LAD and developed cardiogenic shock, briefly on IABP, followed by Impella CP and high doses of epinephrine. He was quickly deteriorating earlier today with hemolysis and worsening renal dysfunction. CI was just 1.8    We recommended earlier to escalate mechanical support and wean inopressors as tolerated. Since then, he got UE arterial Dopplers to assess Impella 5.0 candidacy but was rejected for that. He went to the cath lab later for TandemHeart.     Multi-organ failure with heart failure  Mr. Doe is a 43yoM admitted w STEMI underwent PCI to LM/LAD and developed cardiogenic shock, briefly on IABP, followed by Impella CP and high doses of epinephrine. Eventually  progressing to multiple organ failure requiring Tandem Heart VAD with VA-ECMO on 03/29//19. Further complicated by developing ALI of the LLE s/p urgent Left AKA with CTS on 03/30/19. Since amputation improvement of his hemodynamic status with adequate ECMO flows ~ 5L and speed 4300. Clearing Lactic acid, LFTs and CPK down trending. Remained intubation, off sedation in the morning to evaluate for neurological recovery.   - CTS primary Dr. Worrell / Lita   - HTS will follow along, please call for any questions or concerns  - Wean off vasopressor support as tolerated   - Recommend continued optimization from his Cardiogenic shock standpoint  - Agree with holding sedation to evaluation neurological status  - Monitor Lactic, CMP, and CPK until normalizes   - Wean off Vent support as tolerated             Kareem Butler MD  Heart Transplant  [facility]

## 2019-04-02 NOTE — PROGRESS NOTES
[facility]  Cardiothoracic Surgery  Progress Note    Patient Name: Subhash Ac  MRN: [id number]  Admission Date: 3/29/2019  Hospital Length of Stay: 4 days  Code Status: Full Code   Attending Physician: Berlin Calderon MD   Referring Provider: Self, Aaareferral  Principal Problem:Cardiogenic shock            Subjective:     Post-Op Info:  Procedure(s) (LRB):  LEFT ABOVE KNEE AMPUTATION REMOVAL OF IMPELLA 5.0 (Left)  FASCIOTOMY 3 COMPARTMENT (Left)  REPAIR, ARTERY, FEMORAL  AMPUTATION, ABOVE KNEE   3 Days Post-Op     Interval History: VT yesterday and shocked x 2. Overnight no issues, but this morning developed bradycardia. Followed by EP now. Following their recs. Transfusing plts. Sutured thigh ECMO line in place. Grimacing to pain.      Medications:  Continuous Infusions:   amiodarone in dextrose 5% 0.5 mg/min (04/02/19 1100)    dextrose 5 % and 0.9 % NaCl with KCl 20 mEq 5 mL/hr (03/29/19 2119)    epinephrine infusion 0.06 mcg/kg/min (04/02/19 1100)    heparin (porcine) in 5 % dex 1,000 Units/hr (04/02/19 1100)    lidocaine 4 mg/min (04/02/19 1100)    norepinephrine bitartrate-D5W Stopped (04/02/19 0600)    vasopressin (PITRESSIN) infusion 0.02 Units/min (04/02/19 1130)     Scheduled Meds:   aspirin  81 mg Oral Daily    chlorhexidine  15 mL Mouth/Throat BID    clopidogrel  75 mg Oral Daily    fluconazole (DIFLUCAN) IVPB  400 mg Intravenous Q24H    hydrocortisone sodium succinate  100 mg Intravenous Q8H    magnesium sulfate IVPB  1 g Intravenous Once    mupirocin  1 g Nasal BID    pantoprazole  40 mg Intravenous BID    polyethylene glycol  17 g Oral Daily    vancomycin (VANCOCIN) IVPB  1,000 mg Intravenous Q24H     PRN Meds:sodium chloride, sodium chloride, acetaminophen, acetaminophen, bisacodyl, dextrose 50%, glucagon (human recombinant), insulin aspart U-100, ondansetron, ondansetron, oxyCODONE, oxyCODONE     Objective:     Vital Signs (Most Recent):  Temp: 99 °F (37.2  °C) (04/02/19 1100)  Pulse: 79 (04/02/19 1145)  Resp: 12 (04/02/19 1145)  BP: (!) 78/71 (03/31/19 1500)  SpO2: 100 % (04/02/19 1145) Vital Signs (24h Range):  Temp:  [98 °F (36.7 °C)-99 °F (37.2 °C)] 99 °F (37.2 °C)  Pulse:  [55-80] 79  Resp:  [0-42] 12  SpO2:  [79 %-100 %] 100 %  Arterial Line BP: ()/() 75/65     Weight: 106 kg (233 lb 11 oz)  Body mass index is 34.51 kg/m².    SpO2: 100 %  O2 Device (Oxygen Therapy): ventilator    Intake/Output - Last 3 Shifts       03/31 0700 - 04/01 0659 04/01 0700 - 04/02 0659 04/02 0700 - 04/03 0659    I.V. (mL/kg) 7751 (73.1) 6941.5 (65.5) 661 (6.2)    Blood  530     Other       Total Intake(mL/kg) 7751 (73.1) 7471.5 (70.5) 661 (6.2)    Urine (mL/kg/hr)       Other 58381 9109 2354    Blood 13 9 3    Total Output 92663 9118 2357    Net -2355 -1646.5 -1696                 Lines/Drains/Airways     Central Venous Catheter Line                 ECMO Cannula 03/29/19 1800 right femoral artery 3 days         ECMO Cannula 03/29/19 1800 right internal jugular 3 days         Introducer 03/29/19 2015 left femoral vein 3 days         Percutaneous Central Line Insertion/Assessment - triple lumen  03/29/19 2015 left internal jugular 3 days         Trialysis (Dialysis) Catheter 03/29/19 2015 left internal jugular 3 days          Drain                 NG/OG Tube 03/29/19 0300 Center mouth 4 days          Airway                 Airway - Non-Surgical 03/29/19 0223 Endotracheal Tube 4 days          Arterial Line                 Arterial Line 03/29/19 2015 Right Radial 3 days          Line                 Pacer Wires 03/29/19 2015 3 days         VAD 03/30/19 0000 Right ventricular assist device 3 days          Peripheral Intravenous Line                 Peripheral IV - Single Lumen 03/29/19 0039 Left Antecubital 4 days                Physical Exam   Constitutional:   Intubated.   Off sedation- no appreciated neuro exam   HENT:   Head: Normocephalic and atraumatic.   Cardiovascular:    RRR. Patient connected to ECMO circuit    Pulmonary/Chest: No respiratory distress.   Intubated, PEEP minimal vent settings now that on ECMO   Abdominal: He exhibits no distension.   Genitourinary:   Genitourinary Comments: Trujillo in place   Musculoskeletal:      L AKA stump intact. Prevena wound vac in place    Skin:   Skin is cool to touch       Significant Labs:  ABGs:   Recent Labs   Lab 04/02/19  1059   PH 7.389   PCO2 42.3   PO2 293*   HCO3 25.6   POCSATURATED 100   BE 1     CBC:   Recent Labs   Lab 04/02/19  1054 04/02/19  1059   WBC 18.82*  --    RBC 3.41*  --    HGB 9.7*  --    HCT 30.3* 30*   PLT 51*  --    MCV 89  --    MCH 28.4  --    MCHC 32.0  --      CMP:   Recent Labs   Lab 04/02/19  0920 04/02/19  1054   GLU 96 116*   CALCIUM 10.2 13.6*   ALBUMIN 3.0*  --    PROT 4.5*  --     136   K 4.0  4.0 3.9   CO2 21* 22*    107   BUN 9 9   CREATININE 1.0 1.0   ALKPHOS 179*  --    ALT 1,372*  --    AST 2,375*  --    BILITOT 10.5*  --      Coagulation:   Recent Labs   Lab 04/02/19  0920   INR 1.8*   APTT 63.7*     Lactic Acid: No results for input(s): LACTATE in the last 48 hours.  All pertinent labs from the last 24 hours have been reviewed.    Significant Diagnostics:  I have reviewed all pertinent imaging results/findings within the past 24 hours. CT head shows large right occipital ischemic stroke.    Assessment/Plan:     * Cardiogenic shock  44yo male with STEMI who is now s/p LM stent and Impella 3.5 placement.  He was loaded with Brilinta.  In my discussion with the interventional cardiology fellow, his MAP was initially in the 50s on Impella support.  It is now in the high 80s and remains there with weaning of norepinephrine.  Would recommend sodium bicarbonate (2-3 amps) for pH of 7.1.  A more normal pH will help his vasopressors function.  In setting of pulmonary edema, would recommend starting nitric oxide support (20-40ppm).  Would recommend central line to check CVP and would recommend  echocardiogram.  Inotropic doses of epinephrine and nitric oxide would support his right heart as well.  For now would continue with aggressive medical management and I think he could get adequate support from the Impella.  Discussed with Dr. Calderon.    ST elevation myocardial infarction (STEMI)  Neuro:   - Intubated   -Sedation off to access neuro exam. Grimacing to pain on face.  - Head CT shows large ischemic right occipital stroke; worry for hemorrhagic conversion given heparin infusion  - Off nimbex   - no pain medications     Pulmonary:   - Intubated  Vent Mode: A/C  Oxygen Concentration (%):  [] 40  Resp Rate Total:  [10 br/min-141 br/min] 16 br/min  Vt Set:  [300 mL] 300 mL  PEEP/CPAP:  [8 cmH20] 8 cmH20  Mean Airway Pressure:  [10 weJ02-51 cmH20] 12 cmH20  - ECMO running- started overnight 3/29  - Daily CXRs while intubated  - ABGs PRN  - BTs, DuoNebs     Cardiac:  - Impella removed in OR 3/30  - Epi, Norepi, Vaso, Lido gtts  - Monitor chest tube output  - Anticoagulation: Heparin @ 1000 this morning, PTT goal 40-70      - bradycardic this am; followed by EP    Renal:    - Anuric on CRRT   - Trend BUN/Cr  - Nephrology following      ID:   - Afebrile   - Ischemic LLE- s/p L AKA on 3/30   - Leukocytosis stable  - lactate steady  - Trend WBC daily  - Ancef, diflucan, vanc     Hem/Onc:   - H/H stable  - 2U plts today for plts 29  - heparin gtt with PTT goal 50-70     Endocrine:    - Insulin gtt for glucose control  - Endocrine consulted, appreciate assistance     Fluids/Electrolytes/Nutrition/GI:   - NPO  - Replace electrolytes PRN  - bicarb bath with crrt   -replaced calcium  -Patient with shock liver- with AST and ALT elevated, improving    PPx:  - DVT: heparin gtt, SCDs     DISPO:  - Continue SICU care. Poor prognosis.                     LEORA Lewis MD  Cardiothoracic Surgery  [facility]

## 2019-04-02 NOTE — PLAN OF CARE
"   04/02/19 1553   Discharge Assessment   Assessment Type Discharge Planning Reassessment   Assessment information obtained from? Caregiver;Other   Discharge Plan B Other  (TBD)   Patient/Family in Agreement with Plan yes     Met with spouse and family in waiting room conference room to discuss questions and concerns from the spouse and family. The patient is recently  to spouse Laura Ac [phone]. Mrs. Ac stated she is from [place] and is not familiar with the process in the [place] for situations of POA. She has questions regarding advanced directives. Explained to the spouse that advanced directive forms that [facility] uses are for a patient that is AAOx4 to complete to designate an appointed HPOA and living will wishes for medical treatment for our records in the event the patient is unable to make medical decisions for themselves. This is not the current situation. I stated that because the patient did not have a designated HPOA all medical decisions defer to the next of kin which would be the patients spouse. I advised Mrs. Ac to seek legal representation to obtain durable power of  to obtain access to financial accounts to pay her spouses debt and bills that are due. She is concerned regarding paying rent. She stated "[employer] has given her 3 days to pay rent" and "will begin to charge $50/day until payment is received." If she is unable to pay they will begin the notice of eviction. Unfortunately, Mrs. Ac does not have legal access to financials in regards to paying rent, bills etc. The  again advised the spouse again to seek immediate legal counseling on how to proceed with obtaining durable POA. Explained that the medicaid application for health care coverage has been accepted and is retro dated to the time of the application. Explained that the disability office has initiated the SSI application as well and it has been flagged as urgent due to " "the patients current health status. The attending will have to sign the application as medically urgent to expedite any SSI benefits for loss of income wages. The spouse wishes for this to be expedited asap so she can focus on "other things." I explained that [facility], beyond filing the application, has no control on how the application is processed by the social security office upon submission for issuing any funds. She needs to discuss this process directly with the social  that will come interview her for SSI. All questions and concerns were answered and the spouse and family members all understand the process going forward. Case mangers contact information given to spouse and family to call with any questions or concerns.     The patients discharge is TBD, he is current intubated, off sedation and withdrawing to oral stimuli, VA ECMO, tandem heart, CRRT, pressor support. Vascular Neurology consulted, Stroke confirmed by imaging. S/p L AKA. STEMI 100% occulusion of LM w/PCI stent placement followed by cardiogenic shock. Now with Multi-organ failure.   "

## 2019-04-02 NOTE — ASSESSMENT & PLAN NOTE
Neuro:   - Intubated   -Sedation off to access neuro exam. Grimacing to pain on face.  - Head CT shows large ischemic right occipital stroke; worry for hemorrhagic conversion given heparin infusion  - Off nimbex   - no pain medications     Pulmonary:   - Intubated  Vent Mode: A/C  Oxygen Concentration (%):  [] 40  Resp Rate Total:  [10 br/min-141 br/min] 16 br/min  Vt Set:  [300 mL] 300 mL  PEEP/CPAP:  [8 cmH20] 8 cmH20  Mean Airway Pressure:  [10 qsN21-76 cmH20] 12 cmH20  - ECMO running- started overnight 3/29  - Daily CXRs while intubated  - ABGs PRN  - BTs, DuoNebs     Cardiac:  - Impella removed in OR 3/30  - Epi, Norepi, Vaso, Lido gtts  - Monitor chest tube output  - Anticoagulation: Heparin @ 1000 this morning, PTT goal 40-70      - bradycardic this am; followed by EP    Renal:    - Anuric on CRRT   - Trend BUN/Cr  - Nephrology following      ID:   - Afebrile   - Ischemic LLE- s/p L AKA on 3/30   - Leukocytosis stable  - lactate steady  - Trend WBC daily  - Ancef, diflucan, vanc     Hem/Onc:   - H/H stable  - 2U plts today for plts 29  - heparin gtt with PTT goal 50-70     Endocrine:    - Insulin gtt for glucose control  - Endocrine consulted, appreciate assistance     Fluids/Electrolytes/Nutrition/GI:   - NPO  - Replace electrolytes PRN  - bicarb bath with crrt   -replaced calcium  -Patient with shock liver- with AST and ALT elevated, improving    PPx:  - DVT: heparin gtt, SCDs     DISPO:  - Continue SICU care. Poor prognosis.

## 2019-04-02 NOTE — PROGRESS NOTES
Dr. London at bedside and updated on labs, gtts, EKG, ABG results. Pt in a bigemmany rhythm, see  EKG for results. New labs sent. Dr. London adjusted pacer settings.
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
## 2019-04-02 NOTE — PLAN OF CARE
Problem: Adult Inpatient Plan of Care  Goal: Plan of Care Review  Outcome: Ongoing (interventions implemented as appropriate)  Pt currently in NSR. MAP goal 60-80. 2 packed platelets. Tube feeding started at 10 cc/hr. CRRT UF @ 450-500, pt tolerating well. Pt remains on ventilator at 40% and 8 PEEP. Pupils are 4mm, equal, brisk. Pulses are obtained by doppler. Right hand is mottled.  Pt is not following commands, withdrawing to pain or moving any extremity. Pt will make facial grimaces to pain or facial stimuli, but not consistently.

## 2019-04-02 NOTE — ASSESSMENT & PLAN NOTE
43 y.o. male s/p STEMI and taken for LHC w PCI to LM-LAD, IABP placement and immediate removal followed by placement of Impella CP. Patient then placed on Tandem heart VAD with ECMO on 3/29.  Patient taken back to OR for left AKA 3/30 with removal of impella    Neuro:   - remains intubated, off sedation and paralytic   - facial grimaces noted with stimulation; no peripheral withdrawal to pain  - CT demonstrated recent right PCA infarct 4/1      Pulmonary:   - Intubated  - ECMO running  - Daily CXRs while intubated  - ABGs PRN  - BTs, DuoNebs    Cardiac:   - 3/28-3/29: code STEMI and taken for LHC w PCI to LM-LAD, IABP placement and immediate removal followed by placement of Impella CP. Continued heart failure then led to placement of Tandem heart VAD with ECMO.  Impella removed.  - Epi, Norepi, Vaso gtt's, currently only on Epi at 0.06  - NO at 10ppm  - Wean pressors per CTS  - Monitor chest tube output  - Anticoagulation per CTS    Renal:    - Anuric on CRRT   - Trend BUN/Cr  - Nephrology on board    ID:   - afebrile   - Leukocytosis 26 postop, today slightly increased to 16  - Trend WBC daily  - post op ancef completed  - continuing on fluconazole  - started on vanc 4/2    Hem/Onc:   - H/H stable  - Transfuse per CTS, receiving 1 unit 4/2 with H/H of 9/29  - plts 29, decreasing  - heparin gtt    Endocrine:    - Insulin gtt for glucose control  - Endocrine consulted, appreciate assistance    Fluids/Electrolytes/Nutrition/GI:   - Replace electrolytes PRN  - mIVF per CTS  - Currently NPO, diet per CTS  - LFTs indicative of shock liver, started on NAC, LFTs trending down    PPx:  - DVT: heparin gtt, SCDs  - PUP:  Protonix    DISPO:  - Continue SICU care  - Goals of care discussion per primary     Primary: CTS

## 2019-04-02 NOTE — ANESTHESIA POSTPROCEDURE EVALUATION
Anesthesia Post Evaluation    Patient: Subhash Ac    Procedure(s) Performed: Procedure(s) (LRB):  LEFT ABOVE KNEE AMPUTATION REMOVAL OF IMPELLA 5.0 (Left)  FASCIOTOMY 3 COMPARTMENT (Left)  REPAIR, ARTERY, FEMORAL  AMPUTATION, ABOVE KNEE    Final Anesthesia Type: general  Patient location during evaluation: ICU  Patient participation: No - Unable to Participate, Intubation  Level of consciousness: sedated  Post-procedure vital signs: reviewed and stable  Pain management: adequate  Airway patency: patent  PONV status at discharge: No PONV  Anesthetic complications: no      Cardiovascular status: hemodynamically stable (on ECMO)  Respiratory status: unassisted  Hydration status: euvolemic  Follow-up not needed.          Vitals Value Taken Time   BP 78/71 3/31/2019  3:00 PM   Temp 37.2 °C (99 °F) 4/2/2019 11:00 AM   Pulse 78 4/2/2019 12:14 PM   Resp 27 4/2/2019 12:14 PM   SpO2 100 % 4/2/2019 12:14 PM   Vitals shown include unvalidated device data.      No case tracking events are documented in the log.      Pain/Genevieve Score: Pain Rating Prior to Med Admin: 0 (4/2/2019 11:00 AM)  Pain Rating Post Med Admin: 0 (4/2/2019 11:00 AM)

## 2019-04-03 PROBLEM — R25.9 ABNORMAL MOVEMENTS: Status: ACTIVE | Noted: 2019-01-01

## 2019-04-03 NOTE — SUBJECTIVE & OBJECTIVE
Interval History:   Low MAPs this morning, increased pump speed to 4500 from 4000  Cont on Lido @ 4  Epi 0.04  amio 0.5  Hep 1200  tBili 13.7    Medications:  Continuous Infusions:   sodium chloride 0.9%      amiodarone in dextrose 5% 0.5 mg/min (04/03/19 0900)    dextrose 5 % and 0.9 % NaCl with KCl 20 mEq 5 mL/hr (03/29/19 2119)    epinephrine infusion 0.04 mcg/kg/min (04/03/19 0900)    heparin (porcine) in 5 % dex 1,200 Units/hr (04/03/19 0900)    lidocaine 3 mg/min (04/03/19 0900)    norepinephrine bitartrate-D5W Stopped (04/02/19 0600)    vasopressin (PITRESSIN) infusion Stopped (04/02/19 1300)     Scheduled Meds:   aspirin  81 mg Oral Daily    chlorhexidine  15 mL Mouth/Throat BID    clopidogrel  75 mg Oral Daily    fluconazole (DIFLUCAN) IVPB  400 mg Intravenous Q24H    hydrocortisone sodium succinate  100 mg Intravenous Q8H    pantoprazole  40 mg Intravenous BID    polyethylene glycol  17 g Oral Daily    vancomycin (VANCOCIN) IVPB  1,000 mg Intravenous Q24H     PRN Meds:sodium chloride, sodium chloride, sodium chloride, sodium chloride, sodium chloride, acetaminophen, acetaminophen, bisacodyl, dextrose 50%, glucagon (human recombinant), insulin aspart U-100, magnesium sulfate IVPB, ondansetron, ondansetron, oxyCODONE, oxyCODONE     Objective:     Vital Signs (Most Recent):  Temp: 98.6 °F (37 °C) (04/03/19 0923)  Pulse: 74 (04/03/19 0923)  Resp: (!) 34 (04/03/19 0923)  BP: (!) 80/68 (04/03/19 0610)  SpO2: 97 % (04/03/19 0923) Vital Signs (24h Range):  Temp:  [98.3 °F (36.8 °C)-99 °F (37.2 °C)] 98.6 °F (37 °C)  Pulse:  [0-126] 74  Resp:  [10-49] 34  SpO2:  [86 %-100 %] 97 %  BP: (68-80)/(58-68) 80/68  Arterial Line BP: (63-94)/(55-89) 82/73     Weight: 106 kg (233 lb 11 oz)  Body mass index is 34.51 kg/m².    SpO2: 97 %  O2 Device (Oxygen Therapy): ventilator    Intake/Output - Last 3 Shifts       04/01 0700 - 04/02 0659 04/02 0700 - 04/03 0659 04/03 0700 - 04/04 0659    I.V. (mL/kg) 6941.5  (65.5) 7317.3 (69)     Blood 530 605 218    NG/GT  170 20    Total Intake(mL/kg) 7471.5 (70.5) 8092.3 (76.3) 238 (2.2)    Other 9109 49110 1447    Blood 9 9 3    Total Output 9118 19308 1450    Net -1646.5 -2788.7 -1212                 Lines/Drains/Airways     Central Venous Catheter Line                 ECMO Cannula 03/29/19 1800 right femoral artery 4 days         ECMO Cannula 03/29/19 1800 right internal jugular 4 days         Introducer 03/29/19 2015 left femoral vein 4 days         Percutaneous Central Line Insertion/Assessment - triple lumen  03/29/19 2015 left internal jugular 4 days         Trialysis (Dialysis) Catheter 03/29/19 2015 left internal jugular 4 days          Drain                 NG/OG Tube 03/29/19 0300 Center mouth 5 days          Airway                 Airway - Non-Surgical 03/29/19 0223 Endotracheal Tube 5 days          Arterial Line                 Arterial Line 03/29/19 2015 Right Radial 4 days          Line                 Pacer Wires 03/29/19 2015 4 days          Peripheral Intravenous Line                 Peripheral IV - Single Lumen 04/02/19 1345 Right Wrist less than 1 day                Physical Exam   Constitutional:   Intubated.   Off sedation- no appreciated neuro exam   HENT:   Head: Normocephalic and atraumatic.   Cardiovascular:   RRR. Patient connected to ECMO circuit    Pulmonary/Chest: No respiratory distress.   Intubated, PEEP minimal vent settings now that on ECMO   Abdominal: He exhibits no distension.   Genitourinary:   Genitourinary Comments: Trujillo in place   Musculoskeletal:      L AKA stump intact. Prevena wound vac in place    Skin:   Skin is cool to touch       Significant Labs:  ABGs:   Recent Labs   Lab 04/03/19  0817   PH 7.450   PCO2 43.7   PO2 421*   HCO3 30.3*   POCSATURATED 100   BE 6     CBC:   Recent Labs   Lab 04/03/19  0324 04/03/19  0807 04/03/19  0816   WBC 18.32* 17.92*  --    RBC 3.20* 3.14*  --    HGB 9.2* 9.2*  --    HCT 27.7* 27.6* 26*   PLT 41*   --   --    MCV 87 88  --    MCH 28.8 29.3  --    MCHC 33.2 33.3  --      CMP:   Recent Labs   Lab 04/03/19  0807      CALCIUM 10.1   ALBUMIN 3.3*   PROT 5.0*   *   K 4.0  4.0   CO2 26      BUN 10   CREATININE 1.1   ALKPHOS 185*   *   AST 1,227*   BILITOT 13.7*     Coagulation:   Recent Labs   Lab 04/03/19  0324 04/03/19  0807   INR 1.7* 1.6*   APTT 49.2*  49.2*  --      Lactic Acid: No results for input(s): LACTATE in the last 48 hours.  All pertinent labs from the last 24 hours have been reviewed.    Significant Diagnostics:  I have reviewed all pertinent imaging results/findings within the past 24 hours. CT head shows large right occipital ischemic stroke.    ROS

## 2019-04-03 NOTE — ASSESSMENT & PLAN NOTE
Neuro:   - Intubated   -Sedation off to access neuro exam. Grimacing to pain on face.  - Head CT shows large ischemic right occipital stroke; worry for hemorrhagic conversion given heparin infusion  - Off nimbex   - no pain medications     Pulmonary:   - Intubated  Vent Mode: A/C  Oxygen Concentration (%):  [40-99] 40  Resp Rate Total:  [10 br/min-35 br/min] 35 br/min  Vt Set:  [300 mL] 300 mL  PEEP/CPAP:  [5 cmH20-8 cmH20] 5 cmH20  Mean Airway Pressure:  [11 cyY36-82 cmH20] 15 cmH20  - ECMO running- started overnight 3/29  - Daily CXRs while intubated  - ABGs PRN  - BTs, DuoNebs     Cardiac:  - Impella removed in OR 3/30  - Epi 0.04   - Wean Lido gtt to off as tolerated  - Increased pump speed to 4500  - Start cardene to keep MAPs <80  - Monitor chest tube output  - Anticoagulation: Heparin @ 1200 this morning, PTT goal 40-70      - bradycardic this am; followed by EP    Renal:    - Anuric on CRRT   - Trend BUN/Cr  - Nephrology following      ID:   - Afebrile   - Ischemic LLE- s/p L AKA on 3/30   - Leukocytosis stable  - lactate steady  - Trend WBC daily  - Ancef, diflucan, vanc     Hem/Onc:   - H/H stable, + 1U today  - Target Plts >50, transfuse PRN   - heparin gtt with PTT goal 50-70     Endocrine:    - Insulin gtt for glucose control  - Endocrine consulted, appreciate assistance     Fluids/Electrolytes/Nutrition/GI:   - NPO  - Replace electrolytes PRN  - bicarb bath with crrt   -replaced calcium  -Patient with shock liver- with AST and ALT elevated but downtrending  - tBili rising, increased ECMO flows to potentially treat congestive hepatopathy     PPx:  - DVT: heparin gtt, SCDs     DISPO:  - Continue SICU care. Poor prognosis.

## 2019-04-03 NOTE — PROCEDURES
[facility]     PRELIMINARY C-EEG REPORT:  Review: 4/3/19, 17:11 (start) - 18:46     Generalized delta (1-3 Hz) slowing seen throughout the period of review.  No epileptiform activity or electrographic seizures seen.    Full report to follow.  Will continue to monitor.     Thank you for involving us in the care of this patient.    Myranda Holland MD, BISHOP(), FACNS, FAES.  Neurology-Epilepsy.  [facility].

## 2019-04-03 NOTE — PROGRESS NOTES
Patient remains on VA ECMO RPM at 4000 with an effective flow of 4.32, sweep at 7lpm, FiO2 100%. ABG with lactate q6. Keeping CO2 between 30-35 PTT 60-80 and flows around 4.5 lpm. Dr. London will be going out of town tomorrow and Dr. Calderon will be covering while he is away.

## 2019-04-03 NOTE — PROGRESS NOTES
PTT resulted 46.8, goal is 50-70. Called and spoke to Dr. Mendosa who advised to go up by 100 units/h on Heparin infusion. Heparin now running at 1100 units/h. Will continue to monitor.

## 2019-04-03 NOTE — ASSESSMENT & PLAN NOTE
43 y.o. male s/p STEMI and taken for [facility] w PCI to LM-LAD, IABP placement and immediate removal followed by placement of Impella CP. Patient then placed on Tandem heart VAD with ECMO on 3/29.  Patient taken back to OR for left AKA 3/30 with removal of impella    Neuro:   - remains intubated, off sedation and paralytic   -  no peripheral withdrawal to pain, not following commands, no longer grimacing  - family discussions per CTS  - CT demonstrated recent right PCA infarct 4/1      Pulmonary:   - Intubated  - ECMO running  - Daily CXRs while intubated  - ABGs PRN  - BTs, DuRakesh    Cardiac:   - 3/28-3/29: code STEMI and taken for [facility] w PCI to LM-LAD, IABP placement and immediate removal followed by placement of Impella CP. Continued heart failure then led to placement of Tandem heart VAD with ECMO.  Impella removed.  - Epi, Norepi, Vaso gtt's, currently only on Epi at 0.04  - Wean pressors per CTS  - Monitor chest tube output  - Anticoagulation per CTS    Renal:    - Anuric on CRRT   - Trend BUN/Cr  - Nephrology on board    ID:   - afebrile   - Leukocytosis 26 postop, today slightly increased to 16  - Trend WBC daily  - post op ancef completed  - continuing on fluconazole  - started on vanc 4/2    Hem/Onc:   - H/H stable  - given 1 unit plts this AM  - heparin gtt    Endocrine:    - Insulin gtt for glucose control  - Endocrine consulted, appreciate assistance    Fluids/Electrolytes/Nutrition/GI:   - Replace electrolytes PRN  - mIVF per CTS  - Currently NPO, diet per CTS  - T bili at 13    PPx:  - DVT: heparin gtt, SCDs  - PUP:  Protonix    DISPO:  - Continue SICU care  - Goals of care discussion per primary     Primary: CTS

## 2019-04-03 NOTE — PROGRESS NOTES
[facility]  Cardiac Electrophysiology  Progress Note    Admission Date: 3/29/2019  Code Status: Full Code   Attending Physician: Berlin Calderon MD   Expected Discharge Date: 4/10/2019  Principal Problem:Cardiogenic shock    Subjective:     Interval History: No overnight events. This AM, primary team started weaning lidocaine drip.     Review of Systems   Unable to perform ROS: acuity of condition     Objective:     Vital Signs (Most Recent):  Temp: 98.6 °F (37 °C) (04/03/19 0923)  Pulse: 75 (04/03/19 1000)  Resp: (!) 34 (04/03/19 1000)  BP: (!) 80/68 (04/03/19 0610)  SpO2: 97 % (04/03/19 1000) Vital Signs (24h Range):  Temp:  [98.3 °F (36.8 °C)-99 °F (37.2 °C)] 98.6 °F (37 °C)  Pulse:  [0-126] 75  Resp:  [10-49] 34  SpO2:  [86 %-100 %] 97 %  BP: (68-80)/(58-68) 80/68  Arterial Line BP: (63-94)/(55-89) 83/74     Weight: 106 kg (233 lb 11 oz)  Body mass index is 34.51 kg/m².     SpO2: 97 %  O2 Device (Oxygen Therapy): ventilator    Physical Exam   HENT:   ETT in place   Cardiovascular: Normal rate and regular rhythm.   VA ECMO circuit in place   Pulmonary/Chest:   Rhonchi throughout lung fields   Abdominal: Soft. He exhibits no distension. There is no tenderness.   Musculoskeletal:   Left AKA    Neurological:   Grimacing to pain    Skin: Skin is warm.       Significant Labs: All pertinent lab results from the last 24 hours have been reviewed.    Significant Imaging: Echocardiogram:   Transthoracic echo (TTE) complete (Cupid Only):   Results for orders placed or performed during the hospital encounter of 03/29/19   Transthoracic echo (TTE) complete (Cupid Only)   Result Value Ref Range    STJ 2.41 cm    IVS 1.06 0.6 - 1.1 cm    LA size 4.02 cm    Left Atrium Major Axis 3.96 cm    Left Atrium Minor Axis 4.79 cm    LVIDD 5.18 3.5 - 6.0 cm    LVIDS 4.53 (A) 2.1 - 4.0 cm    LVOT diameter 2.03 cm    PW 1.02 0.6 - 1.1 cm    MV Peak A Angel 0.26 m/s    E wave decelartion time 81.67 msec    MV Peak E Angel  0.31 m/s    RA Major Axis 3.43 cm    RA Width 2.04 cm    RVDD 2.47 cm    Sinus 2.98 cm    TAPSE 1.06 cm    LA WIDTH 3.00 cm    LV Diastolic Volume 128.37 mL    LV Systolic Volume 93.99 mL    FS 13 %    LA volume 44.44 cm3    LV mass 203.33 g    Left Ventricle Relative Wall Thickness 0.39 cm    E/A ratio 1.19     LVOT area 3.23 cm2    LV Systolic Volume Index 43.0 mL/m2    LV Diastolic Volume Index 58.67 mL/m2    LA Volume Index 20.3 mL/m2    LV Mass Index 92.9 g/m2    BSA 2.25 m2     Assessment and Plan:     VT (ventricular tachycardia)  -in the setting of transmural infarct with likely cause from fixed cardiac scar tissue from STEMI  -can wean off lidocaine drip given concern for neurological symptoms although lidocaine toxicity presents with dysarthria/seizure activity, etc  -c/w amiodarone drip at 0.5 mg/min   -c/w current pacer settings  -monitor electrolytes and replete as needed, goal potassium >4 and magnesium >2  -keep defibrillator pads on patient  -c/w to monitor on telemetry   -EP team will continue to follow along   -overall guarded/poor prognosis     Cami Delaney MD  Cardiac Electrophysiology  [facility]

## 2019-04-03 NOTE — PROGRESS NOTES
[facility]  Critical Care - Surgery  Progress Note    Patient Name: Subhash Ac  MRN: [id number]  Admission Date: 3/29/2019  Hospital Length of Stay: 5 days  Code Status: Full Code  Attending Provider: Berlin Calderon MD  Primary Care Provider: Primary Doctor No   Principal Problem: Cardiogenic shock    Subjective:     Hospital/ICU Course:  Admitted on 3/29/19 for STEMI and taken for [facility] w PCI to LM-LAD, IABP placement and immediate removal followed by placement of Impella CP. Patient then placed on Tandem heart VAD with ECMO. Transferred to SICU for postop care.         Interval History/Significant Events:   POD4 s/p L AKA   Neuro exam remains poor; off sedation and paralysis   No longer grimacing; no withdrawal to pain  Receiving PLTs this Am for plt count of 41  T bili up to 13    Follow-up For: Procedure(s) (LRB):  LEFT ABOVE KNEE AMPUTATION REMOVAL OF IMPELLA 5.0 (Left)  FASCIOTOMY 3 COMPARTMENT (Left)  REPAIR, ARTERY, FEMORAL  AMPUTATION, ABOVE KNEE    Post-Operative Day: 4 Days Post-Op    Objective:     Vital Signs (Most Recent):  Temp: 98.6 °F (37 °C) (04/03/19 0730)  Pulse: 77 (04/03/19 0730)  Resp: (!) 37 (04/03/19 0730)  BP: (!) 80/68 (04/03/19 0610)  SpO2: 99 % (04/03/19 0730) Vital Signs (24h Range):  Temp:  [98.2 °F (36.8 °C)-99 °F (37.2 °C)] 98.6 °F (37 °C)  Pulse:  [0-126] 77  Resp:  [10-49] 37  SpO2:  [86 %-100 %] 99 %  BP: (68-80)/(58-68) 80/68  Arterial Line BP: (63-94)/(55-89) 78/68     Weight: 106 kg (233 lb 11 oz)  Body mass index is 34.51 kg/m².   amiodarone in dextrose 5% 0.5 mg/min (04/03/19 0700)    dextrose 5 % and 0.9 % NaCl with KCl 20 mEq 5 mL/hr (03/29/19 2119)    epinephrine infusion 0.04 mcg/kg/min (04/03/19 0700)    heparin (porcine) in 5 % dex 1,200 Units/hr (04/03/19 0700)    lidocaine 4 mg/min (04/03/19 0700)    norepinephrine bitartrate-D5W Stopped (04/02/19 0600)    vasopressin (PITRESSIN) infusion Stopped (04/02/19 1300)         Intake/Output  Summary (Last 24 hours) at 4/3/2019 0741  Last data filed at 4/3/2019 0730  Gross per 24 hour   Intake 8300.27 ml   Output 65728 ml   Net -2631.73 ml       Physical Exam   Constitutional:   Intubated.   Off sedation- no appreciated neuro exam   HENT:   Head: Normocephalic and atraumatic.   Cardiovascular:   RRR. Patient connected to ECMO circuit    Pulmonary/Chest: No respiratory distress.   Intubated, PEEP minimal vent settings now that on ECMO   Abdominal: He exhibits no distension.   Genitourinary:   Genitourinary Comments: Trujillo in place   Musculoskeletal:      L AKA stump intact. Prevena wound vac in place    Skin:   Skin is cool to touch       Vents:  Vent Mode: A/C (04/03/19 0521)  Ventilator Initiated: Yes (03/29/19 0333)  Set Rate: 10 bmp (04/03/19 0521)  Vt Set: 300 mL (04/03/19 0521)  Pressure Support: 0 cmH20 (03/29/19 1341)  PEEP/CPAP: 8 cmH20 (04/03/19 0521)  Oxygen Concentration (%): 40 (04/03/19 0730)  Peak Airway Pressure: 27 cmH2O (04/03/19 0521)  Plateau Pressure: 27 cmH20 (04/03/19 0521)  Total Ve: 8.68 mL (04/03/19 0521)  F/VT Ratio<105 (RSBI): 113.88 (04/03/19 0521)    Lines/Drains/Airways     Central Venous Catheter Line                 ECMO Cannula 03/29/19 1800 right femoral artery 4 days         ECMO Cannula 03/29/19 1800 right internal jugular 4 days         Introducer 03/29/19 2015 left femoral vein 4 days         Percutaneous Central Line Insertion/Assessment - triple lumen  03/29/19 2015 left internal jugular 4 days         Trialysis (Dialysis) Catheter 03/29/19 2015 left internal jugular 4 days          Drain                 NG/OG Tube 03/29/19 0300 Center mouth 5 days          Airway                 Airway - Non-Surgical 03/29/19 0223 Endotracheal Tube 5 days          Arterial Line                 Arterial Line 03/29/19 2015 Right Radial 4 days          Line                 Pacer Wires 03/29/19 2015 4 days          Peripheral Intravenous Line                 Peripheral IV - Single  Lumen 04/02/19 1345 Right Wrist less than 1 day                Significant Labs:    CBC/Anemia Profile:  Recent Labs   Lab 04/02/19 1921 04/02/19 2300 04/03/19 0156 04/03/19  0324   WBC 19.35*  --  19.15*  --  18.32*   HGB 9.2*  --  9.1*  --  9.2*   HCT 29.2*   < > 28.2* 29* 27.7*   PLT 56*  --  49*  --  41*   MCV 90  --  90  --  87   RDW 15.9*  --  16.2*  --  15.9*    < > = values in this interval not displayed.        Chemistries:  Recent Labs   Lab 04/02/19 1921 04/02/19 2128 04/02/19 2300 04/03/19  0324    136 136 137  137  137   K 3.9  3.9 4.2 3.8  3.8 3.7  3.7  3.7  3.7  3.7    102 102 101  101  101   CO2 24 25 24 24  24  24   BUN 9 9 9 10  10  10   CREATININE 1.0 1.0 1.1 1.0  1.0  1.0   CALCIUM 10.6* 10.4 10.4 10.0  10.0  10.0   ALBUMIN 3.3* 3.2* 3.2* 3.2*  3.2*  3.2*   PROT 4.7*  --  4.6* 4.8*   BILITOT 11.6*  --  12.3* 13.0*   ALKPHOS 165*  --  164* 180*   ALT 1,092*  --  1,010* 1,006*   AST 1,745*  --  1,541* 1,428*   MG 2.3 2.4 2.1 1.9   PHOS 1.6* 1.6* 2.1* 2.5*  2.5*  2.5*       Significant Imaging:  I have reviewed all pertinent imaging results/findings within the past 24 hours.    Assessment/Plan:     ST elevation myocardial infarction (STEMI)  43 y.o. male s/p STEMI and taken for [facility] w PCI to LM-LAD, IABP placement and immediate removal followed by placement of Impella CP. Patient then placed on Tandem heart VAD with ECMO on 3/29.  Patient taken back to OR for left AKA 3/30 with removal of impella    Neuro:   - remains intubated, off sedation and paralytic   -  no peripheral withdrawal to pain, not following commands, no longer grimacing  - family discussions per CTS  - CT demonstrated recent right PCA infarct 4/1      Pulmonary:   - Intubated  - ECMO running  - Daily CXRs while intubated  - ABGs PRN  - BTs, DuRakesh    Cardiac:   - 3/28-3/29: code STEMI and taken for [facility] w PCI to LM-LAD, IABP placement and immediate removal followed by placement of Impella CP.  Continued heart failure then led to placement of Tandem heart VAD with ECMO.  Impella removed.  - Epi, Norepi, Vaso gtt's, currently only on Epi at 0.04  - Wean pressors per CTS  - Monitor chest tube output  - Anticoagulation per CTS    Renal:    - Anuric on CRRT   - Trend BUN/Cr  - Nephrology on board    ID:   - afebrile   - Leukocytosis 26 postop, today slightly increased to 16  - Trend WBC daily  - post op ancef completed  - continuing on fluconazole  - started on vanc 4/2    Hem/Onc:   - H/H stable  - given 1 unit plts this AM  - heparin gtt    Endocrine:    - Insulin gtt for glucose control  - Endocrine consulted, appreciate assistance    Fluids/Electrolytes/Nutrition/GI:   - Replace electrolytes PRN  - mIVF per CTS  - Currently NPO, diet per CTS  - T bili at 13    PPx:  - DVT: heparin gtt, SCDs  - PUP:  Protonix    DISPO:  - Continue SICU care  - Goals of care discussion per primary     Primary: CTS          Critical secondary to Patient has a condition that poses threat to life and bodily function: multi organ system failure      Critical care was time spent personally by me on the following activities: development of treatment plan with patient or surrogate and bedside caregivers, discussions with consultants, evaluation of patient's response to treatment, examination of patient, ordering and performing treatments and interventions, ordering and review of laboratory studies, ordering and review of radiographic studies, pulse oximetry, re-evaluation of patient's condition.  This critical care time did not overlap with that of any other provider or involve time for any procedures.     Belem Segura MD  Critical Care - Surgery  [facility]

## 2019-04-03 NOTE — ASSESSMENT & PLAN NOTE
Abnormal movements concerning for seizures in this critically ill patient. His exam is NOT consistent with brain death, however neurologic prognosis is guarded. He has been off sedation for >48 hours with no change in his exam which is concerning and is a poor prognostic indicator. We had a long, honest conversation with his family and told them that he will never recover to what he was before this admission. The degree of recovery is difficult to determine at this time, but if he does survive we suspect that he would be permanently disabled and required care. The family understands this and all questions and concerns were addressed.    With these movements concerning for seizure, we recommend EEG to evaluate for seizures which could be reversed. We will continue with serial exams, and continue to discuss his prognosis with the family pending EEG results.     Recommendations:  -- EEG >1 hour - ordered and spoke with EEG techs  -- Do not recommend repeat neuroimaging - we feel the risk of moving this patient is not worth it and that EEG is more important  -- avoid sedating medications  -- neuro checks q1h

## 2019-04-03 NOTE — SUBJECTIVE & OBJECTIVE
Interval History:   Patient evaluated at bedside, continues on SLED for clearance and volume assessment. NET negative 2.8 L yesterday     Review of patient's allergies indicates:  No Known Allergies  Current Facility-Administered Medications   Medication Frequency    0.9%  NaCl infusion (CRRT USE ONLY) Continuous    0.9%  NaCl infusion (for blood administration) Q24H PRN    0.9%  NaCl infusion (for blood administration) Q24H PRN    0.9%  NaCl infusion (for blood administration) Q24H PRN    0.9%  NaCl infusion (for blood administration) Q24H PRN    0.9%  NaCl infusion (for blood administration) Q24H PRN    acetaminophen tablet 650 mg Q4H PRN    acetaminophen tablet 650 mg Q6H PRN    amiodarone 360 mg/200 mL (1.8 mg/mL) infusion Continuous    aspirin chewable tablet 81 mg Daily    bisacodyl suppository 10 mg Q12H PRN    chlorhexidine 0.12 % solution 15 mL BID    clopidogrel tablet 75 mg Daily    dextrose 5 % and 0.9 % NaCl with KCl 20 mEq infusion Continuous    dextrose 50% injection 12.5 g PRN    EPINEPHrine (ADRENALIN) 10 mg in sodium chloride 0.9% 250 mL infusion Continuous    fluconazole (DIFLUCAN) IVPB 400 mg 200 mL Q24H    glucagon (human recombinant) injection 1 mg PRN    heparin 25,000 units in dextrose 5% 250 mL (100 units/mL) infusion (heparin infusion) Continuous    hydrocortisone sodium succinate injection 100 mg Q8H    insulin aspart U-100 pen 0-5 Units Q6H PRN    lidocaine 2000 mg in dextrose 5% 250 mL infusion Continuous    magnesium sulfate 2g in water 50mL IVPB (premix) PRN    norepinephrine 16 mg in dextrose 5 % 250 mL infusion Continuous    ondansetron disintegrating tablet 8 mg Q8H PRN    ondansetron injection 4 mg Q12H PRN    oxyCODONE immediate release tablet 5 mg Q4H PRN    oxyCODONE immediate release tablet Tab 10 mg Q4H PRN    pantoprazole injection 40 mg BID    polyethylene glycol packet 17 g Daily    vancomycin in dextrose 5 % 1 gram/250 mL IVPB 1,000 mg Q24H     vasopressin (PITRESSIN) 0.2 Units/mL in dextrose 5 % 100 mL infusion Continuous       Objective:     Vital Signs (Most Recent):  Temp: 98.6 °F (37 °C) (04/03/19 0923)  Pulse: 74 (04/03/19 0923)  Resp: (!) 34 (04/03/19 0923)  BP: (!) 80/68 (04/03/19 0610)  SpO2: 97 % (04/03/19 0923)  O2 Device (Oxygen Therapy): ventilator (04/03/19 0923) Vital Signs (24h Range):  Temp:  [98.3 °F (36.8 °C)-99 °F (37.2 °C)] 98.6 °F (37 °C)  Pulse:  [0-126] 74  Resp:  [10-49] 34  SpO2:  [86 %-100 %] 97 %  BP: (68-80)/(58-68) 80/68  Arterial Line BP: (63-94)/(55-89) 82/73     Weight: 106 kg (233 lb 11 oz) (03/31/19 0500)  Body mass index is 34.51 kg/m².  Body surface area is 2.27 meters squared.    I/O last 3 completed shifts:  In: 16941.8 [I.V.:68044.8; Blood:605; NG/GT:180]  Out: 12166 [Other:81396; Blood:11]    Physical Exam   Constitutional: He appears ill. He is intubated.   HENT:   Head: Normocephalic and atraumatic.   Eyes: Right eye exhibits no discharge. Left eye exhibits no discharge.   Cardiovascular:   RRR. Patient connected to ECMO circuit    Pulmonary/Chest: He is intubated. No respiratory distress.   Abdominal: He exhibits no distension.   Genitourinary:   Genitourinary Comments: Trujillo in place   Musculoskeletal:   Left above knee amputation    Skin: Skin is warm.       Significant Labs:  ABGs:   Recent Labs   Lab 04/03/19  0817   PH 7.450   PCO2 43.7   HCO3 30.3*   POCSATURATED 100   BE 6     BMP:   Recent Labs   Lab 04/03/19  0807         CO2 26   BUN 10   CREATININE 1.1   CALCIUM 10.1   MG 2.3     CBC:   Recent Labs   Lab 04/03/19  0324 04/03/19  0807 04/03/19  0816   WBC 18.32* 17.92*  --    RBC 3.20* 3.14*  --    HGB 9.2* 9.2*  --    HCT 27.7* 27.6* 26*   PLT 41*  --   --    MCV 87 88  --    MCH 28.8 29.3  --    MCHC 33.2 33.3  --      CMP:   Recent Labs   Lab 04/03/19  0807      CALCIUM 10.1   ALBUMIN 3.3*   PROT 5.0*   *   K 4.0  4.0   CO2 26      BUN 10   CREATININE 1.1    ALKPHOS 185*   *   AST 1,227*   BILITOT 13.7*     All labs within the past 24 hours have been reviewed.

## 2019-04-03 NOTE — PROGRESS NOTES
Pt remains on [facility] ECMO, VSS. Pt currently experiencing visible seizure like shaking in trunk and right leg. Mayra Mitchell NP with vascular neurology and Dr. Brijesh valencia CTS notified of patient condition. No new orders at this time. Will continue to monitor pt closely.

## 2019-04-03 NOTE — SUBJECTIVE & OBJECTIVE
Interval History:   Still intubated on [facility] ECMO, off vaso/norepi since yesterday 1 PM  Off propofol for >48h  Unresponsive  Transaminases improving    Continuous Infusions:   sodium chloride 0.9% 200 mL/hr at 04/03/19 1049    amiodarone in dextrose 5% 0.5 mg/min (04/03/19 1400)    dextrose 5 % and 0.9 % NaCl with KCl 20 mEq 5 mL/hr (03/29/19 2119)    epinephrine infusion 0.04 mcg/kg/min (04/03/19 1400)    heparin (porcine) in 5 % dex 1,300 Units/hr (04/03/19 1400)    lidocaine Stopped (04/03/19 1110)    nicardipine 1 mg/hr (04/03/19 1400)     Scheduled Meds:   aspirin  81 mg Oral Daily    calcium chloride IVPB  1 g Intravenous Once    chlorhexidine  15 mL Mouth/Throat BID    clopidogrel  75 mg Oral Daily    fluconazole (DIFLUCAN) IVPB  400 mg Intravenous Q24H    hydrocortisone sodium succinate  100 mg Intravenous Q8H    pantoprazole  40 mg Intravenous BID    polyethylene glycol  17 g Oral Daily    vancomycin (VANCOCIN) IVPB  1,000 mg Intravenous Q24H     PRN Meds:sodium chloride, sodium chloride, sodium chloride, acetaminophen, acetaminophen, bisacodyl, dextrose 50%, glucagon (human recombinant), insulin aspart U-100, magnesium sulfate IVPB, ondansetron, ondansetron, oxyCODONE, oxyCODONE    Review of patient's allergies indicates:  No Known Allergies  Objective:     Vital Signs (Most Recent):  Temp: 98.7 °F (37.1 °C) (04/03/19 1330)  Pulse: 74 (04/03/19 1415)  Resp: (!) 34 (04/03/19 1415)  BP: (!) 80/68 (04/03/19 0610)  SpO2: 100 % (04/03/19 1415) Vital Signs (24h Range):  Temp:  [98.3 °F (36.8 °C)-99.3 °F (37.4 °C)] 98.7 °F (37.1 °C)  Pulse:  [0-126] 74  Resp:  [10-49] 34  SpO2:  [86 %-100 %] 100 %  BP: (68-80)/(58-68) 80/68  Arterial Line BP: (63-94)/(55-89) 79/69     No data found.  Body mass index is 34.51 kg/m².      Intake/Output Summary (Last 24 hours) at 4/3/2019 1457  Last data filed at 4/3/2019 1400  Gross per 24 hour   Intake 85697.27 ml   Output 35996 ml   Net -718.73 ml       Physical  Exam   Constitutional:   Cool to touch. Intubated and paralyzed   HENT:   Head: Normocephalic and atraumatic.   Cardiovascular:   Impella in place. Patient connected to ECMO circuit    Pulmonary/Chest: No respiratory distress.   Intubated, PEEP minimal vent settings now that on ECMO   Abdominal: He exhibits no distension.   Genitourinary:   Genitourinary Comments: Trujillo in place   Musculoskeletal:   Paralyzed.   LLE cool to touch. Anterior shin modeled    Skin:   Skin is cool to touch   Psychiatric:   sedated       Significant Labs:  CBC:  Recent Labs   Lab 04/03/19  0324 04/03/19  0807 04/03/19  0816 04/03/19  1209 04/03/19  1405   WBC 18.32* 17.92*  --  17.49*  --    RBC 3.20* 3.14*  --  3.25*  --    HGB 9.2* 9.2*  --  9.3*  --    HCT 27.7* 27.6* 26* 28.1* 30*   PLT 41* 60*  --  45*  --    MCV 87 88  --  87  --    MCH 28.8 29.3  --  28.6  --    MCHC 33.2 33.3  --  33.1  --      BNP:  Recent Labs   Lab 03/29/19  0052   *     CMP:  Recent Labs   Lab 04/03/19 0324 04/03/19  0807 04/03/19  1209 04/03/19  1407     101  101 103 105 97  97   CALCIUM 10.0  10.0  10.0 10.1 10.1 10.2  10.2   ALBUMIN 3.2*  3.2*  3.2* 3.3* 3.2* 3.2*  3.2*   PROT 4.8* 5.0* 4.8*  --      137  137 134* 135* 135*  135*   K 3.7  3.7  3.7  3.7  3.7 4.0  4.0 4.0  4.0 3.9  3.9   CO2 24  24  24 26 25 26  26     101  101 100 100 100  100   BUN 10  10  10 10 11 11  11   CREATININE 1.0  1.0  1.0 1.1 1.0 1.0  1.0   ALKPHOS 180* 185* 190*  --    ALT 1,006* 916* 881*  --    AST 1,428* 1,227* 1,124*  --    BILITOT 13.0* 13.7* 13.8*  --       Coagulation:   Recent Labs   Lab 04/02/19  2300 04/03/19  0324 04/03/19  0807 04/03/19  1108 04/03/19  1209   INR 1.7* 1.7* 1.6*  --  1.7*   APTT 47.9* 49.2*  49.2*  --  48.1*  --      LDH:  Recent Labs   Lab 04/01/19  0319 04/02/19  0345 04/03/19  0459   LDH 7,792* 3,206* 2,380*     Microbiology:  Microbiology Results (last 7 days)     Procedure Component Value  Units Date/Time    Blood culture [[id number]]     Order Status:  Canceled Specimen:  Blood     Blood culture [[id number]]     Order Status:  Canceled Specimen:  Blood           I have reviewed all pertinent labs within the past 24 hours.    Estimated Creatinine Clearance: 114.2 mL/min (based on SCr of 1 mg/dL).    Diagnostic Results:  I have reviewed all pertinent imaging results/findings within the past 24 hours.

## 2019-04-03 NOTE — ASSESSMENT & PLAN NOTE
Anuric JACOBY 2/2 Ischemic ATN from Cardiogenic Shock    42 yo male presenting to hospital with chief complaint of SOB/CP found to have STEMI.  Taken urgently to cathlab, found to have 100% occlusion to LM, now s/p PCI with RAQUEL.  Impella was placed for cardiogenic shock, noted to be in biventricular failure.  CTS/IC evaluation for possible Tandem placement.    Plan/Recommendations:  - Will continue with SLED for clearance and volume assistance, -500 cc/hr as tolerated by patient maintain MAP> 65. Bath adjusted to chem.   - Continues on mechanical ventilation with FiO2 40%  - Has been NET negative 2.8 L yesterday   - Continues on Epinephrine for blood pressures (off levophed)  - Continues on ECMO  - Will follow closely  - Strict I/Os and chart
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
## 2019-04-03 NOTE — SUBJECTIVE & OBJECTIVE
Interval History/Significant Events:   POD4 s/p L AKA   Neuro exam remains poor; off sedation and paralysis   No longer grimacing; no withdrawal to pain  Receiving PLTs this Am for plt count of 41  T bili up to 13    Follow-up For: Procedure(s) (LRB):  LEFT ABOVE KNEE AMPUTATION REMOVAL OF IMPELLA 5.0 (Left)  FASCIOTOMY 3 COMPARTMENT (Left)  REPAIR, ARTERY, FEMORAL  AMPUTATION, ABOVE KNEE    Post-Operative Day: 4 Days Post-Op    Objective:     Vital Signs (Most Recent):  Temp: 98.6 °F (37 °C) (04/03/19 0730)  Pulse: 77 (04/03/19 0730)  Resp: (!) 37 (04/03/19 0730)  BP: (!) 80/68 (04/03/19 0610)  SpO2: 99 % (04/03/19 0730) Vital Signs (24h Range):  Temp:  [98.2 °F (36.8 °C)-99 °F (37.2 °C)] 98.6 °F (37 °C)  Pulse:  [0-126] 77  Resp:  [10-49] 37  SpO2:  [86 %-100 %] 99 %  BP: (68-80)/(58-68) 80/68  Arterial Line BP: (63-94)/(55-89) 78/68     Weight: 106 kg (233 lb 11 oz)  Body mass index is 34.51 kg/m².   amiodarone in dextrose 5% 0.5 mg/min (04/03/19 0700)    dextrose 5 % and 0.9 % NaCl with KCl 20 mEq 5 mL/hr (03/29/19 2119)    epinephrine infusion 0.04 mcg/kg/min (04/03/19 0700)    heparin (porcine) in 5 % dex 1,200 Units/hr (04/03/19 0700)    lidocaine 4 mg/min (04/03/19 0700)    norepinephrine bitartrate-D5W Stopped (04/02/19 0600)    vasopressin (PITRESSIN) infusion Stopped (04/02/19 1300)         Intake/Output Summary (Last 24 hours) at 4/3/2019 0741  Last data filed at 4/3/2019 0730  Gross per 24 hour   Intake 8300.27 ml   Output 21707 ml   Net -2631.73 ml       Physical Exam   Constitutional:   Intubated.   Off sedation- no appreciated neuro exam   HENT:   Head: Normocephalic and atraumatic.   Cardiovascular:   RRR. Patient connected to ECMO circuit    Pulmonary/Chest: No respiratory distress.   Intubated, PEEP minimal vent settings now that on ECMO   Abdominal: He exhibits no distension.   Genitourinary:   Genitourinary Comments: Truj[place] in place   Musculoskeletal:      L AKA stump intact. Prevena wound  vac in place    Skin:   Skin is cool to touch       Vents:  Vent Mode: A/C (04/03/19 0521)  Ventilator Initiated: Yes (03/29/19 0333)  Set Rate: 10 bmp (04/03/19 0521)  Vt Set: 300 mL (04/03/19 0521)  Pressure Support: 0 cmH20 (03/29/19 1341)  PEEP/CPAP: 8 cmH20 (04/03/19 0521)  Oxygen Concentration (%): 40 (04/03/19 0730)  Peak Airway Pressure: 27 cmH2O (04/03/19 0521)  Plateau Pressure: 27 cmH20 (04/03/19 0521)  Total Ve: 8.68 mL (04/03/19 0521)  F/VT Ratio<105 (RSBI): 113.88 (04/03/19 0521)    Lines/Drains/Airways     Central Venous Catheter Line                 ECMO Cannula 03/29/19 1800 right femoral artery 4 days         ECMO Cannula 03/29/19 1800 right internal jugular 4 days         Introducer 03/29/19 2015 left femoral vein 4 days         Percutaneous Central Line Insertion/Assessment - triple lumen  03/29/19 2015 left internal jugular 4 days         Trialysis (Dialysis) Catheter 03/29/19 2015 left internal jugular 4 days          Drain                 NG/OG Tube 03/29/19 0300 Center mouth 5 days          Airway                 Airway - Non-Surgical 03/29/19 0223 Endotracheal Tube 5 days          Arterial Line                 Arterial Line 03/29/19 2015 Right Radial 4 days          Line                 Pacer Wires 03/29/19 2015 4 days          Peripheral Intravenous Line                 Peripheral IV - Single Lumen 04/02/19 1345 Right Wrist less than 1 day                Significant Labs:    CBC/Anemia Profile:  Recent Labs   Lab 04/02/19  1921 04/02/19  2300 04/03/19  0156 04/03/19  0324   WBC 19.35*  --  19.15*  --  18.32*   HGB 9.2*  --  9.1*  --  9.2*   HCT 29.2*   < > 28.2* 29* 27.7*   PLT 56*  --  49*  --  41*   MCV 90  --  90  --  87   RDW 15.9*  --  16.2*  --  15.9*    < > = values in this interval not displayed.        Chemistries:  Recent Labs   Lab 04/02/19 1921 04/02/19  2128 04/02/19  2300 04/03/19  0324    136 136 137  137  137   K 3.9  3.9 4.2 3.8  3.8 3.7  3.7  3.7  3.7  3.7     102 102 101  101  101   CO2 24 25 24 24  24  24   BUN 9 9 9 10  10  10   CREATININE 1.0 1.0 1.1 1.0  1.0  1.0   CALCIUM 10.6* 10.4 10.4 10.0  10.0  10.0   ALBUMIN 3.3* 3.2* 3.2* 3.2*  3.2*  3.2*   PROT 4.7*  --  4.6* 4.8*   BILITOT 11.6*  --  12.3* 13.0*   ALKPHOS 165*  --  164* 180*   ALT 1,092*  --  1,010* 1,006*   AST 1,745*  --  1,541* 1,428*   MG 2.3 2.4 2.1 1.9   PHOS 1.6* 1.6* 2.1* 2.5*  2.5*  2.5*       Significant Imaging:  I have reviewed all pertinent imaging results/findings within the past 24 hours.

## 2019-04-03 NOTE — PROGRESS NOTES
[facility]  Heart Transplant  Progress Note    Patient Name: Subhash Ac  MRN: [id number]  Admission Date: 3/29/2019  Hospital Length of Stay: 5 days  Attending Physician: Berlin Calderon MD  Primary Care Provider: Primary Doctor No  Principal Problem:Cardiogenic shock    Subjective:     Interval History:   Still intubated on VA ECMO, off vaso/norepi since yesterday 1 PM  Off propofol for >48h  Unresponsive  Transaminases improving    Continuous Infusions:   sodium chloride 0.9% 200 mL/hr at 04/03/19 1049    amiodarone in dextrose 5% 0.5 mg/min (04/03/19 1400)    dextrose 5 % and 0.9 % NaCl with KCl 20 mEq 5 mL/hr (03/29/19 2119)    epinephrine infusion 0.04 mcg/kg/min (04/03/19 1400)    heparin (porcine) in 5 % dex 1,300 Units/hr (04/03/19 1400)    lidocaine Stopped (04/03/19 1110)    nicardipine 1 mg/hr (04/03/19 1400)     Scheduled Meds:   aspirin  81 mg Oral Daily    calcium chloride IVPB  1 g Intravenous Once    chlorhexidine  15 mL Mouth/Throat BID    clopidogrel  75 mg Oral Daily    fluconazole (DIFLUCAN) IVPB  400 mg Intravenous Q24H    hydrocortisone sodium succinate  100 mg Intravenous Q8H    pantoprazole  40 mg Intravenous BID    polyethylene glycol  17 g Oral Daily    vancomycin (VANCOCIN) IVPB  1,000 mg Intravenous Q24H     PRN Meds:sodium chloride, sodium chloride, sodium chloride, acetaminophen, acetaminophen, bisacodyl, dextrose 50%, glucagon (human recombinant), insulin aspart U-100, magnesium sulfate IVPB, ondansetron, ondansetron, oxyCODONE, oxyCODONE    Review of patient's allergies indicates:  No Known Allergies  Objective:     Vital Signs (Most Recent):  Temp: 98.7 °F (37.1 °C) (04/03/19 1330)  Pulse: 74 (04/03/19 1415)  Resp: (!) 34 (04/03/19 1415)  BP: (!) 80/68 (04/03/19 0610)  SpO2: 100 % (04/03/19 1415) Vital Signs (24h Range):  Temp:  [98.3 °F (36.8 °C)-99.3 °F (37.4 °C)] 98.7 °F (37.1 °C)  Pulse:  [0-126] 74  Resp:  [10-49] 34  SpO2:  [86 %-100 %]  100 %  BP: (68-80)/(58-68) 80/68  Arterial Line BP: (63-94)/(55-89) 79/69     No data found.  Body mass index is 34.51 kg/m².      Intake/Output Summary (Last 24 hours) at 4/3/2019 1457  Last data filed at 4/3/2019 1400  Gross per 24 hour   Intake 38367.27 ml   Output 20087 ml   Net -718.73 ml       Physical Exam   Constitutional:   Cool to touch. Intubated and paralyzed   HENT:   Head: Normocephalic and atraumatic.   Cardiovascular:   Impella in place. Patient connected to ECMO circuit    Pulmonary/Chest: No respiratory distress.   Intubated, PEEP minimal vent settings now that on ECMO   Abdominal: He exhibits no distension.   Genitourinary:   Genitourinary Comments: Trujillo in place   Musculoskeletal:   Paralyzed.   LLE cool to touch. Anterior shin modeled    Skin:   Skin is cool to touch   Psychiatric:   sedated       Significant Labs:  CBC:  Recent Labs   Lab 04/03/19 0324 04/03/19  0807 04/03/19  0816 04/03/19  1209 04/03/19  1405   WBC 18.32* 17.92*  --  17.49*  --    RBC 3.20* 3.14*  --  3.25*  --    HGB 9.2* 9.2*  --  9.3*  --    HCT 27.7* 27.6* 26* 28.1* 30*   PLT 41* 60*  --  45*  --    MCV 87 88  --  87  --    MCH 28.8 29.3  --  28.6  --    MCHC 33.2 33.3  --  33.1  --      BNP:  Recent Labs   Lab 03/29/19  0052   *     CMP:  Recent Labs   Lab 04/03/19 0324 04/03/19  0807 04/03/19  1209 04/03/19  1407     101  101 103 105 97  97   CALCIUM 10.0  10.0  10.0 10.1 10.1 10.2  10.2   ALBUMIN 3.2*  3.2*  3.2* 3.3* 3.2* 3.2*  3.2*   PROT 4.8* 5.0* 4.8*  --      137  137 134* 135* 135*  135*   K 3.7  3.7  3.7  3.7  3.7 4.0  4.0 4.0  4.0 3.9  3.9   CO2 24  24  24 26 25 26  26     101  101 100 100 100  100   BUN 10  10  10 10 11 11  11   CREATININE 1.0  1.0  1.0 1.1 1.0 1.0  1.0   ALKPHOS 180* 185* 190*  --    ALT 1,006* 916* 881*  --    AST 1,428* 1,227* 1,124*  --    BILITOT 13.0* 13.7* 13.8*  --       Coagulation:   Recent Labs   Lab 04/02/19  4331  04/03/19  0324 04/03/19  0807 04/03/19  1108 04/03/19  1209   INR 1.7* 1.7* 1.6*  --  1.7*   APTT 47.9* 49.2*  49.2*  --  48.1*  --      LDH:  Recent Labs   Lab 04/01/19  0319 04/02/19  0345 04/03/19  0459   LDH 7,792* 3,206* 2,380*     Microbiology:  Microbiology Results (last 7 days)     Procedure Component Value Units Date/Time    Blood culture [[id number]]     Order Status:  Canceled Specimen:  Blood     Blood culture [[id number]]     Order Status:  Canceled Specimen:  Blood           I have reviewed all pertinent labs within the past 24 hours.    Estimated Creatinine Clearance: 114.2 mL/min (based on SCr of 1 mg/dL).    Diagnostic Results:  I have reviewed all pertinent imaging results/findings within the past 24 hours.    Assessment and Plan:     43 year old male admitted for code STEMI and taken for [facility] w PCI to LM-LAD, IABP placement and immediate removal followed by placement of Impella CP. He received escalating doses of epinephrine without significant improvement of his cardiogenic shock. By the time of our encounter his SVO2 was 52 w calculated CO of 4.03 / CI 1.8, deteriorating renal function and hemolysis.       * Cardiogenic shock  42 YO M admitted w STEMI underwent PCI to LM/LAD and developed cardiogenic shock, briefly on IABP, followed by Impella CP and high doses of epinephrine. He was quickly deteriorating earlier today with hemolysis and worsening renal dysfunction. CI was just 1.8    We recommended earlier to escalate mechanical support and wean inopressors as tolerated. Since then, he got UE arterial Dopplers to assess Impella 5.0 candidacy but was rejected for that. He went to the cath lab later for TandemHeart.     Multi-organ failure with heart failure  Mr. Doe is a 43yoM admitted w STEMI underwent PCI to LM/LAD and developed cardiogenic shock, briefly on IABP, followed by Impella CP and high doses of epinephrine. Eventually progressing to multiple organ failure requiring Tandem Heart  VAD with VA-ECMO on 03/29//19. Further complicated by developing ALI of the LLE s/p urgent Left AKA with CTS on 03/30/19. Since amputation improvement of his hemodynamic status with adequate ECMO flows ~ 5L and speed 4300. Clearing Lactic acid, LFTs and CPK down trending. Remained intubation, off sedation in the morning to evaluate for neurological recovery.   - CTS primary Dr. Worrell / L[place]   - HTS will follow along, please call for any questions or concerns  - Wean off vasopressor support as tolerated   - Recommend continued optimization from his Cardiogenic shock standpoint  - Agree with holding sedation to evaluation neurological status  - Monitor Lactic, CMP, and CPK until normalizes   - Wean off Vent support as tolerated             Kareem Butler MD  Heart Transplant  [facility]

## 2019-04-03 NOTE — SUBJECTIVE & OBJECTIVE
Interval History: No overnight events. This AM, primary team started weaning lidocaine drip.     Review of Systems   Unable to perform ROS: acuity of condition     Objective:     Vital Signs (Most Recent):  Temp: 98.6 °F (37 °C) (04/03/19 0923)  Pulse: 75 (04/03/19 1000)  Resp: (!) 34 (04/03/19 1000)  BP: (!) 80/68 (04/03/19 0610)  SpO2: 97 % (04/03/19 1000) Vital Signs (24h Range):  Temp:  [98.3 °F (36.8 °C)-99 °F (37.2 °C)] 98.6 °F (37 °C)  Pulse:  [0-126] 75  Resp:  [10-49] 34  SpO2:  [86 %-100 %] 97 %  BP: (68-80)/(58-68) 80/68  Arterial Line BP: (63-94)/(55-89) 83/74     Weight: 106 kg (233 lb 11 oz)  Body mass index is 34.51 kg/m².     SpO2: 97 %  O2 Device (Oxygen Therapy): ventilator    Physical Exam   HENT:   ETT in place   Cardiovascular: Normal rate and regular rhythm.   VA ECMO circuit in place   Pulmonary/Chest:   Rhonchi throughout lung fields   Abdominal: Soft. He exhibits no distension. There is no tenderness.   Musculoskeletal:   Left AKA    Neurological:   Grimacing to pain    Skin: Skin is warm.       Significant Labs: All pertinent lab results from the last 24 hours have been reviewed.    Significant Imaging: Echocardiogram:   Transthoracic echo (TTE) complete (Cupid Only):   Results for orders placed or performed during the hospital encounter of 03/29/19   Transthoracic echo (TTE) complete (Cupid Only)   Result Value Ref Range    STJ 2.41 cm    IVS 1.06 0.6 - 1.1 cm    LA size 4.02 cm    Left Atrium Major Axis 3.96 cm    Left Atrium Minor Axis 4.79 cm    LVIDD 5.18 3.5 - 6.0 cm    LVIDS 4.53 (A) 2.1 - 4.0 cm    LVOT diameter 2.03 cm    PW 1.02 0.6 - 1.1 cm    MV Peak A Angel 0.26 m/s    E wave decelartion time 81.67 msec    MV Peak E Angel 0.31 m/s    RA Major Axis 3.43 cm    RA Width 2.04 cm    RVDD 2.47 cm    Sinus 2.98 cm    TAPSE 1.06 cm    LA WIDTH 3.00 cm    LV Diastolic Volume 128.37 mL    LV Systolic Volume 93.99 mL    FS 13 %    LA volume 44.44 cm3    LV mass 203.33 g    Left Ventricle  Relative Wall Thickness 0.39 cm    E/A ratio 1.19     LVOT area 3.23 cm2    LV Systolic Volume Index 43.0 mL/m2    LV Diastolic Volume Index 58.67 mL/m2    LA Volume Index 20.3 mL/m2    LV Mass Index 92.9 g/m2    BSA 2.25 m2

## 2019-04-03 NOTE — PLAN OF CARE
Problem: Adult Inpatient Plan of Care  Goal: Plan of Care Review  Outcome: Ongoing (interventions implemented as appropriate)  VSS throughout shift. Pt experienced mild HTN with MAP between 80-85. Started cardene @ 2.5mg/hr and titrated per response. No problems with pt CRRT, UF @ 500. 1 unit of RBC given, 1 unit of platelets given. Lidocaine off. Heparin @ 1300. Amio @ 0.5, Epi @ 0.04. Pt experienced visible seizure like shaking intermittently throughout shift. Neurology consulted, examined pt, and spoke with family. Order placed for 24 hr EEG. Labs monitored throughout day, replacements given. Plan of care reviewed with pt and family, will continue to monitor pt closely.

## 2019-04-03 NOTE — PROGRESS NOTES
"[facility]  Adult Nutrition  Progress Note    SUMMARY       Recommendations  Recommendation/Intervention:   If medically able, recommend increasing TF to Peptamen Intense VHP at a goal rate of 60 mL/hr - to provide 1440 kcal/day, 132g protein/day, and 1210mL free fluid/day.   RD to monitor.    Goals: Pt to receive nutrition by RD follow up  Nutrition Goal Status: goal not met  Communication of RD Recs: discussed on rounds    Reason for Assessment  Reason For Assessment: RD follow-up  Diagnosis: (cardiogenic shock)  Relevant Medical History: dysarthria, short term memory loss  Interdisciplinary Rounds: attended  General Information Comments: Patient admitted for NSTEMI, taken for LHC and IABP placement 3/29, immediate removal followed by Impella placement. Started VA ECMO overnight. Emergently taken to OR for L AKA, Impella removal, and Tandem placement 3/30. Still intubated, sedated. On CRRT. TF started yesterday but held. (Physical exam remains unchanged, continues to appear nourished with no physical signs of malnutrition at this time.)  Nutrition Discharge Planning: unable to determine at this time    Nutrition Risk Screen  Nutrition Risk Screen: tube feeding or parenteral nutrition    Nutrition/Diet History  Spiritual, Cultural Beliefs, Mormon Practices, Values that Affect Care: (GENESIS at this time)  Factors Affecting Nutritional Intake: NPO, on mechanical ventilation    Anthropometrics  Temp: 98.7 °F (37.1 °C)  Height: 5' 9" (175.3 cm)(reviewed)  Height (inches): 69 in  Weight Method: Bed Scale  Weight: 106 kg (233 lb 11 oz)  Weight (lb): 233.69 lb  Ideal Body Weight (IBW), Male: 160 lb  % Ideal Body Weight, Male (lb): 146.06 lb  BMI (Calculated): 34.6  BMI Grade: 30 - 34.9- obesity - grade I  Amputation %: 16  Total Amputation %: 16  Amputation Ideal Body Weight (IBW), Male (lb): 144 lb  Amputee BMI (kg/m2): 41.2 kg/m2    Lab/Procedures/Meds  Pertinent Labs Reviewed: reviewed  Pertinent " Labs Comments: Na 134, Alb 3.3, T. bili 13.7  Pertinent Medications Reviewed: reviewed  Pertinent Medications Comments: pantoprazole, amiodarone, epinephrine, levophed, vasopressin    Estimated/Assessed Needs  Weight Used For Calorie Calculations: 104.3 kg (229 lb 15 oz)(dosing weight)  Energy Calorie Requirements (kcal): 4214-5454 kcal/day  Energy Need Method: Kcal/kg(11-14 kcal/kg)  Protein Requirements: 109-146 g/day(1.5-2.0 g/kg)  Weight Used For Protein Calculations: 72.7 kg (160 lb 4.4 oz)(IBW)  Fluid Requirements (mL): 1mL/kcal or per MD  Estimated Fluid Requirement Method: RDA Method  RDA Method (mL): 1147    Nutrition Prescription Ordered  Current Diet Order: NPO  Current Nutrition Support Formula Ordered: Novasource Renal  Current Nutrition Support Rate Ordered: 10 (ml)  Current Nutrition Support Frequency Ordered: mL/hr    Evaluation of Received Nutrient/Fluid Intake  I/O: -2.8L x 24hrs, +4.6L since admit  Comments: No BM recorded  % Intake of Estimated Energy Needs: 0 - 25 %  % Meal Intake: NPO    Nutrition Risk  Level of Risk/Frequency of Follow-up: high(2x/week)     Assessment and Plan  Nutrition Problem  Inadequate energy intake    Related to (etiology):   Decreased ability to consume sufficient energy    Signs and Symptoms (as evidenced by):   NPO with no alternative means of nutrition at this time    Interventions/Recommendations (treatment strategy):  Collaboration and referral of nutrition care    Nutrition Diagnosis Status:   Continues    Monitor and Evaluation  Food and Nutrient Intake: energy intake, enteral nutrition intake  Food and Nutrient Adminstration: enteral and parenteral nutrition administration  Anthropometric Measurements: weight, weight change, body mass index  Biochemical Data, Medical Tests and Procedures: gastrointestinal profile, glucose/endocrine profile, electrolyte and renal panel, inflammatory profile, lipid profile  Nutrition-Focused Physical Findings: overall appearance      Nutrition Follow-Up  RD Follow-up?: Yes

## 2019-04-03 NOTE — PROGRESS NOTES
Pt. Remains on VA Ecmo. Pt. Cannulated in the FRA with a 17 fr. Arterial cannula and RIJ with a 31 fr. Venous cannula. Cannula placement measured and confirmed.  Increased RPMs to 4500 this shift per Dr. Virk. Current flow is 4.9 lpm. Current sweep is 7.0 at 1.0 fio2.

## 2019-04-03 NOTE — PROGRESS NOTES
[facility]  Nephrology  Progress Note    Patient Name: Subhash Ac  MRN: [id number]  Admission Date: 3/29/2019  Hospital Length of Stay: 5 days  Attending Provider: Berlin Calderon MD   Primary Care Physician: Primary Doctor No  Principal Problem:Cardiogenic shock    Subjective:     HPI: Mr. Subhash Ac is a 44 yo male who was admitted ton 3/29 with chief complaint of CP and SOB, found to have STEMI.  He was urgently taken to the cath lab and found to have 100% thrombotic occlusion to the LM, now s/p PCI with RAQUEL with dilation.  An Impella was placed and patient was transferred to ICU for continued management of his cardiogenic shock.  Pressor requirements have continued to increase with rise in lactic acid overnight with continued decline of kidney function to the point of anuria.  Bicarb trending down to 12, ABG with acidemia with pH of 7.1.  Vent settings were adjusted and he was started on a bicarbonate gtt with improvement in his pH to 7.2.  He continues to require Epi/Levo/Vaso for BP support, yet remains hypotensive.  CTS and interventional cardiology both consulted for further help with perfusion, currently being evaluated for Tandem placement either via CT surgery vs. Cath lab.  Nephrology has been consulted for JACOBY in setting of cardiogenic shock.    Interval History:   Patient evaluated at bedside, continues on SLED for clearance and volume assessment. NET negative 2.8 L yesterday     Review of patient's allergies indicates:  No Known Allergies  Current Facility-Administered Medications   Medication Frequency    0.9%  NaCl infusion (CRRT USE ONLY) Continuous    0.9%  NaCl infusion (for blood administration) Q24H PRN    0.9%  NaCl infusion (for blood administration) Q24H PRN    0.9%  NaCl infusion (for blood administration) Q24H PRN    0.9%  NaCl infusion (for blood administration) Q24H PRN    0.9%  NaCl infusion (for blood administration) Q24H PRN    acetaminophen tablet  650 mg Q4H PRN    acetaminophen tablet 650 mg Q6H PRN    amiodarone 360 mg/200 mL (1.8 mg/mL) infusion Continuous    aspirin chewable tablet 81 mg Daily    bisacodyl suppository 10 mg Q12H PRN    chlorhexidine 0.12 % solution 15 mL BID    clopidogrel tablet 75 mg Daily    dextrose 5 % and 0.9 % NaCl with KCl 20 mEq infusion Continuous    dextrose 50% injection 12.5 g PRN    EPINEPHrine (ADRENALIN) 10 mg in sodium chloride 0.9% 250 mL infusion Continuous    fluconazole (DIFLUCAN) IVPB 400 mg 200 mL Q24H    glucagon (human recombinant) injection 1 mg PRN    heparin 25,000 units in dextrose 5% 250 mL (100 units/mL) infusion (heparin infusion) Continuous    hydrocortisone sodium succinate injection 100 mg Q8H    insulin aspart U-100 pen 0-5 Units Q6H PRN    lidocaine 2000 mg in dextrose 5% 250 mL infusion Continuous    magnesium sulfate 2g in water 50mL IVPB (premix) PRN    norepinephrine 16 mg in dextrose 5 % 250 mL infusion Continuous    ondansetron disintegrating tablet 8 mg Q8H PRN    ondansetron injection 4 mg Q12H PRN    oxyCODONE immediate release tablet 5 mg Q4H PRN    oxyCODONE immediate release tablet Tab 10 mg Q4H PRN    pantoprazole injection 40 mg BID    polyethylene glycol packet 17 g Daily    vancomycin in dextrose 5 % 1 gram/250 mL IVPB 1,000 mg Q24H    vasopressin (PITRESSIN) 0.2 Units/mL in dextrose 5 % 100 mL infusion Continuous       Objective:     Vital Signs (Most Recent):  Temp: 98.6 °F (37 °C) (04/03/19 0923)  Pulse: 74 (04/03/19 0923)  Resp: (!) 34 (04/03/19 0923)  BP: (!) 80/68 (04/03/19 0610)  SpO2: 97 % (04/03/19 0923)  O2 Device (Oxygen Therapy): ventilator (04/03/19 0923) Vital Signs (24h Range):  Temp:  [98.3 °F (36.8 °C)-99 °F (37.2 °C)] 98.6 °F (37 °C)  Pulse:  [0-126] 74  Resp:  [10-49] 34  SpO2:  [86 %-100 %] 97 %  BP: (68-80)/(58-68) 80/68  Arterial Line BP: (63-94)/(55-89) 82/73     Weight: 106 kg (233 lb 11 oz) (03/31/19 0500)  Body mass index is 34.51  kg/m².  Body surface area is 2.27 meters squared.    I/O last 3 completed shifts:  In: 91417.8 [I.V.:53947.8; Blood:605; NG/GT:180]  Out: 94142 [Other:45234; Blood:11]    Physical Exam   Constitutional: He appears ill. He is intubated.   HENT:   Head: Normocephalic and atraumatic.   Eyes: Right eye exhibits no discharge. Left eye exhibits no discharge.   Cardiovascular:   RRR. Patient connected to ECMO circuit    Pulmonary/Chest: He is intubated. No respiratory distress.   Abdominal: He exhibits no distension.   Genitourinary:   Genitourinary Comments: Trujillo in place   Musculoskeletal:   Left above knee amputation    Skin: Skin is warm.       Significant Labs:  ABGs:   Recent Labs   Lab 04/03/19  0817   PH 7.450   PCO2 43.7   HCO3 30.3*   POCSATURATED 100   BE 6     BMP:   Recent Labs   Lab 04/03/19  0807         CO2 26   BUN 10   CREATININE 1.1   CALCIUM 10.1   MG 2.3     CBC:   Recent Labs   Lab 04/03/19  0324 04/03/19  0807 04/03/19  0816   WBC 18.32* 17.92*  --    RBC 3.20* 3.14*  --    HGB 9.2* 9.2*  --    HCT 27.7* 27.6* 26*   PLT 41*  --   --    MCV 87 88  --    MCH 28.8 29.3  --    MCHC 33.2 33.3  --      CMP:   Recent Labs   Lab 04/03/19  0807      CALCIUM 10.1   ALBUMIN 3.3*   PROT 5.0*   *   K 4.0  4.0   CO2 26      BUN 10   CREATININE 1.1   ALKPHOS 185*   *   AST 1,227*   BILITOT 13.7*     All labs within the past 24 hours have been reviewed.       Assessment/Plan:     * Cardiogenic shock  - managed by primary team    JACOBY (acute kidney injury)  Anuric JACOBY 2/2 Ischemic ATN from Cardiogenic Shock    42 yo male presenting to hospital with chief complaint of SOB/CP found to have STEMI.  Taken urgently to cathlab, found to have 100% occlusion to LM, now s/p PCI with RAQUEL.  Impella was placed for cardiogenic shock, noted to be in biventricular failure.  CTS/IC evaluation for possible Tandem placement.    Plan/Recommendations:  - Will continue with SLED for clearance and  volume assistance, -500 cc/hr as tolerated by patient maintain MAP> 65. Bath adjusted to chem.   - Continues on mechanical ventilation with FiO2 40%  - Has been NET negative 2.8 L yesterday   - Continues on Epinephrine for blood pressures (off levophed)  - Continues on ECMO  - Will follow closely  - Strict I/Os and chart    ST elevation myocardial infarction (STEMI)  - Managed by primary team        Gordo Kerns  Nephrology  Fellow  [facility]    Pager [phone]

## 2019-04-03 NOTE — PROGRESS NOTES
[facility]  Cardiothoracic Surgery  Progress Note    Patient Name: Subhash Ac  MRN: [id number]  Admission Date: 3/29/2019  Hospital Length of Stay: 5 days  Code Status: Full Code   Attending Physician: Berlin Calderon MD   Referring Provider: Self, Aaareferral  Principal Problem:Cardiogenic shock            Subjective:     Post-Op Info:  Procedure(s) (LRB):  LEFT ABOVE KNEE AMPUTATION REMOVAL OF IMPELLA 5.0 (Left)  FASCIOTOMY 3 COMPARTMENT (Left)  REPAIR, ARTERY, FEMORAL  AMPUTATION, ABOVE KNEE   4 Days Post-Op     Interval History:   Low MAPs this morning, increased pump speed to 4500 from 4000  Cont on Lido @ 4  Epi 0.04  amio 0.5  Hep 1200  tBili 13.7    Medications:  Continuous Infusions:   sodium chloride 0.9%      amiodarone in dextrose 5% 0.5 mg/min (04/03/19 0900)    dextrose 5 % and 0.9 % NaCl with KCl 20 mEq 5 mL/hr (03/29/19 2119)    epinephrine infusion 0.04 mcg/kg/min (04/03/19 0900)    heparin (porcine) in 5 % dex 1,200 Units/hr (04/03/19 0900)    lidocaine 3 mg/min (04/03/19 0900)    norepinephrine bitartrate-D5W Stopped (04/02/19 0600)    vasopressin (PITRESSIN) infusion Stopped (04/02/19 1300)     Scheduled Meds:   aspirin  81 mg Oral Daily    chlorhexidine  15 mL Mouth/Throat BID    clopidogrel  75 mg Oral Daily    fluconazole (DIFLUCAN) IVPB  400 mg Intravenous Q24H    hydrocortisone sodium succinate  100 mg Intravenous Q8H    pantoprazole  40 mg Intravenous BID    polyethylene glycol  17 g Oral Daily    vancomycin (VANCOCIN) IVPB  1,000 mg Intravenous Q24H     PRN Meds:sodium chloride, sodium chloride, sodium chloride, sodium chloride, sodium chloride, acetaminophen, acetaminophen, bisacodyl, dextrose 50%, glucagon (human recombinant), insulin aspart U-100, magnesium sulfate IVPB, ondansetron, ondansetron, oxyCODONE, oxyCODONE     Objective:     Vital Signs (Most Recent):  Temp: 98.6 °F (37 °C) (04/03/19 0923)  Pulse: 74 (04/03/19 0923)  Resp: (!) 34  (04/03/19 0923)  BP: (!) 80/68 (04/03/19 0610)  SpO2: 97 % (04/03/19 0923) Vital Signs (24h Range):  Temp:  [98.3 °F (36.8 °C)-99 °F (37.2 °C)] 98.6 °F (37 °C)  Pulse:  [0-126] 74  Resp:  [10-49] 34  SpO2:  [86 %-100 %] 97 %  BP: (68-80)/(58-68) 80/68  Arterial Line BP: (63-94)/(55-89) 82/73     Weight: 106 kg (233 lb 11 oz)  Body mass index is 34.51 kg/m².    SpO2: 97 %  O2 Device (Oxygen Therapy): ventilator    Intake/Output - Last 3 Shifts       04/01 0700 - 04/02 0659 04/02 0700 - 04/03 0659 04/03 0700 - 04/04 0659    I.V. (mL/kg) 6941.5 (65.5) 7317.3 (69)     Blood 530 605 218    NG/GT  170 20    Total Intake(mL/kg) 7471.5 (70.5) 8092.3 (76.3) 238 (2.2)    Other 9109 81937 1447    Blood 9 9 3    Total Output 9118 62401 1450    Net -1646.5 -2788.7 -1212                 Lines/Drains/Airways     Central Venous Catheter Line                 ECMO Cannula 03/29/19 1800 right femoral artery 4 days         ECMO Cannula 03/29/19 1800 right internal jugular 4 days         Introducer 03/29/19 2015 left femoral vein 4 days         Percutaneous Central Line Insertion/Assessment - triple lumen  03/29/19 2015 left internal jugular 4 days         Trialysis (Dialysis) Catheter 03/29/19 2015 left internal jugular 4 days          Drain                 NG/OG Tube 03/29/19 0300 Center mouth 5 days          Airway                 Airway - Non-Surgical 03/29/19 0223 Endotracheal Tube 5 days          Arterial Line                 Arterial Line 03/29/19 2015 Right Radial 4 days          Line                 Pacer Wires 03/29/19 2015 4 days          Peripheral Intravenous Line                 Peripheral IV - Single Lumen 04/02/19 1345 Right Wrist less than 1 day                Physical Exam   Constitutional:   Intubated.   Off sedation- no appreciated neuro exam   HENT:   Head: Normocephalic and atraumatic.   Cardiovascular:   RRR. Patient connected to ECMO circuit    Pulmonary/Chest: No respiratory distress.   Intubated, PEEP minimal  vent settings now that on ECMO   Abdominal: He exhibits no distension.   Genitourinary:   Genitourinary Comments: [place] in place   Musculoskeletal:      L AKA stump intact. Prevena wound vac in place    Skin:   Skin is cool to touch       Significant Labs:  ABGs:   Recent Labs   Lab 04/03/19  0817   PH 7.450   PCO2 43.7   PO2 421*   HCO3 30.3*   POCSATURATED 100   BE 6     CBC:   Recent Labs   Lab 04/03/19  0324 04/03/19  0807 04/03/19  0816   WBC 18.32* 17.92*  --    RBC 3.20* 3.14*  --    HGB 9.2* 9.2*  --    HCT 27.7* 27.6* 26*   PLT 41*  --   --    MCV 87 88  --    MCH 28.8 29.3  --    MCHC 33.2 33.3  --      CMP:   Recent Labs   Lab 04/03/19  0807      CALCIUM 10.1   ALBUMIN 3.3*   PROT 5.0*   *   K 4.0  4.0   CO2 26      BUN 10   CREATININE 1.1   ALKPHOS 185*   *   AST 1,227*   BILITOT 13.7*     Coagulation:   Recent Labs   Lab 04/03/19  0324 04/03/19  0807   INR 1.7* 1.6*   APTT 49.2*  49.2*  --      Lactic Acid: No results for input(s): LACTATE in the last 48 hours.  All pertinent labs from the last 24 hours have been reviewed.    Significant Diagnostics:  I have reviewed all pertinent imaging results/findings within the past 24 hours. CT head shows large right occipital ischemic stroke.    ROS        Assessment/Plan:     * Cardiogenic shock  44yo male with STEMI who is now s/p LM stent and Impella 3.5 placement.  He was loaded with Brilinta.  In my discussion with the interventional cardiology fellow, his MAP was initially in the 50s on Impella support.  It is now in the high 80s and remains there with weaning of norepinephrine.  Would recommend sodium bicarbonate (2-3 amps) for pH of 7.1.  A more normal pH will help his vasopressors function.  In setting of pulmonary edema, would recommend starting nitric oxide support (20-40ppm).  Would recommend central line to check CVP and would recommend echocardiogram.  Inotropic doses of epinephrine and nitric oxide would support his  right heart as well.  For now would continue with aggressive medical management and I think he could get adequate support from the Impella.  Discussed with Dr. Calderon.    ST elevation myocardial infarction (STEMI)  Neuro:   - Intubated   -Sedation off to access neuro exam. Grimacing to pain on face.  - Head CT shows large ischemic right occipital stroke; worry for hemorrhagic conversion given heparin infusion  - Off nimbex   - no pain medications     Pulmonary:   - Intubated  Vent Mode: A/C  Oxygen Concentration (%):  [40-99] 40  Resp Rate Total:  [10 br/min-35 br/min] 35 br/min  Vt Set:  [300 mL] 300 mL  PEEP/CPAP:  [5 cmH20-8 cmH20] 5 cmH20  Mean Airway Pressure:  [11 lnG35-48 cmH20] 15 cmH20  - ECMO running- started overnight 3/29  - Daily CXRs while intubated  - ABGs PRN  - BTs, DuoNebs     Cardiac:  - Impella removed in OR 3/30  - Epi 0.04   - Wean Lido gtt to off as tolerated  - Increased pump speed to 4500  - Start cardene to keep MAPs <80  - Monitor chest tube output  - Anticoagulation: Heparin @ 1200 this morning, PTT goal 40-70      - bradycardic this am; followed by EP    Renal:    - Anuric on CRRT   - Trend BUN/Cr  - Nephrology following      ID:   - Afebrile   - Ischemic LLE- s/p L AKA on 3/30   - Leukocytosis stable  - lactate steady  - Trend WBC daily  - Ancef, diflucan, vanc     Hem/Onc:   - H/H stable, + 1U today  - Target Plts >50, transfuse PRN   - heparin gtt with PTT goal 50-70     Endocrine:    - Insulin gtt for glucose control  - Endocrine consulted, appreciate assistance     Fluids/Electrolytes/Nutrition/GI:   - NPO  - Replace electrolytes PRN  - bicarb bath with crrt   -replaced calcium  -Patient with shock liver- with AST and ALT elevated but downtrending  - tBili rising, increased ECMO flows to potentially treat congestive hepatopathy     PPx:  - DVT: heparin gtt, SCDs     DISPO:  - Continue SICU care. Poor prognosis.                     Miles Coley MD  Cardiothoracic Surgery  [facility]

## 2019-04-03 NOTE — HPI
Mr. Ac is a 43 year old man admitted on 3/29/19 with an NSTEMI after 1 week of chest pain. He was taken to the cath lab for successful PCI. Since admission he has been intubated and sedated on propofol. His admission has been complicated by cardiogenic shock requiring ECMO, JACOBY secondary to ATN requiring CRRT, Vtach requiring amiodarone infusion, cardioversion, lidocaine infusion and left ischemic limb requiring above knee amputation. His lidocaine level was 8.7 on 4/2 so lidocaine was held.     On 4/1/19 his propofol was held around 0755, and he showed little improvement in his mental status. A CT head was ordered and revealed a right PCA infarct without hemorrhage. Today his nurse has noticed episodes of torso and leg shaking associated with increasing BP concerning for seizure. They last a few minutes and resolve spontaneously and have occurred multiple times throughout the day. He has not received any treatment for these episodes. He has remained off propofol since 4/1 with no improvement in his mental status, only with occasional grimace to pain.
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
## 2019-04-04 PROBLEM — Z51.5 PALLIATIVE CARE ENCOUNTER: Status: ACTIVE | Noted: 2019-01-01
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
## 2019-04-04 NOTE — ASSESSMENT & PLAN NOTE
-in the setting of transmural infarct with likely cause from fixed cardiac scar tissue from STEMI  -lidocaine drip discontinued given concern for lidocaine toxicity, awaiting serum level  -c/w amiodarone drip at 1 mg/min   -c/w current pacer settings  -monitor electrolytes and replete as needed, goal potassium >4 and magnesium >2  -keep defibrillator pads on patient  -c/w to monitor on telemetry   -overall patient's prognosis is guarded, primary team to discuss goals of care with patient's family

## 2019-04-04 NOTE — HPI
As per Chart review   Mr. Ac is a 44 yo gentleman who presented to [facility] via EMS with  STEMI and cardiogenic shock. He quit smoking 2 weeks ago and his wife is uncertain about any family history of cardiac disease. The patient had not previously complained of dyspnea, orthopnea, PND, peripheral edema or syncope. The patient had not previously complained of dyspnea, orthopnea, PND, peripheral edema or syncope.    History obtained from wife - newly  one week ago. She is uncertain about family hx of heart disease   The patient had been complaining of multiple pains in the chest, back and shoulders which would alternate in location. In recent days he had become less physically active and increasingly fatigued. On the evening of admission, the patient did not feel well and went to lay down.    He woke up his wife at 10 pm, was diaphoretic, in a panic, and complaining of severe chest pain (similar to the pains he had experienced during the past week). Immediatley brought to cath lab. T         Hospital/ICU Course:  Admitted on 3/29/19 for STEMI and taken for [facility] w PCI to LM-LAD, IABP placement and immediate removal followed by placement of Impella CP. Patient then placed on Tandem heart VAD with ECMO.   S/P L AKA  And removal of impella.  Still intubated on VA ECMO, off vaso/norepi now on amiodarone and epinephrine drips    Off propofol for >48h  Unresponsive  Shocked three times over night for V tach        Palliative care consulted for goals of care.

## 2019-04-04 NOTE — SUBJECTIVE & OBJECTIVE
Subjective:     Interval History: Patient had an episode of Vtach yesterday with HR in the 200s. Synchronized with 200joules x3. Given amio, lidocaine, Mg sulphate and Potassium chloride. Converted to NSR after.    EEG remains slow with no brainstem reflexes apart from reactive pupils.     Current Neurological Medications:     Current Facility-Administered Medications   Medication Dose Route Frequency Provider Last Rate Last Dose    0.9%  NaCl infusion (CRRT USE ONLY)   Intravenous Continuous Gordo Coronado  mL/hr at 04/03/19 1902      0.9%  NaCl infusion (for blood administration)   Intravenous Q24H PRN Milli Mendosa MD        0.9%  NaCl infusion (for blood administration)   Intravenous Q24H PRN Juan Virk MD        0.9%  NaCl infusion (for blood administration)   Intravenous Q24H PRN Miles Coley MD        0.9%  NaCl infusion (for blood administration)   Intravenous Q24H PRN Jyoti Peck MD        0.9%  NaCl infusion (for blood administration)   Intravenous Q24H PRN Miles Coley MD        0.9%  NaCl infusion (for blood administration)   Intravenous Q24H PRN Miles Coley MD        acetaminophen tablet 650 mg  650 mg Per OG tube Q4H PRN Deana Marina MD        acetaminophen tablet 650 mg  650 mg Oral Q6H PRN Deana Marina MD        amiodarone 360 mg/200 mL (1.8 mg/mL) infusion  1 mg/min Intravenous Continuous Jyoti Peck MD 33.3 mL/hr at 04/04/19 1000 1 mg/min at 04/04/19 1000    aspirin chewable tablet 81 mg  81 mg Oral Daily Berlin Calderon MD   81 mg at 04/04/19 0900    bisacodyl suppository 10 mg  10 mg Rectal Q12H PRN Deana Marina MD        chlorhexidine 0.12 % solution 15 mL  15 mL Mouth/Throat BID Miles Coley MD   15 mL at 04/04/19 0905    clopidogrel tablet 75 mg  75 mg Oral Daily Wesly Berg III, MD   75 mg at 04/04/19 0900    dextrose 5 % and 0.9 % NaCl with KCl 20 mEq infusion  5 mL/hr Intravenous  Continuous Deana Marina MD 5 mL/hr at 03/29/19 2119 5 mL/hr at 03/29/19 2119    dextrose 50% injection 12.5 g  12.5 g Intravenous PRN Latha Little NP        EPINEPHrine (ADRENALIN) 10 mg in sodium chloride 0.9% 250 mL infusion  0.05 mcg/kg/min (Dosing Weight) Intravenous Continuous Deana Marina MD 7.8 mL/hr at 04/04/19 1000 0.05 mcg/kg/min at 04/04/19 1000    fluconazole (DIFLUCAN) IVPB 400 mg 200 mL  400 mg Intravenous Q24H Miles Montoya  mL/hr at 04/04/19 0140 400 mg at 04/04/19 0140    glucagon (human recombinant) injection 1 mg  1 mg Intramuscular PRN Latha Little NP        heparin 25,000 units in dextrose 5% 250 mL (100 units/mL) infusion (heparin infusion)  1,300 Units/hr Intravenous Continuous Miles Coley MD 15 mL/hr at 04/04/19 1000 1,500 Units/hr at 04/04/19 1000    hydrocortisone sodium succinate injection 100 mg  100 mg Intravenous Q8H Miles Montoya MD   100 mg at 04/04/19 0606    insulin aspart U-100 pen 0-5 Units  0-5 Units Subcutaneous Q6H PRN Latha Little NP        lidocaine 2000 mg in dextrose 5% 250 mL infusion  4 mg/min Intravenous Continuous Miles Coley MD   Stopped at 04/03/19 1110    magnesium sulfate 2g in water 50mL IVPB (premix)  2 g Intravenous PRN Gordo Coronado MD   2 g at 04/04/19 0745    niCARdipine 40 mg/200 mL infusion  1 mg/hr Intravenous Continuous Miles Coley MD   Stopped at 04/04/19 0600    ondansetron disintegrating tablet 8 mg  8 mg Oral Q8H PRN Deana Marina MD        ondansetron injection 4 mg  4 mg Intravenous Q12H PRN Deana Marina MD        oxyCODONE immediate release tablet 5 mg  5 mg Oral Q4H PRN Deana Marina MD        oxyCODONE immediate release tablet Tab 10 mg  10 mg Oral Q4H PRN Deana Marina MD        pantoprazole injection 40 mg  40 mg Intravenous BID Momo London MD   40 mg at 04/04/19 0900    polyethylene glycol packet 17 g  17 g Oral Daily Deana  Vicki Marina MD   17 g at 04/03/19 0940    rifAXIMin tablet 550 mg  550 mg Per G Tube BID Abdirizak Ha MD   550 mg at 04/04/19 0905    vancomycin in dextrose 5 % 1 gram/250 mL IVPB 1,000 mg  1,000 mg Intravenous Q24H Momo London MD   1,000 mg at 04/04/19 0900       Review of Systems   Unable to perform ROS: Intubated     Objective:     Vital Signs (Most Recent):  Temp: 98.7 °F (37.1 °C) (04/04/19 0700)  Pulse: 78 (04/04/19 1000)  Resp: (!) 43 (04/04/19 1000)  BP: (!) 80/68 (04/03/19 0610)  SpO2: 100 % (04/04/19 1000) Vital Signs (24h Range):  Temp:  [97.6 °F (36.4 °C)-98.7 °F (37.1 °C)] 98.7 °F (37.1 °C)  Pulse:  [] 78  Resp:  [27-46] 43  SpO2:  [96 %-100 %] 100 %  Arterial Line BP: (56-94)/(48-82) 82/71     Weight: 106 kg (233 lb 11 oz)  Body mass index is 34.51 kg/m².    Physical Exam   Cardiovascular: Tachycardia present.   Nursing note and vitals reviewed.      NEUROLOGICAL EXAMINATION:     MENTAL STATUS   Level of consciousness: unresponsive to painful stimuli       E1VtM1  Does not open eyes or withdraw to painful stimuli.  Pupils brisk.  No gag or corneal.       CRANIAL NERVES     CN III, IV, VI   Right pupil: Reactivity: brisk.   Left pupil: Reactivity: sluggish.     REFLEXES     Reflexes   Right plantar reflex: Silent.      Significant Labs:   CBC:   Recent Labs   Lab 04/03/19  2345  04/04/19  0330 04/04/19  0803 04/04/19  0804   WBC 17.34*  --  16.18* 13.45*  --    HGB 9.5*  --  9.4* 8.1*  --    HCT 28.7*   < > 29.0* 25.6* 31*   PLT 72*  --  59* 44*  --     < > = values in this interval not displayed.     CMP:   Recent Labs   Lab 04/03/19 2000 04/03/19 2200 04/03/19 2345 04/04/19  0330   GLU 95 100 95 104  104  104    136 137 136  136  136   K 4.0 4.0 3.9 4.0  4.0  4.0    103 103 105  105  105   CO2 25 23 23 21*  21*  21*   BUN 13 12 13 13  13  13   CREATININE 1.1 1.1 1.2 1.1  1.1  1.1   CALCIUM 10.7* 10.7* 10.7* 11.4*  11.4*  11.4*   MG 2.3 2.0 1.9 2.5   2.5   PROT 4.9*  --  5.0* 4.8*   ALBUMIN 3.1* 3.2* 3.2* 3.0*  3.0*  3.0*   BILITOT 15.6*  --  16.6* 16.4*   ALKPHOS 185*  --  194* 179*   *  --  802* 694*   *  --  739* 709*   ANIONGAP 8 10 11 10  10  10   EGFRNONAA >60.0 >60.0 >60.0 >60.0  >60.0  >60.0     Inflammatory Markers: No results for input(s): SEDRATE, CRP, PROCAL in the last 48 hours.  POCT Glucose:   Recent Labs   Lab 04/03/19  1811 04/03/19  2351 04/04/19  0605   POCTGLUCOSE 96 105 127*     All pertinent lab results from the past 24 hours have been reviewed.    Significant Imaging: I have reviewed all pertinent imaging results/findings within the past 24 hours.

## 2019-04-04 NOTE — PROGRESS NOTES
[facility]  Heart Transplant  Progress Note    Patient Name: Subhash Ac  MRN: [id number]  Admission Date: 3/29/2019  Hospital Length of Stay: 6 days  Attending Physician: Berlin Cadleron MD  Primary Care Provider: Primary Doctor No  Principal Problem:ST elevation myocardial infarction (STEMI)    Subjective:     Interval History:   Still intubated on VA ECMO, on epi  Had several episodes of sustained MMVT over night with proper function of ECMO and got DCCV, amiodarone boluses and lido boluses multiple times (see notes of Dr Avery)  Has gag and pupillary reflexes    Continuous Infusions:   amiodarone in dextrose 5% 1 mg/min (04/04/19 1200)    epinephrine infusion 0.04 mcg/kg/min (04/04/19 1200)    heparin (porcine) in 5 % dex 1,500 Units/hr (04/04/19 1200)    lidocaine Stopped (04/03/19 1110)    nicardipine Stopped (04/04/19 1220)    vasopressin (PITRESSIN) infusion       Scheduled Meds:   aspirin  81 mg Oral Daily    calcium chloride IVPB  1 g Intravenous Once    chlorhexidine  15 mL Mouth/Throat BID    clopidogrel  75 mg Oral Daily    fluconazole (DIFLUCAN) IVPB  400 mg Intravenous Q24H    hydrocortisone sodium succinate  100 mg Intravenous Q8H    lidocaine HCL 20 mg/ml (2%)  100 mg Other Once    magnesium sulfate IVPB  2 g Intravenous Once    pantoprazole  40 mg Intravenous BID    polyethylene glycol  17 g Oral Daily    potassium chloride in water  40 mEq Intravenous Once    rifAXImin  550 mg Per G Tube BID    vancomycin (VANCOCIN) IVPB  1,000 mg Intravenous Q24H     PRN Meds:sodium chloride, sodium chloride, acetaminophen, acetaminophen, bisacodyl, dextrose 50%, glucagon (human recombinant), insulin aspart U-100, ondansetron, ondansetron, oxyCODONE, oxyCODONE    Review of patient's allergies indicates:  No Known Allergies  Objective:     Vital Signs (Most Recent):  Temp: 98.3 °F (36.8 °C) (04/04/19 1145)  Pulse: 78 (04/04/19 1200)  Resp: (!) 45 (04/04/19 1200)  BP:  (!) 80/68 (04/03/19 0610)  SpO2: 96 % (04/04/19 1200) Vital Signs (24h Range):  Temp:  [97.6 °F (36.4 °C)-98.7 °F (37.1 °C)] 98.3 °F (36.8 °C)  Pulse:  [] 78  Resp:  [27-52] 45  SpO2:  [96 %-100 %] 96 %  Arterial Line BP: (56-94)/(48-82) 89/75     No data found.  Body mass index is 34.51 kg/m².      Intake/Output Summary (Last 24 hours) at 4/4/2019 1236  Last data filed at 4/4/2019 1200  Gross per 24 hour   Intake 6468 ml   Output 93203 ml   Net -3823 ml       Physical Exam   Constitutional:   Cool to touch. Intubated and paralyzed   HENT:   Head: Normocephalic and atraumatic.   Cardiovascular:   Patient connected to ECMO circuit    Pulmonary/Chest: No respiratory distress. He exhibits no tenderness.   Intubated   Abdominal: He exhibits no distension. There is no tenderness. There is no guarding.   Genitourinary:   Genitourinary Comments: Trujillo in place   Musculoskeletal:   Paralyzed.   LLE cool to touch. Anterior shin modeled    Skin:   Skin is cool to touch   Neurologic:  Pupillary reflexes present BL, not withdrawing to pain      Significant Labs:  CBC:  Recent Labs   Lab 04/04/19  0330 04/04/19  0803 04/04/19  0804 04/04/19  1200   WBC 16.18* 13.45*  --  14.24*   RBC 3.33* 2.84*  --  3.30*   HGB 9.4* 8.1*  --  9.3*   HCT 29.0* 25.6* 31* 29.3*   PLT 59* 44*  --  87*   MCV 87 90  --  89   MCH 28.2 28.5  --  28.2   MCHC 32.4 31.6*  --  31.7*     BNP:  Recent Labs   Lab 03/29/19  0052   *     CMP:  Recent Labs   Lab 04/03/19  2345 04/04/19  0330 04/04/19  0803   GLU 95 104  104  104 88   CALCIUM 10.7* 11.4*  11.4*  11.4* 8.2*   ALBUMIN 3.2* 3.0*  3.0*  3.0* 2.4*   PROT 5.0* 4.8* 3.8*    136  136  136 139   K 3.9 4.0  4.0  4.0 3.3*   CO2 23 21*  21*  21* 19*    105  105  105 114*   BUN 13 13  13  13 10   CREATININE 1.2 1.1  1.1  1.1 0.7   ALKPHOS 194* 179* 142*   * 709* 524*   * 694* 488*   BILITOT 16.6* 16.4* 14.0*      Coagulation:   Recent Labs   Lab  04/03/19  1813  04/03/19  2345 04/04/19  0100 04/04/19  0330 04/04/19  0554 04/04/19  0803   INR  --    < > 1.6*  --  1.7*  --  1.9*   APTT 46.4*  --   --  52.5*  --  59.3*  --     < > = values in this interval not displayed.     LDH:  Recent Labs   Lab 04/02/19  0345 04/03/19  0459 04/04/19  0330   LDH 3,206* 2,380* 1,906*     Microbiology:  Microbiology Results (last 7 days)     Procedure Component Value Units Date/Time    Blood culture [[id number]]     Order Status:  Canceled Specimen:  Blood     Blood culture [[id number]]     Order Status:  Canceled Specimen:  Blood           I have reviewed all pertinent labs within the past 24 hours.    Estimated Creatinine Clearance: 163.2 mL/min (based on SCr of 0.7 mg/dL).    Diagnostic Results:  I have reviewed all pertinent imaging results/findings within the past 24 hours.    Assessment and Plan:     43 year old male admitted for code STEMI and taken for [facility] w PCI to LM-LAD, IABP placement and immediate removal followed by placement of Impella CP. He received escalating doses of epinephrine without significant improvement of his cardiogenic shock. By the time of our encounter his SVO2 was 52 w calculated CO of 4.03 / CI 1.8, deteriorating renal function and hemolysis.       Multi-organ failure with heart failure  -Per cardiogenic shock section      Cardiogenic shock  43yoM admitted w STEMI underwent PCI to LM/LAD and developed cardiogenic shock, briefly on IABP, followed by Impella CP and high doses of epinephrine. Eventually progressing to multiple organ failure requiring Tandem Heart VAD with VA-ECMO on 03/29//19. Further complicated by developing ALI of the LLE s/p urgent Left AKA with CTS on 03/30/19. Since amputation improvement of his hemodynamic status with adequate ECMO flows ~ 5L and speed 4300. Clearing Lactic acid, LFTs and CPK down trending. Remained intubation, off sedation in the morning to evaluate for neurological recovery.   - CTS primary  - HTS will  follow along, please call for any questions or concerns  - Wean off epinephrine  - Recommend continued optimization from his Cardiogenic shock standpoint  - Agree with holding sedation to evaluation neurological status  - Monitor Lactic, CMP, and CPK until normalizes   - Wean off Vent support as tolerated   - F/U lido levels  - F/U neuro and EEG    Seen w Dr Simin Butler MD  Heart Transplant  [facility]

## 2019-04-04 NOTE — PLAN OF CARE
Problem: Adult Inpatient Plan of Care  Goal: Plan of Care Review  Outcome: Ongoing (interventions implemented as appropriate)  Pt remains on VA ECMO. Converted to v-tach @ 0730, BP stable, given calcium chloride and magnesium per MD order. Sinus rhythm achieved. Meds given at scheduled time. Blood pressure monitored closely to keep MAP within goal of 60-80. Pt converted to v-tach @1220, BP stable, given a bolus of lidocaine and lidocaine gtt started per MD order. Neurology and CTS met with family during the day and spoke about pt status, plans to make pt a DNR and withdraw care. Discontinued EEG. Plan to wean ECMO. Palliative care met with family. Pt receiving comfort measures. Will continue to monitor pt closely.

## 2019-04-04 NOTE — SIGNIFICANT EVENT
Called to bedside for sustained V-tach at 199 bpm.Flows on ECMO stable, arterial waveform noted with suitable blood pressure. Was on Epi 0.04, amio 0.5, heparin gtt. Lidocaine had been stopped d/t toxicity. Gave calcium chloride 1gm, Mg sulfate 2gm, potassium chloride 40 mEq and Amiodarone 150 mg bolus. Gave lidocaine 2% 100 mg/5ml and Increased amio gtt to 1. Patient synchronized with 200 joules x 3 shocks and cardiology fellow at bedside to assist with TVP rate control. Converted into NSR rate 70s. ECMO flows stable 5 lpm.

## 2019-04-04 NOTE — PROGRESS NOTES
[facility]  Critical Care - Surgery  Progress Note    Patient Name: Subhash Ac  MRN: [id number]  Admission Date: 3/29/2019  Hospital Length of Stay: 6 days  Code Status: Full Code  Attending Provider: Berlin Calderon MD  Primary Care Provider: Primary Doctor No   Principal Problem: ST elevation myocardial infarction (STEMI)    Subjective:     Hospital/ICU Course:  Admitted on 3/29/19 for STEMI and taken for [facility] w PCI to LM-LAD, IABP placement and immediate removal followed by placement of Impella CP. Patient then placed on Tandem heart VAD with ECMO. Transferred to SICU for postop care.         Interval History/Significant Events: multiple episodes of Vtach requiring shocks/lidocaine    Follow-up For: Procedure(s) (LRB):  LEFT ABOVE KNEE AMPUTATION REMOVAL OF IMPELLA 5.0 (Left)  FASCIOTOMY 3 COMPARTMENT (Left)  REPAIR, ARTERY, FEMORAL  AMPUTATION, ABOVE KNEE    Post-Operative Day: 5 Days Post-Op    Objective:     Vital Signs (Most Recent):  Temp: 98.7 °F (37.1 °C) (04/04/19 0700)  Pulse: 78 (04/04/19 0945)  Resp: (!) 39 (04/04/19 0945)  BP: (!) 80/68 (04/03/19 0610)  SpO2: 100 % (04/04/19 0945) Vital Signs (24h Range):  Temp:  [97.6 °F (36.4 °C)-98.7 °F (37.1 °C)] 98.7 °F (37.1 °C)  Pulse:  [] 78  Resp:  [27-46] 39  SpO2:  [96 %-100 %] 100 %  Arterial Line BP: (56-94)/(48-82) 81/69     Weight: 106 kg (233 lb 11 oz)  Body mass index is 34.51 kg/m².      Intake/Output Summary (Last 24 hours) at 4/4/2019 1000  Last data filed at 4/4/2019 0900  Gross per 24 hour   Intake 8183 ml   Output 9892 ml   Net -1709 ml       Physical Exam   Constitutional:   Intubated.   Off sedation- no appreciated neuro exam   HENT:   Head: Normocephalic and atraumatic.   Cardiovascular:   RRR. Patient connected to ECMO circuit    Pulmonary/Chest: No respiratory distress.   Intubated, PEEP minimal vent settings now that on ECMO   Abdominal: He exhibits no distension.   Genitourinary:   Genitourinary Comments:  T[place] in place   Musculoskeletal:      L AKA stump intact. Prevena wound vac in place    Skin:   Skin is cool to touch       Vents:  Vent Mode: A/C (04/04/19 0945)  Ventilator Initiated: Yes (03/29/19 0333)  Set Rate: 10 bmp (04/04/19 0945)  Vt Set: 300 mL (04/04/19 0945)  Pressure Support: 0 cmH20 (03/29/19 1341)  PEEP/CPAP: 5 cmH20 (04/04/19 0945)  Oxygen Concentration (%): 40 (04/04/19 0945)  Peak Airway Pressure: 41 cmH2O (04/04/19 0945)  Plateau Pressure: 26 cmH20 (04/04/19 0945)  Total Ve: 11.3 mL (04/04/19 0945)  F/VT Ratio<105 (RSBI): 158.54 (04/04/19 0945)    Lines/Drains/Airways     Central Venous Catheter Line                 ECMO Cannula 03/29/19 1800 right femoral artery 5 days         ECMO Cannula 03/29/19 1800 right internal jugular 5 days         Introducer 03/29/19 2015 left femoral vein 5 days         Percutaneous Central Line Insertion/Assessment - triple lumen  03/29/19 2015 left internal jugular 5 days         Trialysis (Dialysis) Catheter 03/29/19 2015 left internal jugular 5 days          Drain                 NG/OG Tube 03/29/19 0300 Center mouth 6 days          Airway                 Airway - Non-Surgical 03/29/19 0223 Endotracheal Tube 6 days          Arterial Line                 Arterial Line 03/29/19 2015 Right Radial 5 days          Line                 Pacer Wires 03/29/19 2015 5 days          Peripheral Intravenous Line                 Peripheral IV - Single Lumen 04/02/19 1345 Right Wrist 1 day                Significant Labs:    CBC/Anemia Profile:  Recent Labs   Lab 04/03/19  2345  04/04/19  0330 04/04/19  0803 04/04/19  0804   WBC 17.34*  --  16.18* 13.45*  --    HGB 9.5*  --  9.4* 8.1*  --    HCT 28.7*   < > 29.0* 25.6* 31*   PLT 72*  --  59* 44*  --    MCV 86  --  87 90  --    RDW 17.1*  --  17.2* 17.6*  --     < > = values in this interval not displayed.        Chemistries:  Recent Labs   Lab 04/03/19 2000 04/03/19  2200 04/03/19  2345 04/04/19  0330    136 137 136  136   136   K 4.0 4.0 3.9 4.0  4.0  4.0    103 103 105  105  105   CO2 25 23 23 21*  21*  21*   BUN 13 12 13 13  13  13   CREATININE 1.1 1.1 1.2 1.1  1.1  1.1   CALCIUM 10.7* 10.7* 10.7* 11.4*  11.4*  11.4*   ALBUMIN 3.1* 3.2* 3.2* 3.0*  3.0*  3.0*   PROT 4.9*  --  5.0* 4.8*   BILITOT 15.6*  --  16.6* 16.4*   ALKPHOS 185*  --  194* 179*   *  --  739* 709*   *  --  802* 694*   MG 2.3 2.0 1.9 2.5  2.5   PHOS 2.2* 2.7  --  2.6*  2.6*       All pertinent labs within the past 24 hours have been reviewed.    Significant Imaging:  I have reviewed all pertinent imaging results/findings within the past 24 hours.    Assessment/Plan:     * ST elevation myocardial infarction (STEMI)  43 y.o. male s/p STEMI and taken for [facility] w PCI to LM-LAD, IABP placement and immediate removal followed by placement of Impella CP. Patient then placed on Tandem heart VAD with ECMO on 3/29.  Patient taken back to OR for left AKA 3/30 with removal of impella.    Neuro:   - remains intubated, off sedation and paralytic   - no peripheral withdrawal to pain, not following commands, no longer grimacing  - family discussions per CTS  - neurology evaluation pending with EEG  - CT demonstrated recent right PCA infarct 4/1      Pulmonary:   - Intubated  - ECMO running  - Daily CXRs while intubated  - ABGs PRN  - BTs, DuoNebs    Cardiac:   - 3/28-3/29: code STEMI and taken for [facility] w PCI to LM-LAD, IABP placement and immediate removal followed by placement of Impella CP. Continued heart failure then led to placement of Tandem heart VAD with ECMO.  Impella removed.  - Epi, Norepi, Vaso gtt's, currently only on Epi at 0.06  - Wean pressors per CTS  - Monitor chest tube output  - Anticoagulation per CTS    Renal:    - Anuric on CRRT   - Trend BUN/Cr  - Nephrology on board    ID:   - afebrile   - Leukocytosis 26 postop, today 16  - Trend WBC daily  - post op ancef completed  - continuing on fluconazole  - started on vanc  4/2    Hem/Onc:   - H/H stable  - given 1 unit plts yesterday  - heparin gtt    Endocrine:    - Insulin gtt for glucose control  - Endocrine consulted, appreciate assistance    Fluids/Electrolytes/Nutrition/GI:   - Replace electrolytes PRN  - mIVF per CTS  - Currently NPO, diet per CTS  - T bili at 16    PPx:  - DVT: heparin gtt, SCDs  - PUP:  Protonix    DISPO:  - Continue SICU care  - Goals of care discussion per primary    Primary: CTS         Harley Gaspar MD  Critical Care - Surgery  [facility]

## 2019-04-04 NOTE — PLAN OF CARE
Interval update:     Called to bedside by SICU nursing for sustained MMVT HR 190s. Patient seen. Patient on Epi 0.04, Amiodarone 1mg/hr. (SEE PRIOR EVENT NOTE). Prior to my arrival given Lidocaine 100mg bolus. Ordered repeat Amiodarone bolus. Attempted to ATP'd with TVP which was unsuccessful. Given synchronized 200j x2 not able to convert but developed slow MMVT HR 150s. Folloed by synchronized 200j x1, successfully converted to NSR, TVP setting HR 60 BPM, 25mA.      Awaiting Lidocaine level (will pass on to daytime fellow to call laboratory to expedite processing)   Continue Amiodarone 1mg/hr   Wean off Inotropic / vasopressor support as tolerated   Unfortunately poor prognosis. Discussed with Dr. Cali, agrees with plans above       Marilyn Avery M.D.  Page # [phone]  Cardiovascular Fellow PGY-IV  [facility]

## 2019-04-04 NOTE — TREATMENT PLAN
Cardiac function assessed this morning after ECMO flows temporarily dropped without evidence of intrinsic cardiac function.  He has not demonstrated neurologic improvement off sedation since 4/1 @ approx 0700. We have explained to the family that his chance of making a meaningful recovery is dismal. We have also spoken to the family regarding the patient's wishes from prior to this acute issue. All questions were answered and support offered.    After multiple meetings today with the family, and in consultation with Neurology, SICU, and Palliative teams, the family has elected to withdraw life support including ECMO.  In combination with Palliative care we have implemented withdrawal of care and comfort measures.     Miles Coley MD   Pager: [phone]  General Surgery PGY-II  [facility]

## 2019-04-04 NOTE — SUBJECTIVE & OBJECTIVE
Interval History/Significant Events: multiple episodes of Vtach requiring shocks/lidocaine    Follow-up For: Procedure(s) (LRB):  LEFT ABOVE KNEE AMPUTATION REMOVAL OF IMPELLA 5.0 (Left)  FASCIOTOMY 3 COMPARTMENT (Left)  REPAIR, ARTERY, FEMORAL  AMPUTATION, ABOVE KNEE    Post-Operative Day: 5 Days Post-Op    Objective:     Vital Signs (Most Recent):  Temp: 98.7 °F (37.1 °C) (04/04/19 0700)  Pulse: 78 (04/04/19 0945)  Resp: (!) 39 (04/04/19 0945)  BP: (!) 80/68 (04/03/19 0610)  SpO2: 100 % (04/04/19 0945) Vital Signs (24h Range):  Temp:  [97.6 °F (36.4 °C)-98.7 °F (37.1 °C)] 98.7 °F (37.1 °C)  Pulse:  [] 78  Resp:  [27-46] 39  SpO2:  [96 %-100 %] 100 %  Arterial Line BP: (56-94)/(48-82) 81/69     Weight: 106 kg (233 lb 11 oz)  Body mass index is 34.51 kg/m².      Intake/Output Summary (Last 24 hours) at 4/4/2019 1000  Last data filed at 4/4/2019 0900  Gross per 24 hour   Intake 8183 ml   Output 9892 ml   Net -1709 ml       Physical Exam   Constitutional:   Intubated.   Off sedation- no appreciated neuro exam   HENT:   Head: Normocephalic and atraumatic.   Cardiovascular:   RRR. Patient connected to ECMO circuit    Pulmonary/Chest: No respiratory distress.   Intubated, PEEP minimal vent settings now that on ECMO   Abdominal: He exhibits no distension.   Genitourinary:   Genitourinary Comments: [place] in place   Musculoskeletal:      L AKA stump intact. Prevena wound vac in place    Skin:   Skin is cool to touch       Vents:  Vent Mode: A/C (04/04/19 0945)  Ventilator Initiated: Yes (03/29/19 0333)  Set Rate: 10 bmp (04/04/19 0945)  Vt Set: 300 mL (04/04/19 0945)  Pressure Support: 0 cmH20 (03/29/19 1341)  PEEP/CPAP: 5 cmH20 (04/04/19 0945)  Oxygen Concentration (%): 40 (04/04/19 0945)  Peak Airway Pressure: 41 cmH2O (04/04/19 0945)  Plateau Pressure: 26 cmH20 (04/04/19 0945)  Total Ve: 11.3 mL (04/04/19 0945)  F/VT Ratio<105 (RSBI): 158.54 (04/04/19 0945)    Lines/Drains/Airways     Central Venous Catheter Line                  ECMO Cannula 03/29/19 1800 right femoral artery 5 days         ECMO Cannula 03/29/19 1800 right internal jugular 5 days         Introducer 03/29/19 2015 left femoral vein 5 days         Percutaneous Central Line Insertion/Assessment - triple lumen  03/29/19 2015 left internal jugular 5 days         Trialysis (Dialysis) Catheter 03/29/19 2015 left internal jugular 5 days          Drain                 NG/OG Tube 03/29/19 0300 Center mouth 6 days          Airway                 Airway - Non-Surgical 03/29/19 0223 Endotracheal Tube 6 days          Arterial Line                 Arterial Line 03/29/19 2015 Right Radial 5 days          Line                 Pacer Wires 03/29/19 2015 5 days          Peripheral Intravenous Line                 Peripheral IV - Single Lumen 04/02/19 1345 Right Wrist 1 day                Significant Labs:    CBC/Anemia Profile:  Recent Labs   Lab 04/03/19 2345 04/04/19 0330 04/04/19  0803 04/04/19  0804   WBC 17.34*  --  16.18* 13.45*  --    HGB 9.5*  --  9.4* 8.1*  --    HCT 28.7*   < > 29.0* 25.6* 31*   PLT 72*  --  59* 44*  --    MCV 86  --  87 90  --    RDW 17.1*  --  17.2* 17.6*  --     < > = values in this interval not displayed.        Chemistries:  Recent Labs   Lab 04/03/19 2000 04/03/19 2200 04/03/19 2345 04/04/19  0330    136 137 136  136  136   K 4.0 4.0 3.9 4.0  4.0  4.0    103 103 105  105  105   CO2 25 23 23 21*  21*  21*   BUN 13 12 13 13  13  13   CREATININE 1.1 1.1 1.2 1.1  1.1  1.1   CALCIUM 10.7* 10.7* 10.7* 11.4*  11.4*  11.4*   ALBUMIN 3.1* 3.2* 3.2* 3.0*  3.0*  3.0*   PROT 4.9*  --  5.0* 4.8*   BILITOT 15.6*  --  16.6* 16.4*   ALKPHOS 185*  --  194* 179*   *  --  739* 709*   *  --  802* 694*   MG 2.3 2.0 1.9 2.5  2.5   PHOS 2.2* 2.7  --  2.6*  2.6*       All pertinent labs within the past 24 hours have been reviewed.    Significant Imaging:  I have reviewed all pertinent imaging results/findings within the  past 24 hours.

## 2019-04-04 NOTE — PROGRESS NOTES
[facility]  Nephrology  Progress Note    Patient Name: Subhash Ac  MRN: [id number]  Admission Date: 3/29/2019  Hospital Length of Stay: 6 days  Attending Provider: Berlin Calderon MD   Primary Care Physician: Primary Doctor No  Principal Problem:ST elevation myocardial infarction (STEMI)    Subjective:     Interval History:   Continues on SLED this morning, net negative ~2L/24h.  Pressor requirements stable.  Requiring PRN infusions of KCL/CaCl for repletion despite being on SLED.  Possible withdrawal of care today.    Review of patient's allergies indicates:  No Known Allergies  Current Facility-Administered Medications   Medication Frequency    0.9%  NaCl infusion (for blood administration) Q24H PRN    0.9%  NaCl infusion (for blood administration) Q24H PRN    acetaminophen tablet 650 mg Q4H PRN    acetaminophen tablet 650 mg Q6H PRN    amiodarone 360 mg/200 mL (1.8 mg/mL) infusion Continuous    aspirin chewable tablet 81 mg Daily    bisacodyl suppository 10 mg Q12H PRN    calcium chloride 1 g in dextrose 5 % 100 mL IVPB Once    chlorhexidine 0.12 % solution 15 mL BID    clopidogrel tablet 75 mg Daily    dextrose 50% injection 12.5 g PRN    EPINEPHrine (ADRENALIN) 10 mg in sodium chloride 0.9% 250 mL infusion Continuous    fluconazole (DIFLUCAN) IVPB 400 mg 200 mL Q24H    glucagon (human recombinant) injection 1 mg PRN    heparin 25,000 units in dextrose 5% 250 mL (100 units/mL) infusion (heparin infusion) Continuous    hydrocortisone sodium succinate injection 100 mg Q8H    insulin aspart U-100 pen 0-5 Units Q6H PRN    lidocaine (cardiac) injection 100 mg Once    lidocaine 2000 mg in dextrose 5% 250 mL infusion Continuous    lidocaine HCL 20 mg/ml (2%) injection 100 mg Once    magnesium sulfate 2g in water 50mL IVPB (premix) Once    niCARdipine 40 mg/200 mL infusion Continuous    ondansetron disintegrating tablet 8 mg Q8H PRN    ondansetron injection 4 mg Q12H PRN     oxyCODONE immediate release tablet 5 mg Q4H PRN    oxyCODONE immediate release tablet Tab 10 mg Q4H PRN    pantoprazole injection 40 mg BID    polyethylene glycol packet 17 g Daily    rifAXIMin tablet 550 mg BID    vancomycin in dextrose 5 % 1 gram/250 mL IVPB 1,000 mg Q24H    vasopressin (PITRESSIN) 0.2 Units/mL in dextrose 5 % 100 mL infusion Continuous       Objective:     Vital Signs (Most Recent):  Temp: 98.3 °F (36.8 °C) (04/04/19 1145)  Pulse: 78 (04/04/19 1300)  Resp: (!) 46 (04/04/19 1300)  BP: (!) 80/68 (04/03/19 0610)  SpO2: 95 % (04/04/19 1300)  O2 Device (Oxygen Therapy): ventilator (04/04/19 1300) Vital Signs (24h Range):  Temp:  [97.6 °F (36.4 °C)-98.7 °F (37.1 °C)] 98.3 °F (36.8 °C)  Pulse:  [] 78  Resp:  [27-60] 46  SpO2:  [95 %-100 %] 95 %  Arterial Line BP: (56-94)/(48-82) 83/68     Weight: 106 kg (233 lb 11 oz) (03/31/19 0500)  Body mass index is 34.51 kg/m².  Body surface area is 2.27 meters squared.    I/O last 3 completed shifts:  In: 36558.3 [I.V.:04550.3; Blood:1166; NG/GT:130; IV Piggyback:95]  Out: 86959 [Other:76459; Blood:21]    Physical Exam   Constitutional: He appears ill. He is intubated.   HENT:   Head: Normocephalic and atraumatic.   Eyes: Right eye exhibits no discharge. Left eye exhibits no discharge.   Cardiovascular:   RRR. Patient connected to ECMO circuit    Pulmonary/Chest: He is intubated. No respiratory distress.   Abdominal: He exhibits no distension.   Genitourinary:   Genitourinary Comments: Trujillo in place   Musculoskeletal:   Left above knee amputation    Skin: Skin is warm.       Significant Labs:  ABGs:   Recent Labs   Lab 04/04/19  1113   PH 7.457*   PCO2 31.7*   HCO3 22.4*   POCSATURATED 99   BE -2     CBC:   Recent Labs   Lab 04/04/19  1200   WBC 14.24*   RBC 3.30*   HGB 9.3*   HCT 29.3*   PLT 87*   MCV 89   MCH 28.2   MCHC 31.7*     CMP:   Recent Labs   Lab 04/04/19  0803   GLU 88   CALCIUM 8.2*   ALBUMIN 2.4*   PROT 3.8*      K 3.3*   CO2  19*   *   BUN 10   CREATININE 0.7   ALKPHOS 142*   *   *   BILITOT 14.0*            Assessment/Plan:     JACOBY (acute kidney injury)  Anuric JACOBY 2/2 Ischemic ATN from Cardiogenic Shock    42 yo male presenting to hospital with chief complaint of SOB/CP found to have STEMI.  Taken urgently to cathlab, found to have 100% occlusion to LM, now s/p PCI with RAQUEL.  Impella was placed for cardiogenic shock, noted to be in biventricular failure.  CTS/IC evaluation for possible Tandem placement.    Plan/Recommendations:  - Will continue with SLED for clearance and volume assistance,  cc/hr as tolerated by patient maintain MAP> 65. Bath adjusted to chem.   - Has been NET negative 2L yesterday  - Continues on ECMO  - Will follow closely  - Strict I/Os and chart  -CaCl IVPB 1 gm IVPB x 1   -KCL 40meq IVPB x 1  -continue high K/high Ca bath      Miguel Dallas, ROSEMARY  Nephrology  [facility]

## 2019-04-04 NOTE — SUBJECTIVE & OBJECTIVE
Interval History: Overnight patient had two episodes of MMVT with HR in 200s. Patient was given lidocaine boluses as well as amiodarone boluses and amiodarone drip increased to 1 m/ghr. Attempted to ATP the arrthymia with TVP which was unsuccessful. He was given synchronized 200J x2 not able to convert but developed slow MMVT HR 150s that was followed by synchronized 200J x1 which successfully converted to NSR. This morning at 7:30 a.m., pt went into MMVT again but was hemodynamically stable with ECMO. Primary team requesting that no further cardioversion or boluses of anti-arrthymic given as awaiting goals of care and family discussion. Of note, awaiting lidocaine level, being processed in the lab.     Review of Systems   Unable to perform ROS: acuity of condition     Objective:     Vital Signs (Most Recent):  Temp: 98.7 °F (37.1 °C) (04/04/19 0700)  Pulse: (!) 205 (04/04/19 0900)  Resp: (!) 42 (04/04/19 0900)  BP: (!) 80/68 (04/03/19 0610)  SpO2: 100 % (04/04/19 0900) Vital Signs (24h Range):  Temp:  [97.6 °F (36.4 °C)-99.3 °F (37.4 °C)] 98.7 °F (37.1 °C)  Pulse:  [] 205  Resp:  [27-46] 42  SpO2:  [96 %-100 %] 100 %  Arterial Line BP: (56-94)/(48-82) 74/65     Weight: 106 kg (233 lb 11 oz)  Body mass index is 34.51 kg/m².     SpO2: 100 %  O2 Device (Oxygen Therapy): ventilator    Physical Exam   HENT:   ETT in place   Cardiovascular: Normal rate and regular rhythm.   VA ECMO circuit in place   Pulmonary/Chest:   Rhonchi throughout lung fields   Abdominal: Soft. He exhibits no distension. There is no tenderness.   Musculoskeletal:   Left AKA    Neurological:   Grimacing to pain    Skin: Skin is warm.       Significant Labs: All pertinent lab results from the last 24 hours have been reviewed.    Significant Imaging: Echocardiogram:   Transthoracic echo (TTE) complete (Cupid Only):   Results for orders placed or performed during the hospital encounter of 03/29/19   Transthoracic echo (TTE) complete (Cupid Only)    Result Value Ref Range    STJ 2.41 cm    IVS 1.06 0.6 - 1.1 cm    LA size 4.02 cm    Left Atrium Major Axis 3.96 cm    Left Atrium Minor Axis 4.79 cm    LVIDD 5.18 3.5 - 6.0 cm    LVIDS 4.53 (A) 2.1 - 4.0 cm    LVOT diameter 2.03 cm    PW 1.02 0.6 - 1.1 cm    MV Peak A Angel 0.26 m/s    E wave decelartion time 81.67 msec    MV Peak E Angel 0.31 m/s    RA Major Axis 3.43 cm    RA Width 2.04 cm    RVDD 2.47 cm    Sinus 2.98 cm    TAPSE 1.06 cm    LA WIDTH 3.00 cm    LV Diastolic Volume 128.37 mL    LV Systolic Volume 93.99 mL    FS 13 %    LA volume 44.44 cm3    LV mass 203.33 g    Left Ventricle Relative Wall Thickness 0.39 cm    E/A ratio 1.19     LVOT area 3.23 cm2    LV Systolic Volume Index 43.0 mL/m2    LV Diastolic Volume Index 58.67 mL/m2    LA Volume Index 20.3 mL/m2    LV Mass Index 92.9 g/m2    BSA 2.25 m2

## 2019-04-04 NOTE — SUBJECTIVE & OBJECTIVE
Interval History:   ECMO speed @ 4500, no flow issues on total support  Vtach overnight and this morning at approx 0300 and 0600, returned to NSR after Lido, Amio boluses, Cardioversion x 2 and Cardioversion x 3.   Returned to V tach 1.5hrs afterwards, withholding more shocks for now as it is not impacting his flows.   Lido off this morning   Epi 0.06  amio 1  Hep 1500  tBili 13.7    Medications:  Continuous Infusions:   sodium chloride 0.9% 200 mL/hr at 04/03/19 1902    amiodarone in dextrose 5% 1 mg/min (04/04/19 0800)    dextrose 5 % and 0.9 % NaCl with KCl 20 mEq 5 mL/hr (03/29/19 2119)    epinephrine infusion 0.06 mcg/kg/min (04/04/19 0800)    heparin (porcine) in 5 % dex 1,500 Units/hr (04/04/19 0800)    lidocaine Stopped (04/03/19 1110)    nicardipine Stopped (04/04/19 0600)     Scheduled Meds:   aspirin  81 mg Oral Daily    calcium chloride  1 g Intravenous Once    chlorhexidine  15 mL Mouth/Throat BID    clopidogrel  75 mg Oral Daily    fluconazole (DIFLUCAN) IVPB  400 mg Intravenous Q24H    hydrocortisone sodium succinate  100 mg Intravenous Q8H    pantoprazole  40 mg Intravenous BID    polyethylene glycol  17 g Oral Daily    rifAXImin  550 mg Per G Tube BID    vancomycin (VANCOCIN) IVPB  1,000 mg Intravenous Q24H     PRN Meds:sodium chloride, sodium chloride, sodium chloride, sodium chloride, acetaminophen, acetaminophen, bisacodyl, dextrose 50%, glucagon (human recombinant), insulin aspart U-100, magnesium sulfate IVPB, ondansetron, ondansetron, oxyCODONE, oxyCODONE     Objective:     Vital Signs (Most Recent):  Temp: 98.7 °F (37.1 °C) (04/04/19 0700)  Pulse: (!) 205 (04/04/19 0815)  Resp: (!) 42 (04/04/19 0815)  BP: (!) 80/68 (04/03/19 0610)  SpO2: 100 % (04/04/19 0815) Vital Signs (24h Range):  Temp:  [97.6 °F (36.4 °C)-99.3 °F (37.4 °C)] 98.7 °F (37.1 °C)  Pulse:  [] 205  Resp:  [19-46] 42  SpO2:  [91 %-100 %] 100 %  Arterial Line BP: (56-94)/(48-82) 76/66     Weight: 106 kg  (233 lb 11 oz)  Body mass index is 34.51 kg/m².    SpO2: 100 %  O2 Device (Oxygen Therapy): ventilator    Intake/Output - Last 3 Shifts       04/02 0700 - 04/03 0659 04/03 0700 - 04/04 0659 04/04 0700 - 04/05 0659    I.V. (mL/kg) 7317.3 (69) 7390 (69.7)     Blood 605 916     NG/ 20     IV Piggyback  95     Total Intake(mL/kg) 8092.3 (76.3) 8421 (79.4)     Other 49936 88315 788    Blood 9 18     Total Output 20727 92071 788    Net -2788.7 -2137 -788                 Lines/Drains/Airways     Central Venous Catheter Line                 ECMO Cannula 03/29/19 1800 right femoral artery 5 days         ECMO Cannula 03/29/19 1800 right internal jugular 5 days         Introducer 03/29/19 2015 left femoral vein 5 days         Percutaneous Central Line Insertion/Assessment - triple lumen  03/29/19 2015 left internal jugular 5 days         Trialysis (Dialysis) Catheter 03/29/19 2015 left internal jugular 5 days          Drain                 NG/OG Tube 03/29/19 0300 Center mouth 6 days          Airway                 Airway - Non-Surgical 03/29/19 0223 Endotracheal Tube 6 days          Arterial Line                 Arterial Line 03/29/19 2015 Right Radial 5 days          Line                 Pacer Wires 03/29/19 2015 5 days          Peripheral Intravenous Line                 Peripheral IV - Single Lumen 04/02/19 1345 Right Wrist 1 day                Physical Exam   Constitutional:   Intubated.   Off sedation- no appreciated neuro exam   HENT:   Head: Normocephalic and atraumatic.   Cardiovascular:   RRR. Patient connected to ECMO circuit    Pulmonary/Chest: No respiratory distress.   Intubated, PEEP minimal vent settings now that on ECMO   Abdominal: He exhibits no distension.   Genitourinary:   Genitourinary Comments: Trujillo in place   Musculoskeletal:      L AKA stump intact. Prevena wound vac in place    Skin:   Skin is cool to touch       Significant Labs:  ABGs:   Recent Labs   Lab 04/04/19  0804   PH 7.346*   PCO2  49.0*   PO2 382*   HCO3 26.7   POCSATURATED 100   BE 1     CBC:   Recent Labs   Lab 04/04/19  0330 04/04/19  0804   WBC 16.18*  --    RBC 3.33*  --    HGB 9.4*  --    HCT 29.0* 31*   PLT 59*  --    MCV 87  --    MCH 28.2  --    MCHC 32.4  --      CMP:   Recent Labs   Lab 04/04/19  0330     104  104   CALCIUM 11.4*  11.4*  11.4*   ALBUMIN 3.0*  3.0*  3.0*   PROT 4.8*     136  136   K 4.0  4.0  4.0   CO2 21*  21*  21*     105  105   BUN 13  13  13   CREATININE 1.1  1.1  1.1   ALKPHOS 179*   *   *   BILITOT 16.4*     Coagulation:   Recent Labs   Lab 04/04/19  0330 04/04/19  0554   INR 1.7*  --    APTT  --  59.3*     Lactic Acid: No results for input(s): LACTATE in the last 48 hours.  All pertinent labs from the last 24 hours have been reviewed.    Significant Diagnostics:  I have reviewed all pertinent imaging results/findings within the past 24 hours.   CT head shows large right occipital ischemic stroke.    EEG results pending     ROS

## 2019-04-04 NOTE — PROGRESS NOTES
Patient remains on VA ECMO, RPM at 4500 FiO2 100% Sweep 7 with a flow of 5lpm. Patient is undergoing a 24 hour EEG. There was a run of Vtach that required cardioversion during the night. Patient was succesffully converted back to a Normal Sinus Rhythm. CO2 parameters are 30-35 PTT 50-70 Hgb >9 and platelets >50.     Heparin rate increased through the night from 14 to 15.

## 2019-04-04 NOTE — PROCEDURES
ICU EEG/VIDEO MONITORING REPORT    Subhash Ac  [id number]  1975    DATE OF SERVICE: 4/3-4/19    DATE OF ADMISSION: 3/29/2019 12:39 AM    ADMITTING PROVIDER: Harley Samuel MD    REASON FOR CONSULT: 44yo M admitted with STEMI    METHODOLOGY   Electroencephalographic (EEG) recording is with electrodes placed according to the International 10-20 placement system.  Thirty two (32) channels of digital signal are simultaneously recorded from the scalp and may include EKG, EMG, and/or eye monitors.   Recording band pass was 0.1 to 512 hz.  Digital video recording of the patient is simultaneously recorded with the EEG.  The nursing staff report clinical symptoms and may press an event button when the patient has symptoms of clinical interest to the treating physicians.  EEG and video recording is stored and archived in digital format.  The entire recording is visually reviewed and the times identified by computer analysis as being spikes or seizures are reviewed again.  Activation procedures which include photic stimulation, hyperventilation and instructing patients to perform simple task are done in selected patients.   Compresses spectral analysis (CSA) is also performed on the activity recorded from each individual channel.  This is displayed as a power display of frequencies from 0 to 30 Hz over time.   The CSA analysis is done and displayed continuously.  This is reviewed for asymmetries in power between homologous areas of the scalp and for presence of changes in power which canbe seen when seizures occur.  Sections of suspected abnormalities on the CSA is then compared with the original EEG recording.     [employer] software was also utilized in the review of this study.  This software suite analyzes the EEG recording in multiple domains.  Coherence and rhythmicity is computed to identify EEG sections which may contain organized seizures.  Each channel undergoes analysis to detect presence of spike and sharp  waves which have special and morphological characteristic of epileptic activity.  The routine EEG recording is converted from spacial into frequency domain.  This is then displayed comparing homologous areas to identify areas of significant asymmetry.  Algorithm to identify non-cortically generated artifact is used to separate eye movement, EMG and other artifact from the EEG.      Recording Times  Start on 4/3/19, 17:11  Stop on 4/4/19, 13:09    A total of 19 hours and 58 minutes of EEG was recorded.    EEG FINDINGS  Background activity:   The background rhythm was characterized by delta (1-3 Hz) activity that becomes rhythmic at times (GRDA)  No posterior dominant rhythm seen.   Symmetry and continuity: the background was continuous and symmetric        Sleep:   Not seen.    Activation procedures:   Not done    Abnormal activity:   No epileptiform discharges, periodic discharges, lateralized rhythmic delta activity or electrographic seizures were seen.    Intermittent electrode artifact related to tube-movements are noted.    IMPRESSION:   This is an abnormal C-EEG due to the severe generalized slowing seen, suggestive of severe diffuse or multifocal cerebral dysfunction.  No epileptiform activity or electrographic seizures seen.    CLINICAL CORRELATION IS RECOMMENDED.    Myranda Holland MD, BISHOP(), REINA POZO.  Neurology-Epilepsy.  [facility].

## 2019-04-04 NOTE — PROGRESS NOTES
Dr. Mcmahan with neurology at bedside with RN, pt's spouse, father and step mother. Dr. Mcmahan discussed with family pt's EEG findings and pt's poor prognosis. Emotional support provided to family. All questions encouraged and answered. Will continue to monitor closely.
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
## 2019-04-04 NOTE — NURSING
CTS resident at bedside. EP fellow called to bedside for similar VT event.     @ 0555 Pt in sustained VT HR 190s.  Repeat Lidocaine 100mg bolus given. Repeat Amiodarone bolus. EP adjusted TVP which was unsuccessful. Pt shocked with synchronized 200j x2  But was unsuccessful. HR now in 150s. A third synchronized 200j shock x1 was then given, successfully converted to NSR, TVP setting HR 60 BPM, 25mA per EP fellow, Dr. Avery. Awaiting Lidocaine level from 4/3. New level sent off this am. Pt on amio 1mg/hr, epi increased to 0.06mcg/kg/min.

## 2019-04-04 NOTE — SIGNIFICANT EVENT
Per Dr. Avery, patient noted to be in slow V-Tach at a rate of 145bpm, patient synchronized with 200 joules, successfully and HR noted to be 77bpm. Vitals, flows stable. Will monitor patient.

## 2019-04-04 NOTE — ASSESSMENT & PLAN NOTE
43yoM admitted w STEMI underwent PCI to LM/LAD and developed cardiogenic shock, briefly on IABP, followed by Impella CP and high doses of epinephrine. Eventually progressing to multiple organ failure requiring Tandem Heart VAD with VA-ECMO on 03/29//19. Further complicated by developing ALI of the LLE s/p urgent Left AKA with CTS on 03/30/19. Since amputation improvement of his hemodynamic status with adequate ECMO flows ~ 5L and speed 4300. Clearing Lactic acid, LFTs and CPK down trending. Remained intubation, off sedation in the morning to evaluate for neurological recovery.   - CTS primary  - HTS will follow along, please call for any questions or concerns  - Wean off epinephrine  - Recommend continued optimization from his Cardiogenic shock standpoint  - Agree with holding sedation to evaluation neurological status  - Monitor Lactic, CMP, and CPK until normalizes   - Wean off Vent support as tolerated   - F/U lido levels  - F/U neuro and EEG    Seen w Dr Duval

## 2019-04-04 NOTE — PROGRESS NOTES
[facility]  Cardiothoracic Surgery  Progress Note    Patient Name: Subhash Ac  MRN: [id number]  Admission Date: 3/29/2019  Hospital Length of Stay: 6 days  Code Status: Full Code   Attending Physician: Beriln Calderon MD   Referring Provider: Self, Aaareferral  Principal Problem:ST elevation myocardial infarction (STEMI)            Subjective:     Post-Op Info:  Procedure(s) (LRB):  LEFT ABOVE KNEE AMPUTATION REMOVAL OF IMPELLA 5.0 (Left)  FASCIOTOMY 3 COMPARTMENT (Left)  REPAIR, ARTERY, FEMORAL  AMPUTATION, ABOVE KNEE   5 Days Post-Op     Interval History:   ECMO speed @ 4500, no flow issues on total support  Vtach overnight and this morning at approx 0300 and 0600, returned to NSR after Lido, Amio boluses, Cardioversion x 2 and Cardioversion x 3.   Returned to V tach 1.5hrs afterwards, withholding more shocks for now as it is not impacting his flows.   Lido off this morning   Epi 0.06  amio 1  Hep 1500  tBili 13.7    Medications:  Continuous Infusions:   sodium chloride 0.9% 200 mL/hr at 04/03/19 1902    amiodarone in dextrose 5% 1 mg/min (04/04/19 0800)    dextrose 5 % and 0.9 % NaCl with KCl 20 mEq 5 mL/hr (03/29/19 2119)    epinephrine infusion 0.06 mcg/kg/min (04/04/19 0800)    heparin (porcine) in 5 % dex 1,500 Units/hr (04/04/19 0800)    lidocaine Stopped (04/03/19 1110)    nicardipine Stopped (04/04/19 0600)     Scheduled Meds:   aspirin  81 mg Oral Daily    calcium chloride  1 g Intravenous Once    chlorhexidine  15 mL Mouth/Throat BID    clopidogrel  75 mg Oral Daily    fluconazole (DIFLUCAN) IVPB  400 mg Intravenous Q24H    hydrocortisone sodium succinate  100 mg Intravenous Q8H    pantoprazole  40 mg Intravenous BID    polyethylene glycol  17 g Oral Daily    rifAXImin  550 mg Per G Tube BID    vancomycin (VANCOCIN) IVPB  1,000 mg Intravenous Q24H     PRN Meds:sodium chloride, sodium chloride, sodium chloride, sodium chloride, acetaminophen, acetaminophen,  bisacodyl, dextrose 50%, glucagon (human recombinant), insulin aspart U-100, magnesium sulfate IVPB, ondansetron, ondansetron, oxyCODONE, oxyCODONE     Objective:     Vital Signs (Most Recent):  Temp: 98.7 °F (37.1 °C) (04/04/19 0700)  Pulse: (!) 205 (04/04/19 0815)  Resp: (!) 42 (04/04/19 0815)  BP: (!) 80/68 (04/03/19 0610)  SpO2: 100 % (04/04/19 0815) Vital Signs (24h Range):  Temp:  [97.6 °F (36.4 °C)-99.3 °F (37.4 °C)] 98.7 °F (37.1 °C)  Pulse:  [] 205  Resp:  [19-46] 42  SpO2:  [91 %-100 %] 100 %  Arterial Line BP: (56-94)/(48-82) 76/66     Weight: 106 kg (233 lb 11 oz)  Body mass index is 34.51 kg/m².    SpO2: 100 %  O2 Device (Oxygen Therapy): ventilator    Intake/Output - Last 3 Shifts       04/02 0700 - 04/03 0659 04/03 0700 - 04/04 0659 04/04 0700 - 04/05 0659    I.V. (mL/kg) 7317.3 (69) 7390 (69.7)     Blood 605 916     NG/ 20     IV Piggyback  95     Total Intake(mL/kg) 8092.3 (76.3) 8421 (79.4)     Other 86615 78726 788    Blood 9 18     Total Output 82492 76508 788    Net -2788.7 -2137 -788                 Lines/Drains/Airways     Central Venous Catheter Line                 ECMO Cannula 03/29/19 1800 right femoral artery 5 days         ECMO Cannula 03/29/19 1800 right internal jugular 5 days         Introducer 03/29/19 2015 left femoral vein 5 days         Percutaneous Central Line Insertion/Assessment - triple lumen  03/29/19 2015 left internal jugular 5 days         Trialysis (Dialysis) Catheter 03/29/19 2015 left internal jugular 5 days          Drain                 NG/OG Tube 03/29/19 0300 Center mouth 6 days          Airway                 Airway - Non-Surgical 03/29/19 0223 Endotracheal Tube 6 days          Arterial Line                 Arterial Line 03/29/19 2015 Right Radial 5 days          Line                 Pacer Wires 03/29/19 2015 5 days          Peripheral Intravenous Line                 Peripheral IV - Single Lumen 04/02/19 1345 Right Wrist 1 day                Physical  Exam   Constitutional:   Intubated.   Off sedation- no appreciated neuro exam   HENT:   Head: Normocephalic and atraumatic.   Cardiovascular:   RRR. Patient connected to ECMO circuit    Pulmonary/Chest: No respiratory distress.   Intubated, PEEP minimal vent settings now that on ECMO   Abdominal: He exhibits no distension.   Genitourinary:   Genitourinary Comments: Trujillo in place   Musculoskeletal:      L AKA stump intact. Prevena wound vac in place    Skin:   Skin is cool to touch       Significant Labs:  ABGs:   Recent Labs   Lab 04/04/19  0804   PH 7.346*   PCO2 49.0*   PO2 382*   HCO3 26.7   POCSATURATED 100   BE 1     CBC:   Recent Labs   Lab 04/04/19  0330 04/04/19  0804   WBC 16.18*  --    RBC 3.33*  --    HGB 9.4*  --    HCT 29.0* 31*   PLT 59*  --    MCV 87  --    MCH 28.2  --    MCHC 32.4  --      CMP:   Recent Labs   Lab 04/04/19  0330     104  104   CALCIUM 11.4*  11.4*  11.4*   ALBUMIN 3.0*  3.0*  3.0*   PROT 4.8*     136  136   K 4.0  4.0  4.0   CO2 21*  21*  21*     105  105   BUN 13  13  13   CREATININE 1.1  1.1  1.1   ALKPHOS 179*   *   *   BILITOT 16.4*     Coagulation:   Recent Labs   Lab 04/04/19  0330 04/04/19  0554   INR 1.7*  --    APTT  --  59.3*     Lactic Acid: No results for input(s): LACTATE in the last 48 hours.  All pertinent labs from the last 24 hours have been reviewed.    Significant Diagnostics:  I have reviewed all pertinent imaging results/findings within the past 24 hours.   CT head shows large right occipital ischemic stroke.    EEG results pending     ROS        Assessment/Plan:     * ST elevation myocardial infarction (STEMI)  Neuro:   - Intubated   -Sedation off, no gag, no reaction to pain, Pupils still equal and reactive   - on continuous EEG per Neurology  - Head CT shows large ischemic right occipital stroke; high risk for hemorrhagic conversion given heparin infusion  - Off nimbex   - no pain medications     Pulmonary:    - Intubated, ventilator for lung support only on ECMO  Vent Mode: A/C  Oxygen Concentration (%):  [40-99] 40  Resp Rate Total:  [21 br/min-47 br/min] 41 br/min  Vt Set:  [300 mL] 300 mL  PEEP/CPAP:  [5 cmH20] 5 cmH20  Mean Airway Pressure:  [12 weB73-54 cmH20] 14 cmH20  - ECMO running- started overnight 3/29  - Daily CXRs while intubated  - ABGs PRN  - BTs, DuoNebs     Cardiac:  On full ECMO support without pulse pressures. Vtach overnight and this morning at approx 0300 and 0600, returned to NSR after Lido, Amio boluses, Cardioversion x 2 and Cardioversion x 3.   Returned to V tach 1.5hrs afterwards, withholding more shocks for now as it is not impacting his flows.   - Impella removed in OR 3/30  - Epi 0.06  - Lido off due to toxicity, using bolus PRN for Vtach  - pump speed @ 4500  - Start cardene to keep MAPs <80  - Monitor chest tube output  - Anticoagulation: Heparin @ 1500 this morning, PTT goal 40-70      - bradycardic this am; followed by EP    Renal:    - Anuric on CRRT   - Trend BUN/Cr  - Nephrology following      ID:   - Afebrile   - Ischemic LLE- s/p L AKA on 3/30   - Leukocytosis stable  - lactate steady  - Trend WBC daily  - Ancef, diflucan, vanc     Hem/Onc:   - H/H stable  - Target Plts >50, transfuse PRN   - heparin gtt with PTT goal 50-70     Endocrine:    - Insulin gtt for glucose control  - Endocrine consulted, appreciate assistance     Fluids/Electrolytes/Nutrition/GI:   - NPO  - Replace electrolytes PRN  - bicarb bath with crrt   -replaced calcium  -Patient with shock liver- with AST and ALT elevated but downtrending  - tBili rising, increased ECMO flows to potentially treat congestive hepatopathy     PPx:  - DVT: heparin gtt, SCDs     DISPO:  - Continue SICU care. Poor prognosis. Discussed with the patient's father and wife this morning regarding current interventions and lack of improvement. They are aware and reasonable with respect to goals of care; however, they are most concerned with  neurologic status and expected progress. I will continue discussions with them today after Neurology has a chance to re-assess and read EEG findings                Cardiogenic shock  42yo male with STEMI who is now s/p LM stent and Impella 3.5 placement.  He was loaded with Brilinta.  In my discussion with the interventional cardiology fellow, his MAP was initially in the 50s on Impella support.  It is now in the high 80s and remains there with weaning of norepinephrine.  Would recommend sodium bicarbonate (2-3 amps) for pH of 7.1.  A more normal pH will help his vasopressors function.  In setting of pulmonary edema, would recommend starting nitric oxide support (20-40ppm).  Would recommend central line to check CVP and would recommend echocardiogram.  Inotropic doses of epinephrine and nitric oxide would support his right heart as well.  For now would continue with aggressive medical management and I think he could get adequate support from the Impella.  Discussed with Dr. Calderon.        Miles Coley MD  Cardiothoracic Surgery  [facility]

## 2019-04-04 NOTE — SIGNIFICANT EVENT
Pt noted to be in sustained V-tach at a rate of 200 bpm. Flows on ECMO stable, arterial waveform noted with suitable blood pressure. Dr. Peck and Dr Avery (EP Fellow) to bedside to assess patient. Calcium chloride 1gm, Mg sulfate 2gm, potassium chloride 40 mEq and Amiodarone 150 mg rebolus ordered. Patient synchronized with 200 joules x 2 and heart rate decreased to 140 bpm.

## 2019-04-04 NOTE — CONSULTS
[facility]  Palliative Medicine  Consult Note    Patient Name: Subhash Ac  MRN: [id number]  Admission Date: 3/29/2019  Hospital Length of Stay: 6 days  Code Status: Full Code   Attending Provider: Berlin Calderon MD  Consulting Provider: AVELINO Soni  Primary Care Physician: Primary Doctor No  Principal Problem:ST elevation myocardial infarction (STEMI)    Patient information was obtained from primary team  and ER records.      Inpatient consult to Palliative Care  Consult performed by: AVELINO Bunch  Consult ordered by: Miles Coley MD  Reason for consult: goals of care   Assessment/Recommendations: Palliative care consult received.  Chart reviewed and patient discussed with Dr. Coley as directed by Dr. Calderon.   Full consult to follow.   Thank you for consult and opportunity to participate in Mr. Ac's care.  POONAM Field, ACNS-BC, OCN   Palliative Medicine   Ext [phone]
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
## 2019-04-04 NOTE — PROGRESS NOTES
1225: Pt in and out of vtach -240bpm. MAP on arterial line high 50s. ECMO flows stable. Dr. Coley notified. MD gave verbal order to administer lidocaine 100mg bolus and reinitiate continuous gtt at 2mg/min.     1230: Pt spontaneously converted to NSR HR 70s. MAP back to baseline of high 70s. Per Dr. Coley, do not administer Lidocaine bolus but do restart Lidocaine gtt continuously at 2mg/min.     1245: Pt back in Vtach - 240bpm. Hypotensive with MAP mid-high 50s. ECMO flows stable. Lidocaine 100mg IVP administered.     1249: Converted to NSR HR 78. MAP back to baseline of high 70s.

## 2019-04-04 NOTE — SUBJECTIVE & OBJECTIVE
Interval History:   Still intubated on VA ECMO, on epi  Had several episodes of sustained MMVT over night with proper function of ECMO and got DCCV, amiodarone boluses and lido boluses multiple times (see notes of Dr Avery)  Has gag and pupillary reflexes    Continuous Infusions:   amiodarone in dextrose 5% 1 mg/min (04/04/19 1200)    epinephrine infusion 0.04 mcg/kg/min (04/04/19 1200)    heparin (porcine) in 5 % dex 1,500 Units/hr (04/04/19 1200)    lidocaine Stopped (04/03/19 1110)    nicardipine Stopped (04/04/19 1220)    vasopressin (PITRESSIN) infusion       Scheduled Meds:   aspirin  81 mg Oral Daily    calcium chloride IVPB  1 g Intravenous Once    chlorhexidine  15 mL Mouth/Throat BID    clopidogrel  75 mg Oral Daily    fluconazole (DIFLUCAN) IVPB  400 mg Intravenous Q24H    hydrocortisone sodium succinate  100 mg Intravenous Q8H    lidocaine HCL 20 mg/ml (2%)  100 mg Other Once    magnesium sulfate IVPB  2 g Intravenous Once    pantoprazole  40 mg Intravenous BID    polyethylene glycol  17 g Oral Daily    potassium chloride in water  40 mEq Intravenous Once    rifAXImin  550 mg Per G Tube BID    vancomycin (VANCOCIN) IVPB  1,000 mg Intravenous Q24H     PRN Meds:sodium chloride, sodium chloride, acetaminophen, acetaminophen, bisacodyl, dextrose 50%, glucagon (human recombinant), insulin aspart U-100, ondansetron, ondansetron, oxyCODONE, oxyCODONE    Review of patient's allergies indicates:  No Known Allergies  Objective:     Vital Signs (Most Recent):  Temp: 98.3 °F (36.8 °C) (04/04/19 1145)  Pulse: 78 (04/04/19 1200)  Resp: (!) 45 (04/04/19 1200)  BP: (!) 80/68 (04/03/19 0610)  SpO2: 96 % (04/04/19 1200) Vital Signs (24h Range):  Temp:  [97.6 °F (36.4 °C)-98.7 °F (37.1 °C)] 98.3 °F (36.8 °C)  Pulse:  [] 78  Resp:  [27-52] 45  SpO2:  [96 %-100 %] 96 %  Arterial Line BP: (56-94)/(48-82) 89/75     No data found.  Body mass index is 34.51 kg/m².      Intake/Output Summary (Last 24  hours) at 4/4/2019 1236  Last data filed at 4/4/2019 1200  Gross per 24 hour   Intake 6468 ml   Output 82728 ml   Net -3823 ml       Physical Exam   Constitutional:   Cool to touch. Intubated and paralyzed   HENT:   Head: Normocephalic and atraumatic.   Cardiovascular:   Patient connected to ECMO circuit    Pulmonary/Chest: No respiratory distress. He exhibits no tenderness.   Intubated   Abdominal: He exhibits no distension. There is no tenderness. There is no guarding.   Genitourinary:   Genitourinary Comments: Trujillo in place   Musculoskeletal:   Paralyzed.   LLE cool to touch. Anterior shin modeled    Skin:   Skin is cool to touch   Psychiatric:   sedated       Significant Labs:  CBC:  Recent Labs   Lab 04/04/19  0330 04/04/19  0803 04/04/19  0804 04/04/19  1200   WBC 16.18* 13.45*  --  14.24*   RBC 3.33* 2.84*  --  3.30*   HGB 9.4* 8.1*  --  9.3*   HCT 29.0* 25.6* 31* 29.3*   PLT 59* 44*  --  87*   MCV 87 90  --  89   MCH 28.2 28.5  --  28.2   MCHC 32.4 31.6*  --  31.7*     BNP:  Recent Labs   Lab 03/29/19  0052   *     CMP:  Recent Labs   Lab 04/03/19  2345 04/04/19  0330 04/04/19  0803   GLU 95 104  104  104 88   CALCIUM 10.7* 11.4*  11.4*  11.4* 8.2*   ALBUMIN 3.2* 3.0*  3.0*  3.0* 2.4*   PROT 5.0* 4.8* 3.8*    136  136  136 139   K 3.9 4.0  4.0  4.0 3.3*   CO2 23 21*  21*  21* 19*    105  105  105 114*   BUN 13 13  13  13 10   CREATININE 1.2 1.1  1.1  1.1 0.7   ALKPHOS 194* 179* 142*   * 709* 524*   * 694* 488*   BILITOT 16.6* 16.4* 14.0*      Coagulation:   Recent Labs   Lab 04/03/19  1813  04/03/19  2345 04/04/19  0100 04/04/19  0330 04/04/19  0554 04/04/19  0803   INR  --    < > 1.6*  --  1.7*  --  1.9*   APTT 46.4*  --   --  52.5*  --  59.3*  --     < > = values in this interval not displayed.     LDH:  Recent Labs   Lab 04/02/19  0345 04/03/19  0459 04/04/19  0330   LDH 3,206* 2,380* 1,906*     Microbiology:  Microbiology Results (last 7 days)      Procedure Component Value Units Date/Time    Blood culture [[id number]]     Order Status:  Canceled Specimen:  Blood     Blood culture [[id number]]     Order Status:  Canceled Specimen:  Blood           I have reviewed all pertinent labs within the past 24 hours.    Estimated Creatinine Clearance: 163.2 mL/min (based on SCr of 0.7 mg/dL).    Diagnostic Results:  I have reviewed all pertinent imaging results/findings within the past 24 hours.

## 2019-04-04 NOTE — PROGRESS NOTES
Patient received on VA ECMO this morning.  17FR Arterial catheter and 31FR Venous catheter.  Catheter placement was measured and confirmed.    Patient had episodes of V-Tach throughout the shift.    Patient's family has decided to withdraw care due to poor outcome of this critical illness.

## 2019-04-04 NOTE — PROGRESS NOTES
Dr. Virk at bedside for rounds. MD aware of VS, gtts, ecmo flows, neuro status, and overall pt status. Dr. Virk and ecmo technician decreased  ecmo RPMs to assess underlying BP. MAPs decreased quickly to 40s, tachypneic in low 50s, Sp02 mid 80s. Ecmo settings returned to prior settings. Plan of care will be to d/c Epi, begin Vaso gtt and obtain head CT. Per Dr. Virk, ok to administer lidocaine IVP in event of Vtach during transport to CT. Will carry out all orders and continue to monitor closely.

## 2019-04-04 NOTE — PROGRESS NOTES
1806: Ventilator and continuous gtts turned off per spouse's request. Pt's family at bedside. Morphine and ativan given for comfort. Emotional support provided to all.     1809: weaning of ECMO by ECMO technician.     1813: Pt alarming asystole on bedside monitor. No pulse appreciated, no respirations, no heart tones and pupils fixed. Dr. Coley notified and MD to come to bedside to pronounce pt. Emotional support provided to family.

## 2019-04-04 NOTE — PROGRESS NOTES
[facility]  Cardiac Electrophysiology  Progress Note    Admission Date: 3/29/2019  Code Status: Full Code   Attending Physician: Berlin Calderon MD   Expected Discharge Date: 4/10/2019  Principal Problem:ST elevation myocardial infarction (STEMI)    Subjective:     Interval History: Overnight patient had two episodes of MMVT with HR in 200s. Patient was given lidocaine boluses as well as amiodarone boluses and amiodarone drip increased to 1 m/ghr. Attempted to ATP the arrthymia with TVP which was unsuccessful. He was given synchronized 200J x2 not able to convert but developed slow MMVT HR 150s that was followed by synchronized 200J x1 which successfully converted to NSR. This morning at 7:30 a.m., pt went into MMVT again but was hemodynamically stable with ECMO. Primary team requesting that no further cardioversion or boluses of anti-arrthymic given as awaiting goals of care and family discussion. Of note, awaiting lidocaine level, being processed in the lab.     Review of Systems   Unable to perform ROS: acuity of condition     Objective:     Vital Signs (Most Recent):  Temp: 98.7 °F (37.1 °C) (04/04/19 0700)  Pulse: (!) 205 (04/04/19 0900)  Resp: (!) 42 (04/04/19 0900)  BP: (!) 80/68 (04/03/19 0610)  SpO2: 100 % (04/04/19 0900) Vital Signs (24h Range):  Temp:  [97.6 °F (36.4 °C)-99.3 °F (37.4 °C)] 98.7 °F (37.1 °C)  Pulse:  [] 205  Resp:  [27-46] 42  SpO2:  [96 %-100 %] 100 %  Arterial Line BP: (56-94)/(48-82) 74/65     Weight: 106 kg (233 lb 11 oz)  Body mass index is 34.51 kg/m².     SpO2: 100 %  O2 Device (Oxygen Therapy): ventilator    Physical Exam   HENT:   ETT in place   Cardiovascular: Tachycardic.   VA ECMO circuit in place   Pulmonary/Chest:   Rhonchi throughout lung fields   Abdominal: Soft. He exhibits no distension. There is no tenderness.   Musculoskeletal:   Left AKA    Neurological:   Grimacing to pain    Skin: Skin is warm.     Significant Labs: All pertinent lab  results from the last 24 hours have been reviewed.    Significant Imaging: Echocardiogram:   Transthoracic echo (TTE) complete (Cupid Only):   Results for orders placed or performed during the hospital encounter of 03/29/19   Transthoracic echo (TTE) complete (Cupid Only)   Result Value Ref Range    STJ 2.41 cm    IVS 1.06 0.6 - 1.1 cm    LA size 4.02 cm    Left Atrium Major Axis 3.96 cm    Left Atrium Minor Axis 4.79 cm    LVIDD 5.18 3.5 - 6.0 cm    LVIDS 4.53 (A) 2.1 - 4.0 cm    LVOT diameter 2.03 cm    PW 1.02 0.6 - 1.1 cm    MV Peak A Angel 0.26 m/s    E wave decelartion time 81.67 msec    MV Peak E Angel 0.31 m/s    RA Major Axis 3.43 cm    RA Width 2.04 cm    RVDD 2.47 cm    Sinus 2.98 cm    TAPSE 1.06 cm    LA WIDTH 3.00 cm    LV Diastolic Volume 128.37 mL    LV Systolic Volume 93.99 mL    FS 13 %    LA volume 44.44 cm3    LV mass 203.33 g    Left Ventricle Relative Wall Thickness 0.39 cm    E/A ratio 1.19     LVOT area 3.23 cm2    LV Systolic Volume Index 43.0 mL/m2    LV Diastolic Volume Index 58.67 mL/m2    LA Volume Index 20.3 mL/m2    LV Mass Index 92.9 g/m2    BSA 2.25 m2     Assessment and Plan:     VT (ventricular tachycardia)  -in the setting of transmural infarct with likely cause from fixed cardiac scar tissue from STEMI  -lidocaine drip discontinued given concern for lidocaine toxicity, awaiting serum level  -c/w amiodarone drip at 1 mg/min   -wean off iontropes/pressors as tolerated   -c/w current pacer settings  -monitor electrolytes and replete as needed, goal potassium >4 and magnesium >2  -keep defibrillator pads on patient  -c/w to monitor on telemetry   -overall patient's prognosis is guarded, primary team to discuss goals of care with patient's family     Cami Delaney MD  Cardiac Electrophysiology  [facility]

## 2019-04-04 NOTE — ASSESSMENT & PLAN NOTE
43 y.o. male s/p STEMI and taken for [facility] w PCI to LM-LAD, IABP placement and immediate removal followed by placement of Impella CP. Patient then placed on Tandem heart VAD with ECMO on 3/29.  Patient taken back to OR for left AKA 3/30 with removal of impella.    Neuro:   - remains intubated, off sedation and paralytic   - no peripheral withdrawal to pain, not following commands, no longer grimacing  - family discussions per CTS  - neurology evaluation pending with EEG  - CT demonstrated recent right PCA infarct 4/1      Pulmonary:   - Intubated  - ECMO running  - Daily CXRs while intubated  - ABGs PRN  - BTs, DuoNebs    Cardiac:   - 3/28-3/29: code STEMI and taken for [facility] w PCI to LM-LAD, IABP placement and immediate removal followed by placement of Impella CP. Continued heart failure then led to placement of Tandem heart VAD with ECMO.  Impella removed.  - Epi, Norepi, Vaso gtt's, currently only on Epi at 0.06  - Wean pressors per CTS  - Monitor chest tube output  - Anticoagulation per CTS    Renal:    - Anuric on CRRT   - Trend BUN/Cr  - Nephrology on board    ID:   - afebrile   - Leukocytosis 26 postop, today 16  - Trend WBC daily  - post op ancef completed  - continuing on fluconazole  - started on vanc 4/2    Hem/Onc:   - H/H stable  - given 1 unit plts yesterday  - heparin gtt    Endocrine:    - Insulin gtt for glucose control  - Endocrine consulted, appreciate assistance    Fluids/Electrolytes/Nutrition/GI:   - Replace electrolytes PRN  - mIVF per CTS  - Currently NPO, diet per CTS  - T bili at 16    PPx:  - DVT: heparin gtt, SCDs  - PUP:  Protonix    DISPO:  - Continue SICU care  - Goals of care discussion per primary    Primary: CTS

## 2019-04-04 NOTE — SUBJECTIVE & OBJECTIVE
Interval History:   Continues on SLED this morning, net negative ~2L/24h.  Pressor requirements stable.  Requiring PRN infusions of KCL/CaCl for repletion despite being on SLED.  Possible withdrawal of care today.    Review of patient's allergies indicates:  No Known Allergies  Current Facility-Administered Medications   Medication Frequency    0.9%  NaCl infusion (for blood administration) Q24H PRN    0.9%  NaCl infusion (for blood administration) Q24H PRN    acetaminophen tablet 650 mg Q4H PRN    acetaminophen tablet 650 mg Q6H PRN    amiodarone 360 mg/200 mL (1.8 mg/mL) infusion Continuous    aspirin chewable tablet 81 mg Daily    bisacodyl suppository 10 mg Q12H PRN    calcium chloride 1 g in dextrose 5 % 100 mL IVPB Once    chlorhexidine 0.12 % solution 15 mL BID    clopidogrel tablet 75 mg Daily    dextrose 50% injection 12.5 g PRN    EPINEPHrine (ADRENALIN) 10 mg in sodium chloride 0.9% 250 mL infusion Continuous    fluconazole (DIFLUCAN) IVPB 400 mg 200 mL Q24H    glucagon (human recombinant) injection 1 mg PRN    heparin 25,000 units in dextrose 5% 250 mL (100 units/mL) infusion (heparin infusion) Continuous    hydrocortisone sodium succinate injection 100 mg Q8H    insulin aspart U-100 pen 0-5 Units Q6H PRN    lidocaine (cardiac) injection 100 mg Once    lidocaine 2000 mg in dextrose 5% 250 mL infusion Continuous    lidocaine HCL 20 mg/ml (2%) injection 100 mg Once    magnesium sulfate 2g in water 50mL IVPB (premix) Once    niCARdipine 40 mg/200 mL infusion Continuous    ondansetron disintegrating tablet 8 mg Q8H PRN    ondansetron injection 4 mg Q12H PRN    oxyCODONE immediate release tablet 5 mg Q4H PRN    oxyCODONE immediate release tablet Tab 10 mg Q4H PRN    pantoprazole injection 40 mg BID    polyethylene glycol packet 17 g Daily    rifAXIMin tablet 550 mg BID    vancomycin in dextrose 5 % 1 gram/250 mL IVPB 1,000 mg Q24H    vasopressin (PITRESSIN) 0.2 Units/mL in  dextrose 5 % 100 mL infusion Continuous       Objective:     Vital Signs (Most Recent):  Temp: 98.3 °F (36.8 °C) (04/04/19 1145)  Pulse: 78 (04/04/19 1300)  Resp: (!) 46 (04/04/19 1300)  BP: (!) 80/68 (04/03/19 0610)  SpO2: 95 % (04/04/19 1300)  O2 Device (Oxygen Therapy): ventilator (04/04/19 1300) Vital Signs (24h Range):  Temp:  [97.6 °F (36.4 °C)-98.7 °F (37.1 °C)] 98.3 °F (36.8 °C)  Pulse:  [] 78  Resp:  [27-60] 46  SpO2:  [95 %-100 %] 95 %  Arterial Line BP: (56-94)/(48-82) 83/68     Weight: 106 kg (233 lb 11 oz) (03/31/19 0500)  Body mass index is 34.51 kg/m².  Body surface area is 2.27 meters squared.    I/O last 3 completed shifts:  In: 48825.3 [I.V.:16476.3; Blood:1166; NG/GT:130; IV Piggyback:95]  Out: 07127 [Other:50187; Blood:21]    Physical Exam   Constitutional: He appears ill. He is intubated.   HENT:   Head: Normocephalic and atraumatic.   Eyes: Right eye exhibits no discharge. Left eye exhibits no discharge.   Cardiovascular:   RRR. Patient connected to ECMO circuit    Pulmonary/Chest: He is intubated. No respiratory distress.   Abdominal: He exhibits no distension.   Genitourinary:   Genitourinary Comments: [place] in place   Musculoskeletal:   Left above knee amputation    Skin: Skin is warm.       Significant Labs:  ABGs:   Recent Labs   Lab 04/04/19  1113   PH 7.457*   PCO2 31.7*   HCO3 22.4*   POCSATURATED 99   BE -2     CBC:   Recent Labs   Lab 04/04/19  1200   WBC 14.24*   RBC 3.30*   HGB 9.3*   HCT 29.3*   PLT 87*   MCV 89   MCH 28.2   MCHC 31.7*     CMP:   Recent Labs   Lab 04/04/19  0803   GLU 88   CALCIUM 8.2*   ALBUMIN 2.4*   PROT 3.8*      K 3.3*   CO2 19*   *   BUN 10   CREATININE 0.7   ALKPHOS 142*   *   *   BILITOT 14.0*

## 2019-04-04 NOTE — SIGNIFICANT EVENT
Progress Note    Called to bedside by nursing due to patient noted to be unresponsive. Withdrawal of care and comfort care measures previously initiated. ECMO flows taken down. Upon examination, patient noted to be unresponsive to physical and verbal stimuli. No heart tones auscultated. No pulse appreciated. No spontaneous respirations. Pupils fixed and dilated. Patient pronounced . All questions answered for family at bedside. Condolences offered.    Time of death:       Miles Coley MD   Pager: [phone]  General Surgery PGY-II  [facility]

## 2019-04-04 NOTE — ASSESSMENT & PLAN NOTE
Anuric JACOBY 2/2 Ischemic ATN from Cardiogenic Shock    44 yo male presenting to hospital with chief complaint of SOB/CP found to have STEMI.  Taken urgently to cathlab, found to have 100% occlusion to LM, now s/p PCI with RAQUEL.  Impella was placed for cardiogenic shock, noted to be in biventricular failure.  CTS/IC evaluation for possible Tandem placement.    Plan/Recommendations:  - Will continue with SLED for clearance and volume assistance,  cc/hr as tolerated by patient maintain MAP> 65. Bath adjusted to chem.   - Has been NET negative 2L yesterday  - Continues on ECMO  - Will follow closely  - Strict I/Os and chart  -CaCl IVPB 1 gm IVPB x 1   -KCL 40meq IVPB x 1  -continue high K/high Ca bath

## 2019-04-04 NOTE — CARE UPDATE
Palliative care APRN returned to bedside. Patient's mother has arrived.  Palliative care APRN explained palliative care's roll in her son's care.  She has marginal to good understanding of current clinical condition.  This APRN explained that despite numerous critical care interventions, he is not improving and there is little chance that he will.  Gently explained that a decision was made to withdraw life support. Full explanation of withdrawal provided.  Assured the patient's mother that he would not suffer.    She is tearful which understandable. She states she wishes it were different and wants to do the best for him.  Mother expressed gratitude for the care BRENNAN has received.    Intense emotional support given.

## 2019-04-04 NOTE — ASSESSMENT & PLAN NOTE
Palliative care consult received.  Palliative care APRN, MJ and medical student met with family - wife Laura,  Father Subhash, step mother Maurisio.  Palliative care introduced to family.  Psychosocial assessment completed per  KAREL Eason LCSW.     Impression:  Mr. Ac is a 44 yo gentleman s/p STEMI with multiple complications: system organ failure remains intubated with VA ECMO, SLED overnight,  Pressor support with epinephrine. Continuous EEG monitoring.  He is unresponsive.   Appears to be in no distress or discomfort   Despite these intensive care  interventions it appears the outcome is Mr. Ac will not survive this critical illness.  Palliative care recommends de-escalating care -  Transition to  comfort care interventions and the withdrawal of life support.      Symptom Management for withdrawal of life support:   Initiate symptom management interventions after patient's mother as visited    Pain/Dyspnea   Morphine 1  mg IVP every 15 minutes as needed for pain/dyspnea  Morphine 1  mg IVP prior to discontinuing ECHMO  Anxiety   Lorazepam 0.5 mg IVP every 30 mins as needed for anxiety  Lorazepam 0.5 mg prior to discontinuing ECHMO     - discontinue vasopressors  - discontinue CRRT     When patient is comfortable and family indicates they are ready   - discontinue ECMO     - Notify  at time of withdrawal  - Alistair Thomas aware.    - There are strained family relationships,  If necessary have security available.     Goals of Care  - Patient has no advanced directives.  Next of kin for medical decisions is wife Laura Oates [phone]   - Family meeting this AM with wife, father, step mother and family .  Family has made decision to withdraw life support.   - Mother will be arriving later today.  She has history of phychiatric illness.  Family reports she will have received some sedation and her care giver will be with her.  Wife will determine who is able to stay at bedside for the  duration.  Please allow for multiple family visitors.   - Withdrawal of life support procedure explained to family.  Palliative care APRN will also explain procedure to patient's mother.   - Intense emotional support provided.     Plan/Recommendations  - Despite these intensive care  interventions it appears the outcome is Mr. Ac will not survive this critical illness.  Palliative care recommends de-escalating care - transitioning to comfort care interventions and the withdrawal of life support.  - See above symptom management recommendations   - Notify palliative care APRN when patient's mother arrives.  Ext [phone]  - Bereavement tray.     Above goals of care and recommendation discussed with primary team.

## 2019-04-04 NOTE — PROGRESS NOTES
0735: Pt in sustained vtach HR 190s-204. BP stable MAP in high 60s-70s. Dr. Coley with CTS called and coming to bedside. No orders given at this time.     0745: Dr. Coley at bedside. Pt still in Vtach, BP stable.  MD gave verbal order to administer 1g calcium chloride IVP and 2g magnesium IVPB. Will carry out and continue to monitor closely.

## 2019-04-04 NOTE — ASSESSMENT & PLAN NOTE
Neuro:   - Intubated   -Sedation off, no gag, no reaction to pain, Pupils still equal and reactive   - on continuous EEG per Neurology  - Head CT shows large ischemic right occipital stroke; high risk for hemorrhagic conversion given heparin infusion  - Off nimbex   - no pain medications     Pulmonary:   - Intubated, ventilator for lung support only on ECMO  Vent Mode: A/C  Oxygen Concentration (%):  [40-99] 40  Resp Rate Total:  [21 br/min-47 br/min] 41 br/min  Vt Set:  [300 mL] 300 mL  PEEP/CPAP:  [5 cmH20] 5 cmH20  Mean Airway Pressure:  [12 tsI24-27 cmH20] 14 cmH20  - ECMO running- started overnight 3/29  - Daily CXRs while intubated  - ABGs PRN  - BTs, DuoNebs     Cardiac:  On full ECMO support without pulse pressures. Vtach overnight and this morning at approx 0300 and 0600, returned to NSR after Lido, Amio boluses, Cardioversion x 2 and Cardioversion x 3.   Returned to V tach 1.5hrs afterwards, withholding more shocks for now as it is not impacting his flows.   - Impella removed in OR 3/30  - Epi 0.06  - Lido off due to toxicity, using bolus PRN for Vtach  - pump speed @ 4500  - Start cardene to keep MAPs <80  - Monitor chest tube output  - Anticoagulation: Heparin @ 1500 this morning, PTT goal 40-70      - bradycardic this am; followed by EP    Renal:    - Anuric on CRRT   - Trend BUN/Cr  - Nephrology following      ID:   - Afebrile   - Ischemic LLE- s/p L AKA on 3/30   - Leukocytosis stable  - lactate steady  - Trend WBC daily  - Ancef, diflucan, vanc     Hem/Onc:   - H/H stable  - Target Plts >50, transfuse PRN   - heparin gtt with PTT goal 50-70     Endocrine:    - Insulin gtt for glucose control  - Endocrine consulted, appreciate assistance     Fluids/Electrolytes/Nutrition/GI:   - NPO  - Replace electrolytes PRN  - bicarb bath with crrt   -replaced calcium  -Patient with shock liver- with AST and ALT elevated but downtrending  - tBili rising, increased ECMO flows to potentially treat congestive  hepatopathy     PPx:  - DVT: heparin gtt, SCDs     DISPO:  - Continue SICU care. Poor prognosis. Discussed with the patient's father and wife this morning regarding current interventions and lack of improvement. They are aware and reasonable with respect to goals of care; however, they are most concerned with neurologic status and expected progress. I will continue discussions with them today after Neurology has a chance to re-assess and read EEG findings

## 2019-04-04 NOTE — PLAN OF CARE
Interval update:     Called to bedside by CT surgery Dr. Peck for sustained MMVT HR 190s. Patient seen. Stable on ECMO with Flows ~ 4.9 and good waveform. Patient on Epi 0.04, Amiodarone 0.5mg/hr, recent dc lidocaine ggt due to toxicity.     Calcium chloride 1gm, Mg sulfate 2gm, potassium chloride 40 and Amiodarone bolus running upon my arrival. Attempted to ATP'd with TVP which was unsuccessful. Pacer pads placed, given synchronized 200j x2 not able to convert but developed slow VT HR 140s BPM. Given Lidocaine 100mg, folloed by synchronized 200j x1, successfully converted to NSR, TVP setting HR 60 BPM, 25mA. Increase Amiodarone 1mg/hr. ECMO flows stable 5 lpm  EKG recommend post DCCV, will follow up.  Discussed with Dr. Cali, agrees with plans above    Marilyn Avery M.D.  Page # [phone]  Cardiovascular Fellow PGY-IV  [facility]

## 2019-04-05 LAB — LIDOCAIN SERPL-MCNC: 7.7 MCG/ML (ref 1.5–5)

## 2019-04-05 NOTE — PROGRESS NOTES
[facility]  Neurology  Progress Note    Patient Name: Subhash Ac  MRN: [id number]  Admission Date: 3/29/2019  Hospital Length of Stay: 6 days  Code Status: DNR   Attending Provider: Berlin Calderon MD  Primary Care Physician: Primary Doctor No   Principal Problem:ST elevation myocardial infarction (STEMI)      Subjective:     Interval History: Patient had an episode of Vtach yesterday with HR in the 200s. Synchronized with 200joules x3. Given amio, lidocaine, Mg sulphate and Potassium chloride. Converted to NSR after.    EEG remains slow with no brainstem reflexes apart from reactive pupils.     Current Neurological Medications:     Current Facility-Administered Medications   Medication Dose Route Frequency Provider Last Rate Last Dose    0.9%  NaCl infusion (CRRT USE ONLY)   Intravenous Continuous Gordo Coronado  mL/hr at 04/03/19 1902      0.9%  NaCl infusion (for blood administration)   Intravenous Q24H PRN Milli Mendosa MD        0.9%  NaCl infusion (for blood administration)   Intravenous Q24H PRN Juan Virk MD        0.9%  NaCl infusion (for blood administration)   Intravenous Q24H PRN Miles Coley MD        0.9%  NaCl infusion (for blood administration)   Intravenous Q24H PRN Jyoti Peck MD        0.9%  NaCl infusion (for blood administration)   Intravenous Q24H PRN Miles Coley MD        0.9%  NaCl infusion (for blood administration)   Intravenous Q24H PRN Miles Coley MD        acetaminophen tablet 650 mg  650 mg Per OG tube Q4H PRN Deana Marina MD        acetaminophen tablet 650 mg  650 mg Oral Q6H PRN Deana Marina MD        amiodarone 360 mg/200 mL (1.8 mg/mL) infusion  1 mg/min Intravenous Continuous Jyoti Peck MD 33.3 mL/hr at 04/04/19 1000 1 mg/min at 04/04/19 1000    aspirin chewable tablet 81 mg  81 mg Oral Daily Berlin Calderon MD   81 mg at 04/04/19 0900    bisacodyl suppository 10 mg  10 mg  Rectal Q12H PRN Deana Marina MD        chlorhexidine 0.12 % solution 15 mL  15 mL Mouth/Throat BID Miles oCley MD   15 mL at 04/04/19 0905    clopidogrel tablet 75 mg  75 mg Oral Daily Wesly Berg III, MD   75 mg at 04/04/19 0900    dextrose 5 % and 0.9 % NaCl with KCl 20 mEq infusion  5 mL/hr Intravenous Continuous Deana Marina MD 5 mL/hr at 03/29/19 2119 5 mL/hr at 03/29/19 2119    dextrose 50% injection 12.5 g  12.5 g Intravenous PRN Latha Little NP        EPINEPHrine (ADRENALIN) 10 mg in sodium chloride 0.9% 250 mL infusion  0.05 mcg/kg/min (Dosing Weight) Intravenous Continuous Deana Marina MD 7.8 mL/hr at 04/04/19 1000 0.05 mcg/kg/min at 04/04/19 1000    fluconazole (DIFLUCAN) IVPB 400 mg 200 mL  400 mg Intravenous Q24H Miles Montoya  mL/hr at 04/04/19 0140 400 mg at 04/04/19 0140    glucagon (human recombinant) injection 1 mg  1 mg Intramuscular PRN Latha Little NP        heparin 25,000 units in dextrose 5% 250 mL (100 units/mL) infusion (heparin infusion)  1,300 Units/hr Intravenous Continuous Miles Coley MD 15 mL/hr at 04/04/19 1000 1,500 Units/hr at 04/04/19 1000    hydrocortisone sodium succinate injection 100 mg  100 mg Intravenous Q8H Miles Montoya MD   100 mg at 04/04/19 0606    insulin aspart U-100 pen 0-5 Units  0-5 Units Subcutaneous Q6H PRN Latha Little NP        lidocaine 2000 mg in dextrose 5% 250 mL infusion  4 mg/min Intravenous Continuous Miles Coley MD   Stopped at 04/03/19 1110    magnesium sulfate 2g in water 50mL IVPB (premix)  2 g Intravenous PRN Gordo Coronado MD   2 g at 04/04/19 0745    niCARdipine 40 mg/200 mL infusion  1 mg/hr Intravenous Continuous Miles Coley MD   Stopped at 04/04/19 0600    ondansetron disintegrating tablet 8 mg  8 mg Oral Q8H PRN Deana Marina MD        ondansetron injection 4 mg  4 mg Intravenous Q12H PRN Deana Marina MD        oxyCODONE  immediate release tablet 5 mg  5 mg Oral Q4H PRN Deana Marina MD        oxyCODONE immediate release tablet Tab 10 mg  10 mg Oral Q4H PRN Deana Marina MD        pantoprazole injection 40 mg  40 mg Intravenous BID Momo London MD   40 mg at 04/04/19 0900    polyethylene glycol packet 17 g  17 g Oral Daily Deana Marina MD   17 g at 04/03/19 0940    rifAXIMin tablet 550 mg  550 mg Per G Tube BID Abdirizak Ha MD   550 mg at 04/04/19 0905    vancomycin in dextrose 5 % 1 gram/250 mL IVPB 1,000 mg  1,000 mg Intravenous Q24H Momo London MD   1,000 mg at 04/04/19 0900       Review of Systems   Unable to perform ROS: Intubated     Objective:     Vital Signs (Most Recent):  Temp: 98.7 °F (37.1 °C) (04/04/19 0700)  Pulse: 78 (04/04/19 1000)  Resp: (!) 43 (04/04/19 1000)  BP: (!) 80/68 (04/03/19 0610)  SpO2: 100 % (04/04/19 1000) Vital Signs (24h Range):  Temp:  [97.6 °F (36.4 °C)-98.7 °F (37.1 °C)] 98.7 °F (37.1 °C)  Pulse:  [] 78  Resp:  [27-46] 43  SpO2:  [96 %-100 %] 100 %  Arterial Line BP: (56-94)/(48-82) 82/71     Weight: 106 kg (233 lb 11 oz)  Body mass index is 34.51 kg/m².    Physical Exam   Cardiovascular: Tachycardia present.   Nursing note and vitals reviewed.      NEUROLOGICAL EXAMINATION:     MENTAL STATUS   Level of consciousness: unresponsive to painful stimuli       E1VtM1  Does not open eyes or withdraw to painful stimuli.  Pupils brisk.  No gag or corneal.       CRANIAL NERVES     CN III, IV, VI   Right pupil: Reactivity: brisk.   Left pupil: Reactivity: sluggish.     REFLEXES     Reflexes   Right plantar reflex: Silent.      Significant Labs:   CBC:   Recent Labs   Lab 04/03/19  2345  04/04/19  0330 04/04/19  0803 04/04/19  0804   WBC 17.34*  --  16.18* 13.45*  --    HGB 9.5*  --  9.4* 8.1*  --    HCT 28.7*   < > 29.0* 25.6* 31*   PLT 72*  --  59* 44*  --     < > = values in this interval not displayed.     CMP:   Recent Labs   Lab 04/03/19 2000  04/03/19  2200 04/03/19  2345 04/04/19  0330   GLU 95 100 95 104  104  104    136 137 136  136  136   K 4.0 4.0 3.9 4.0  4.0  4.0    103 103 105  105  105   CO2 25 23 23 21*  21*  21*   BUN 13 12 13 13  13  13   CREATININE 1.1 1.1 1.2 1.1  1.1  1.1   CALCIUM 10.7* 10.7* 10.7* 11.4*  11.4*  11.4*   MG 2.3 2.0 1.9 2.5  2.5   PROT 4.9*  --  5.0* 4.8*   ALBUMIN 3.1* 3.2* 3.2* 3.0*  3.0*  3.0*   BILITOT 15.6*  --  16.6* 16.4*   ALKPHOS 185*  --  194* 179*   *  --  802* 694*   *  --  739* 709*   ANIONGAP 8 10 11 10  10  10   EGFRNONAA >60.0 >60.0 >60.0 >60.0  >60.0  >60.0     Inflammatory Markers: No results for input(s): SEDRATE, CRP, PROCAL in the last 48 hours.  POCT Glucose:   Recent Labs   Lab 04/03/19  1811 04/03/19  2351 04/04/19  0605   POCTGLUCOSE 96 105 127*     All pertinent lab results from the past 24 hours have been reviewed.    Significant Imaging: I have reviewed all pertinent imaging results/findings within the past 24 hours.    Assessment and Plan:     * ST elevation myocardial infarction (STEMI)  -- S/p successful PCI  -- Complicated by cardiogenic shock. On ECMO.    Abnormal movements  43 yr old male with STEMI s/p PCI complicated by cardiogenic shock on ECMO along with amio, heparin gtts, previously on lido gtt. Neurology consulted for abnormal movements concerning for seizures in this critically ill patient.   EEG shows diffuse slowing with 1-2 Hz activity. No evidence of electrographic seizures.  Does not withdraw to noxious stimuli.  His pupils are brisk. No VO, corneal or gag reflexes.    Assessment and Recommendations:  -- His exam is NOT consistent with brain death, however neurologic prognosis is guarded. He has been off sedation for >48 hours with no change in his exam which is concerning and is a poor prognostic indicator. Had a long, honest conversation again today with his family and told them that he will never recover to what he was before  this admission. Furthermore the cardiac and metabolic issues that he is facing complicate the clinical picture. The degree of recovery is difficult to determine at this time, but if he does survive we suspect that he would be permanently disabled and required care. The family understands this and all questions and concerns were addressed.  -- Do not recommend repeat neuroimaging as it would not alter his management or prognosis.  -- Neuro checks q1h    Acute ischemic right PCA stroke  -- followed by Vascular neurology    Lower limb ischemia  -- s/p left AKA    ATN (acute tubular necrosis)  -- on CRRT    Cardiogenic shock  -- On ECMO  -- Attempted to pause 4/3 unsuccessfully         VTE Risk Mitigation (From admission, onward)        Ordered     heparin 25,000 units in dextrose 5% 250 mL (100 units/mL) infusion (heparin infusion)  Continuous      03/29/19 8716          Abraham Carter MD  Neurology  [facility]

## 2019-04-05 NOTE — ASSESSMENT & PLAN NOTE
43 yr old male with STEMI s/p PCI complicated by cardiogenic shock on ECMO along with amio, heparin gtts, previously on lido gtt. Neurology consulted for abnormal movements concerning for seizures in this critically ill patient.   EEG shows diffuse slowing with 1-2 Hz activity. No evidence of electrographic seizures.  Does not withdraw to noxious stimuli.  His pupils are brisk. No VO, corneal or gag reflexes.    Assessment and Recommendations:  -- His exam is NOT consistent with brain death, however neurologic prognosis is guarded. He has been off sedation for >48 hours with no change in his exam which is concerning and is a poor prognostic indicator. Had a long, honest conversation again today with his family and told them that he will never recover to what he was before this admission. Furthermore the cardiac and metabolic issues that he is facing complicate the clinical picture. The degree of recovery is difficult to determine at this time, but if he does survive we suspect that he would be permanently disabled and required care. The family understands this and all questions and concerns were addressed.  -- Do not recommend repeat neuroimaging as it would not alter his management or prognosis.  -- Neuro checks q1h

## 2019-04-08 NOTE — DISCHARGE SUMMARY
[facility]  General Surgery  Discharge Summary      Patient Name: Subhash Ac  MRN: [id number]  Admission Date: 3/29/2019  Hospital Length of Stay: 6 days  Discharge Date and Time: 4/10/2019     Attending Physician:Berlin Calderon   Discharging Provider: Miles Coley MD  Primary Care Provider: Primary Doctor No     HPI:   43 year old male admitted with STEMI and cardiogenic shock. He was recently  one week ago and history is provided by his wife. Within the past week the patient had been complaining of multiple pains in the chest, back and shoulders which would alternate in location. In recent days he had become less physically active and increasingly fatigued. On the evening of admission, the patient did not feel well and went to lay down. He woke up his wife at 10 pm, was diaphoretic, in a panic, and complaining of severe chest pain (similar to the pains he had experienced during the past week). The patient was brought to the ER and taken to the cath lab for STEMI. He quit smoking 2 weeks ago and his wife is uncertain about any family history of cardiac disease. The patient had not previously complained of dyspnea, orthopnea, PND, peripheral edema or syncope    Procedure(s) (LRB):  LEFT ABOVE KNEE AMPUTATION REMOVAL OF IMPELLA 5.0 (Left)  FASCIOTOMY 3 COMPARTMENT (Left)  REPAIR, ARTERY, FEMORAL  AMPUTATION, ABOVE KNEE     Hospital Course:   Admitted on 3/29/19 for STEMI and taken for [facility] w PCI to LM-LAD, IABP placement and immediate removal followed by placement of Impella CP.   He remained in refractory cardiogenic shock and was later placed on Tandem life external VAD that same day. That evening he continued to require more support than the Tandem machine could provide and he was started on VA ECMO 3/29.  On POD 1 he was noted to have an ischemic left lower extremity on the side of previous impella placement, he underwent L AKA the afternoon of 3/30 and returned to the ICU with mild  improvement in his MOSF. During this time he was requiring full flow ECMO, CRRT, and pressor support. Sedation was held  and he did not awaken or follow commands. A head CT was obtained which demonstrated a large ischemic stroke of the right occipitoparietal region. Stroke team was consulted and initially gave an optimistic prognosis with respect to neurologic function. He remained off sedation from - without improvement in neuro exams. Neurology was reconsulted on 4/3 and continuous EEG was obtained demonstrating a grim neurologic prognosis. On the morning of  ECMO was decreased and his native heart function had not improved, he had no ejection of his LV. All of this information was presented to the family members present, his wife and father, who believed the best course of action was withdrawal of care. Palliative was consulted to help guide this process and the decision was finalized. On the evening of  we initiated comfort measures and withdrew pressor support as well as ECMO. The patient  shortly after.     Consults:   Consults (From admission, onward)        Status Ordering Provider     Consult to Endocrinology  Once     Provider:  (Not yet assigned)    Completed AGNES MARTIN     Inpatient consult to Cardiothoracic Surgery  Once     Provider:  (Not yet assigned)    Completed JOSHUA KHAN     Inpatient consult to Electrophysiology  Once     Provider:  (Not yet assigned)    Completed JOSSIE KWONG     Inpatient consult to Heart Transplant  Once     Provider:  (Not yet assigned)    Completed AGNES MARTIN     Inpatient consult to Nephrology  Once     Provider:  (Not yet assigned)    Completed ABIGAIL RAMIREZ     Inpatient consult to Neurology  Once     Provider:  (Not yet assigned)    Completed DEJAH ANDERSON     Inpatient consult to Palliative Care  Once     Provider:  (Not yet assigned)    Completed DEJAH ANDERSON     Inpatient consult to Registered  Dietitian/Nutritionist  Once     Provider:  (Not yet assigned)    Completed AGNES MARTIN     Inpatient consult to Vascular (Stroke) Neurology  Once     Provider:  (Not yet assigned)    Completed DEJAH ANDERSON     Inpatient consult to Vascular Surgery  Once     Provider:  (Not yet assigned)    Completed GENOVEVA RICO          Significant Diagnostic Studies: See Chart    Pending Diagnostic Studies:     Procedure Component Value Units Date/Time    APTT [[id number]] Collected:  04/02/19 1054    Order Status:  Sent Lab Status:  In process Updated:  04/02/19 1055    Specimen:  Blood     CBC auto differential [[id number]] Collected:  03/31/19 1200    Order Status:  Sent Lab Status:  In process Updated:  03/31/19 1214    Specimen:  Blood     EKG 12-LEAD [[id number]]     Order Status:  Sent Lab Status:  No result     EKG 12-LEAD on arrival to floor [[id number]]     Order Status:  Sent Lab Status:  No result     EKG 12-lead [[id number]]     Order Status:  Sent Lab Status:  No result     Protime-INR [[id number]] Collected:  04/02/19 1054    Order Status:  Sent Lab Status:  In process Updated:  04/02/19 1055    Specimen:  Blood         Final Active Diagnoses:    Diagnosis Date Noted POA    PRINCIPAL PROBLEM:  ST elevation myocardial infarction (STEMI) [I21.3] 03/29/2019 Yes    Palliative care encounter [Z51.5] 04/04/2019 Not Applicable    Goals of care, counseling/discussion [Z71.89]  Not Applicable    Abnormal movements [R25.9] 04/03/2019 No    VT (ventricular tachycardia) [I47.2] 04/01/2019 No    Acute ischemic right PCA stroke [I63.531] 04/01/2019 Unknown    Multi-organ failure with heart failure [I50.9] 03/31/2019 Unknown    Acute hyperglycemia [R73.9] 03/30/2019 Unknown    Lower limb ischemia [I99.8]  Unknown    Chest pain [R07.9] 03/29/2019 Yes    Cardiogenic shock [R57.0] 03/29/2019 Yes    On mechanically assisted ventilation [Z99.11] 03/29/2019 Not Applicable    JACOBY (acute kidney injury) [N17.9]  2019 Yes    LFT elevation [R94.5] 2019 Unknown    Acute MI [I21.9] 2019 Yes    Metabolic acidosis [E87.2]  Unknown    ATN (acute tubular necrosis) [N17.0]  Yes      Problems Resolved During this Admission:      Discharged Condition:     Disposition:     Follow Up:    Patient Instructions:   No discharge procedures on file.  Medications:  None (patient  at medical facility)    Miles Coley MD  General Surgery  [facility]

## (undated) DEVICE — SUT SILK 2-0 SH 18IN BLACK

## (undated) DEVICE — CATH NC QUANTUM APEX MR 3.5X12

## (undated) DEVICE — OMNIPAQUE 350 200ML

## (undated) DEVICE — KIT CUSTOM MANIFOLD

## (undated) DEVICE — SEE MEDLINE ITEM 154981

## (undated) DEVICE — STAPLER SKIN PROXIMATE WIDE

## (undated) DEVICE — KIT PREVENA PLUS

## (undated) DEVICE — TRAY TRACH BLUE RHINO 8

## (undated) DEVICE — GUIDE LAUNCHER 6FR JL 4.0

## (undated) DEVICE — SUT BONE WAX 2.5 GRMS 12/BX

## (undated) DEVICE — KIT CATH INSERT LINEAR 7.5F

## (undated) DEVICE — KIT HEMODIALYSIS 3L 12F 20CM

## (undated) DEVICE — BLADE 4IN EDGE INSULATED

## (undated) DEVICE — SUT 2-0 12-18IN SILK

## (undated) DEVICE — SEE MEDLINE ITEM 152622

## (undated) DEVICE — KIT ADULT TOURNIQUET SNARE

## (undated) DEVICE — DILATOR COONS TAPER 14FR

## (undated) DEVICE — SHEATH INTRODUCER 8FR 11CM

## (undated) DEVICE — SUT MONOCRYL 2-0 CT-1 VIL

## (undated) DEVICE — SUT ETHIBOND XTRA 1 CTX

## (undated) DEVICE — SHEET EENT SPLIT

## (undated) DEVICE — WIRE LUNDERQUIST 260CM

## (undated) DEVICE — GAUZE FLUFF XXLG 36X36 2 PLY

## (undated) DEVICE — CATH DXTERITY JR40 100CM 6FR

## (undated) DEVICE — SUT PROLENE 5-0 36IN C-1

## (undated) DEVICE — COVER SURG TABLE BACK 44X76IN

## (undated) DEVICE — ELECTRODE PENCIL W/ROCKER NDL

## (undated) DEVICE — SPONGE LAP 18X18 PREWASHED

## (undated) DEVICE — DRAPE INCISE IOBAN 2 23X17IN

## (undated) DEVICE — TANDEMHEART BLOOD PUMP KIT WITH 29 FR VENO-VENOUS CANNULA
Type: IMPLANTABLE DEVICE | Site: OTHER (ADD COMMENT) | Status: NON-FUNCTIONAL
Brand: PROTEKDUO

## (undated) DEVICE — SEE MEDLINE ITEM 156894

## (undated) DEVICE — APPLICATOR CHLORAPREP ORN 26ML

## (undated) DEVICE — SEE MEDLINE ITEM 157187

## (undated) DEVICE — SUT VICRYL 2-0 36 CT-1

## (undated) DEVICE — SUT 3-0 12-18IN SILK

## (undated) DEVICE — KIT MICROINTRO 4F .018X40X7CM

## (undated) DEVICE — CATH TREK RX 2.50MM X 15MM

## (undated) DEVICE — CATH WEDGE 6FR X 110CM

## (undated) DEVICE — DRAPE PLASTIC U 60X72

## (undated) DEVICE — DEVICE PERCLOSE SUT CLSR 6FR

## (undated) DEVICE — CATH NC QUANTUM APEX MR 4X8MM

## (undated) DEVICE — TOURNIQUET SB QC DP 34X4IN

## (undated) DEVICE — BLADE HEAVY DUTY SAGITTAL

## (undated) DEVICE — APPLIER LIGACLIP MED 11IN

## (undated) DEVICE — COVER LIGHT HANDLE 80/CA

## (undated) DEVICE — STOPCOCK 3-WAY

## (undated) DEVICE — BLADE SAW STRNM 8.66X40.34X.6

## (undated) DEVICE — DIALATOR COONS TAPER 12F 20CM

## (undated) DEVICE — KIT PROBE COVER WITH GEL

## (undated) DEVICE — CABLE PACER

## (undated) DEVICE — ELECTRODE REM PLYHSV RETURN 9

## (undated) DEVICE — GUIDEWIRE EMERALD 150CM PTFE

## (undated) DEVICE — SEE MEDLINE ITEM 146345

## (undated) DEVICE — SUT 2/0 36IN COATED VICRYL

## (undated) DEVICE — CATH IMPELLA CP 14F 4.7X65MM

## (undated) DEVICE — SUT SILK SH 2-0 30IN MP BLK

## (undated) DEVICE — BLADE STERN 65.8MM

## (undated) DEVICE — GUIDEWIRE CHOICE PT  XS 182CM

## (undated) DEVICE — SHEATH INTRODUCER 5FR 10CM

## (undated) DEVICE — CATH 5FR BALLOON PT HEART PACE

## (undated) DEVICE — GUIDE WIRE BMW 014 X190

## (undated) DEVICE — GUIDEWIRE SUPRA CORE 035 190CM

## (undated) DEVICE — CATH AL1 4FR

## (undated) DEVICE — BANDAGE ACE ELASTIC 6"

## (undated) DEVICE — SPIKE CONTRAST CONTROLLER

## (undated) DEVICE — STOCKINET TUBULAR 1 PLY 6X60IN

## (undated) DEVICE — SEE MEDLINE ITEM 146298

## (undated) DEVICE — SEE MEDLINE ITEM 152523

## (undated) DEVICE — DILATOR VES COONS .038X16X20CM

## (undated) DEVICE — GAUZE SPONGE 4X4 12PLY

## (undated) DEVICE — 31 FR X 51 CM VENO-VENOUS DUAL LUMEN CANNULA WITH INTRODUCER
Type: IMPLANTABLE DEVICE | Site: NECK | Status: NON-FUNCTIONAL
Brand: PROTEKDUO
Removed: 2019-03-29

## (undated) DEVICE — DILATOR VESSEL COONS 8FR 20CM

## (undated) DEVICE — SPONGE DERMACEA GAUZE 4X4

## (undated) DEVICE — CATH DXTERITY JL40 100CM 6FR

## (undated) DEVICE — SHEATH INTRODUCER 6FR 11CM

## (undated) DEVICE — TRAY MINOR GEN SURG